# Patient Record
Sex: FEMALE | Race: WHITE | NOT HISPANIC OR LATINO | Employment: OTHER | URBAN - METROPOLITAN AREA
[De-identification: names, ages, dates, MRNs, and addresses within clinical notes are randomized per-mention and may not be internally consistent; named-entity substitution may affect disease eponyms.]

---

## 2019-02-14 ENCOUNTER — HOSPITAL ENCOUNTER (OUTPATIENT)
Dept: RADIOLOGY | Facility: HOSPITAL | Age: 39
Discharge: HOME | End: 2019-02-14
Attending: ORTHOPAEDIC SURGERY
Payer: MEDICARE

## 2019-02-14 ENCOUNTER — OFFICE VISIT (OUTPATIENT)
Dept: ORTHOPEDICS | Facility: CLINIC | Age: 39
End: 2019-02-14
Payer: MEDICARE

## 2019-02-14 VITALS
BODY MASS INDEX: 39.14 KG/M2 | SYSTOLIC BLOOD PRESSURE: 128 MMHG | HEART RATE: 77 BPM | DIASTOLIC BLOOD PRESSURE: 84 MMHG | WEIGHT: 228 LBS

## 2019-02-14 DIAGNOSIS — M79.672 LEFT FOOT PAIN: ICD-10-CM

## 2019-02-14 DIAGNOSIS — M79.672 LEFT FOOT PAIN: Primary | ICD-10-CM

## 2019-02-14 DIAGNOSIS — S92.345A NONDISPLACED FRACTURE OF FOURTH METATARSAL BONE, LEFT FOOT, INITIAL ENCOUNTER FOR CLOSED FRACTURE: ICD-10-CM

## 2019-02-14 DIAGNOSIS — E55.9 VITAMIN D DEFICIENCY: Primary | ICD-10-CM

## 2019-02-14 DIAGNOSIS — S92.335A NONDISPLACED FRACTURE OF THIRD METATARSAL BONE, LEFT FOOT, INITIAL ENCOUNTER FOR CLOSED FRACTURE: ICD-10-CM

## 2019-02-14 PROCEDURE — 28470 CLTX METATARSAL FX WO MNP EA: CPT | Mod: PBBFAC,PN | Performed by: ORTHOPAEDIC SURGERY

## 2019-02-14 PROCEDURE — 73630 X-RAY EXAM OF FOOT: CPT | Mod: TC,PO,LT

## 2019-02-14 PROCEDURE — 28470 PR CLOSED RX METATARSAL FX: ICD-10-PCS | Mod: S$PBB,LT,, | Performed by: ORTHOPAEDIC SURGERY

## 2019-02-14 PROCEDURE — 99999 PR PBB SHADOW E&M-NEW PATIENT-LVL III: CPT | Mod: PBBFAC,,, | Performed by: ORTHOPAEDIC SURGERY

## 2019-02-14 PROCEDURE — 99203 OFFICE O/P NEW LOW 30 MIN: CPT | Mod: S$PBB,57,, | Performed by: ORTHOPAEDIC SURGERY

## 2019-02-14 PROCEDURE — 73630 X-RAY EXAM OF FOOT: CPT | Mod: 26,LT,, | Performed by: RADIOLOGY

## 2019-02-14 PROCEDURE — 28470 CLTX METATARSAL FX WO MNP EA: CPT | Mod: S$PBB,LT,, | Performed by: ORTHOPAEDIC SURGERY

## 2019-02-14 PROCEDURE — 99999 PR PBB SHADOW E&M-NEW PATIENT-LVL III: ICD-10-PCS | Mod: PBBFAC,,, | Performed by: ORTHOPAEDIC SURGERY

## 2019-02-14 PROCEDURE — 73630 XR FOOT COMPLETE 3 VIEW LEFT: ICD-10-PCS | Mod: 26,LT,, | Performed by: RADIOLOGY

## 2019-02-14 PROCEDURE — 99203 OFFICE O/P NEW LOW 30 MIN: CPT | Mod: PBBFAC,25,PN | Performed by: ORTHOPAEDIC SURGERY

## 2019-02-14 PROCEDURE — 99203 PR OFFICE/OUTPT VISIT, NEW, LEVL III, 30-44 MIN: ICD-10-PCS | Mod: S$PBB,57,, | Performed by: ORTHOPAEDIC SURGERY

## 2019-02-18 RX ORDER — ERGOCALCIFEROL 1.25 MG/1
50000 CAPSULE ORAL
Qty: 4 CAPSULE | Refills: 0 | Status: SHIPPED | OUTPATIENT
Start: 2019-02-18 | End: 2019-09-27 | Stop reason: ALTCHOICE

## 2019-02-19 PROBLEM — S92.335A NONDISPLACED FRACTURE OF THIRD METATARSAL BONE, LEFT FOOT, INITIAL ENCOUNTER FOR CLOSED FRACTURE: Status: ACTIVE | Noted: 2019-02-19

## 2019-02-19 PROBLEM — S92.345A NONDISPLACED FRACTURE OF FOURTH METATARSAL BONE, LEFT FOOT, INITIAL ENCOUNTER FOR CLOSED FRACTURE: Status: ACTIVE | Noted: 2019-02-19

## 2019-02-19 NOTE — PROGRESS NOTES
"HPI: Sergio Clifford is a  38 y.o. female who complains of left foot pain. She had lisfranc fusion with me due to trauma about 4 years ago in this foot. She says she tripped and twisted her foot last night. She rates her pain as 5/10 today.     PAST MEDICAL/SURGICAL/FAMILY/SOCIAL/ HISTORY: REVIEWED    ALLERGIES/MEDICATIONS: REVIEWED       Review of Systems:     Constitution: Negative.   HEENT: Negative.   Eyes: Negative.   Cardiovascular: Negative.   Respiratory: Negative.   Endocrine: Negative.   Hematologic/Lymphatic: Negative.   Skin: Negative.   Musculoskeletal: Positive for left foot pain   Gastrointestinal: Negative.   Genitourinary: Negative.   Neurological: Negative.   Psychiatric/Behavioral: Negative.   Allergic/Immunologic: Negative.       PHYSICAL EXAM:  Vitals:    02/14/19 1115   BP: 128/84   Pulse: 77     Ht Readings from Last 1 Encounters:   11/06/14 5' 4" (1.626 m)     Wt Readings from Last 1 Encounters:   02/14/19 103.4 kg (228 lb)       GENERAL: Well developed, well nourished, no acute distress.  SKIN: Skin is intact. No atrophy, abrasions or lesions are noted.   Neurological: Normal mental status. Appropriate and conversant. Alert and oriented x 3.  GAIT: In wheelchair    Left  lower extremity:  2+ dorsalis pedis pulse.  Capillary refill < 3 seconds.  Normal range of motion tibiotalar and subtalar joints. Normal alignment of the forefoot and the hindfoot.  5/5 strength EHL, FHL, tibialis anterior, gastrocsoleus, tibialis posterior and peroneals. Sensation to light touch intact sural, saphenous, superficial peroneal and deep peroneal nerves. Mild swelling and ecchymosis of the forefoot, no deformity. No lymphadenopathy, no masses or tumors palpated.      XRAYS:   3 views of left foot obtained and reviewed today reveal minimally displaced 3rd and 4th metatarsal neck fractures with mild shortening.       ASSESSMENT:        Encounter Diagnoses   Name Primary?    Nondisplaced fracture of fourth " metatarsal bone, left foot, initial encounter for closed fracture     Nondisplaced fracture of third metatarsal bone, left foot, initial encounter for closed fracture           PLAN:  I spent 15 minutes in consulation with the patient today. More than half the time was spent counseling the patient on her condition. Non-operative treatment. Weight bearing as tolerated in jacque shoe. F/u 3 weeks with xray of the left foot.   I also ordered a vitamin D level and will supplement if needed.

## 2019-03-04 DIAGNOSIS — S92.335A NONDISPLACED FRACTURE OF THIRD METATARSAL BONE, LEFT FOOT, INITIAL ENCOUNTER FOR CLOSED FRACTURE: ICD-10-CM

## 2019-03-04 DIAGNOSIS — S92.345A NONDISPLACED FRACTURE OF FOURTH METATARSAL BONE, LEFT FOOT, INITIAL ENCOUNTER FOR CLOSED FRACTURE: Primary | ICD-10-CM

## 2019-03-07 ENCOUNTER — HOSPITAL ENCOUNTER (OUTPATIENT)
Dept: RADIOLOGY | Facility: HOSPITAL | Age: 39
Discharge: HOME | End: 2019-03-07
Attending: ORTHOPAEDIC SURGERY
Payer: MEDICARE

## 2019-03-07 ENCOUNTER — OFFICE VISIT (OUTPATIENT)
Dept: ORTHOPEDICS | Facility: CLINIC | Age: 39
End: 2019-03-07
Payer: MEDICARE

## 2019-03-07 VITALS
SYSTOLIC BLOOD PRESSURE: 124 MMHG | HEART RATE: 90 BPM | DIASTOLIC BLOOD PRESSURE: 85 MMHG | WEIGHT: 227.94 LBS | BODY MASS INDEX: 38.91 KG/M2 | HEIGHT: 64 IN

## 2019-03-07 DIAGNOSIS — S92.335A NONDISPLACED FRACTURE OF THIRD METATARSAL BONE, LEFT FOOT, INITIAL ENCOUNTER FOR CLOSED FRACTURE: ICD-10-CM

## 2019-03-07 DIAGNOSIS — S92.345A NONDISPLACED FRACTURE OF FOURTH METATARSAL BONE, LEFT FOOT, INITIAL ENCOUNTER FOR CLOSED FRACTURE: Primary | ICD-10-CM

## 2019-03-07 DIAGNOSIS — S92.345A NONDISPLACED FRACTURE OF FOURTH METATARSAL BONE, LEFT FOOT, INITIAL ENCOUNTER FOR CLOSED FRACTURE: ICD-10-CM

## 2019-03-07 PROCEDURE — 73630 X-RAY EXAM OF FOOT: CPT | Mod: 26,LT,, | Performed by: RADIOLOGY

## 2019-03-07 PROCEDURE — 99999 PR PBB SHADOW E&M-EST. PATIENT-LVL III: ICD-10-PCS | Mod: PBBFAC,,, | Performed by: ORTHOPAEDIC SURGERY

## 2019-03-07 PROCEDURE — 73630 X-RAY EXAM OF FOOT: CPT | Mod: TC,PO,LT

## 2019-03-07 PROCEDURE — 99024 POSTOP FOLLOW-UP VISIT: CPT | Mod: POP,,, | Performed by: ORTHOPAEDIC SURGERY

## 2019-03-07 PROCEDURE — 99999 PR PBB SHADOW E&M-EST. PATIENT-LVL III: CPT | Mod: PBBFAC,,, | Performed by: ORTHOPAEDIC SURGERY

## 2019-03-07 PROCEDURE — 73630 XR FOOT COMPLETE 3 VIEW LEFT: ICD-10-PCS | Mod: 26,LT,, | Performed by: RADIOLOGY

## 2019-03-07 PROCEDURE — 99024 PR POST-OP FOLLOW-UP VISIT: ICD-10-PCS | Mod: POP,,, | Performed by: ORTHOPAEDIC SURGERY

## 2019-03-07 PROCEDURE — 99213 OFFICE O/P EST LOW 20 MIN: CPT | Mod: PBBFAC,25,PN | Performed by: ORTHOPAEDIC SURGERY

## 2019-03-11 NOTE — PROGRESS NOTES
Subjective:      Patient ID: Sergio Clifford is a 38 y.o. female.    Chief Complaint: Injury of the Left Foot    Doing well today. She rates her pain as 4/10 today. DOI: 19.   Social History     Occupational History    Not on file   Tobacco Use    Smoking status: Former Smoker     Packs/day: 0.25     Years: 4.00     Pack years: 1.00     Types: Cigarettes     Last attempt to quit: 2012     Years since quittin.8    Smokeless tobacco: Never Used   Substance and Sexual Activity    Alcohol use: Yes     Comment: socal    Drug use: No    Sexual activity: Not on file            Objective:    Ortho Exam   Neurovascularly intact, mild swelling,  tenderness to palpation at the fracture sites.      XRAYS: 3 views of left foot obtained and reviewed today reveal interval progression of healing of 4th and 5th metatarsal neck frxs.       Assessment:       1. Nondisplaced fracture of fourth metatarsal bone, left foot, initial encounter for closed fracture    2. Nondisplaced fracture of third metatarsal bone, left foot, initial encounter for closed fracture          Plan:       Weight bearing as tolerated in jacque shoe. F/u 3 weeks with xray of the left foot.

## 2019-03-27 DIAGNOSIS — S92.345A NONDISPLACED FRACTURE OF FOURTH METATARSAL BONE, LEFT FOOT, INITIAL ENCOUNTER FOR CLOSED FRACTURE: Primary | ICD-10-CM

## 2019-03-27 DIAGNOSIS — S92.335A NONDISPLACED FRACTURE OF THIRD METATARSAL BONE, LEFT FOOT, INITIAL ENCOUNTER FOR CLOSED FRACTURE: ICD-10-CM

## 2019-03-28 ENCOUNTER — HOSPITAL ENCOUNTER (OUTPATIENT)
Dept: RADIOLOGY | Facility: HOSPITAL | Age: 39
Discharge: HOME | End: 2019-03-28
Attending: ORTHOPAEDIC SURGERY
Payer: MEDICARE

## 2019-03-28 ENCOUNTER — OFFICE VISIT (OUTPATIENT)
Dept: ORTHOPEDICS | Facility: CLINIC | Age: 39
End: 2019-03-28
Payer: MEDICARE

## 2019-03-28 VITALS
SYSTOLIC BLOOD PRESSURE: 127 MMHG | BODY MASS INDEX: 38.76 KG/M2 | HEIGHT: 64 IN | HEART RATE: 81 BPM | WEIGHT: 227 LBS | DIASTOLIC BLOOD PRESSURE: 89 MMHG

## 2019-03-28 DIAGNOSIS — S92.335A NONDISPLACED FRACTURE OF THIRD METATARSAL BONE, LEFT FOOT, INITIAL ENCOUNTER FOR CLOSED FRACTURE: ICD-10-CM

## 2019-03-28 DIAGNOSIS — S92.345A NONDISPLACED FRACTURE OF FOURTH METATARSAL BONE, LEFT FOOT, INITIAL ENCOUNTER FOR CLOSED FRACTURE: Primary | ICD-10-CM

## 2019-03-28 DIAGNOSIS — S92.345A NONDISPLACED FRACTURE OF FOURTH METATARSAL BONE, LEFT FOOT, INITIAL ENCOUNTER FOR CLOSED FRACTURE: ICD-10-CM

## 2019-03-28 PROCEDURE — 99213 OFFICE O/P EST LOW 20 MIN: CPT | Mod: PBBFAC,25,PN | Performed by: ORTHOPAEDIC SURGERY

## 2019-03-28 PROCEDURE — 73630 XR FOOT COMPLETE 3 VIEW LEFT: ICD-10-PCS | Mod: 26,LT,, | Performed by: RADIOLOGY

## 2019-03-28 PROCEDURE — 99999 PR PBB SHADOW E&M-EST. PATIENT-LVL III: CPT | Mod: PBBFAC,,, | Performed by: ORTHOPAEDIC SURGERY

## 2019-03-28 PROCEDURE — 73630 X-RAY EXAM OF FOOT: CPT | Mod: 26,LT,, | Performed by: RADIOLOGY

## 2019-03-28 PROCEDURE — 73630 X-RAY EXAM OF FOOT: CPT | Mod: TC,PO,LT

## 2019-03-28 PROCEDURE — 99024 PR POST-OP FOLLOW-UP VISIT: ICD-10-PCS | Mod: S$PBB,,, | Performed by: ORTHOPAEDIC SURGERY

## 2019-03-28 PROCEDURE — 99024 POSTOP FOLLOW-UP VISIT: CPT | Mod: S$PBB,,, | Performed by: ORTHOPAEDIC SURGERY

## 2019-03-28 PROCEDURE — 99999 PR PBB SHADOW E&M-EST. PATIENT-LVL III: ICD-10-PCS | Mod: PBBFAC,,, | Performed by: ORTHOPAEDIC SURGERY

## 2019-03-28 NOTE — PROGRESS NOTES
Subjective:      Patient ID: Sergio Clifford is a 38 y.o. female.    Chief Complaint: Injury of the Left Foot    Doing well today. She rates her pain as 0/10 today. DOI: 19.   Social History     Occupational History    Not on file   Tobacco Use    Smoking status: Former Smoker     Packs/day: 0.25     Years: 4.00     Pack years: 1.00     Types: Cigarettes     Last attempt to quit: 2012     Years since quittin.8    Smokeless tobacco: Never Used   Substance and Sexual Activity    Alcohol use: Yes     Comment: socal    Drug use: No    Sexual activity: Not on file            Objective:    Ortho Exam   Neurovascularly intact, mild swelling, minimal tenderness to palpation at the fracture sites.      XRAYS: 3 views of left foot obtained and reviewed today reveal interval progression of healing of 4th and 5th metatarsal neck frxs.       Assessment:       1. Nondisplaced fracture of fourth metatarsal bone, left foot, initial encounter for closed fracture    2. Nondisplaced fracture of third metatarsal bone, left foot, initial encounter for closed fracture          Plan:       Weight bearing as tolerated in regular shoe. Gradual return to exercise. F/u prn.

## 2019-09-20 ENCOUNTER — OFFICE VISIT (OUTPATIENT)
Dept: HEMATOLOGY/ONCOLOGY | Facility: CLINIC | Age: 39
End: 2019-09-20
Payer: MEDICARE

## 2019-09-20 VITALS
HEIGHT: 63 IN | TEMPERATURE: 99 F | DIASTOLIC BLOOD PRESSURE: 89 MMHG | RESPIRATION RATE: 19 BRPM | SYSTOLIC BLOOD PRESSURE: 125 MMHG | BODY MASS INDEX: 44.83 KG/M2 | HEART RATE: 87 BPM | WEIGHT: 253 LBS

## 2019-09-20 DIAGNOSIS — C50.512 MALIGNANT NEOPLASM OF LOWER-OUTER QUADRANT OF LEFT BREAST OF FEMALE, ESTROGEN RECEPTOR NEGATIVE: Primary | ICD-10-CM

## 2019-09-20 DIAGNOSIS — Z86.79 HISTORY OF MITRAL VALVE PROLAPSE: ICD-10-CM

## 2019-09-20 DIAGNOSIS — Z17.1 MALIGNANT NEOPLASM OF LOWER-OUTER QUADRANT OF LEFT BREAST OF FEMALE, ESTROGEN RECEPTOR NEGATIVE: Primary | ICD-10-CM

## 2019-09-20 PROCEDURE — 99204 PR OFFICE/OUTPT VISIT, NEW, LEVL IV, 45-59 MIN: ICD-10-PCS | Mod: S$GLB,,, | Performed by: INTERNAL MEDICINE

## 2019-09-20 PROCEDURE — 99204 OFFICE O/P NEW MOD 45 MIN: CPT | Mod: S$GLB,,, | Performed by: INTERNAL MEDICINE

## 2019-09-21 NOTE — PROGRESS NOTES
[facility] History & Physical    Subjective:      Patient ID:   Sergio Clifford  38 y.o. female  1980  ELROY Roche MD Vesanth Bethala, MD Cynthia Jean Pierre, MD      Chief Complaint:   L breast cancer.    HPI:  38 y.o. female palpated a L breast lump at 4 o'clock.  The mass is painful.  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  Mamm 2.8 cm mass.    Hx GERD, anxiety, MVP.     L breast lump bx at [facility], benign.   craniotomy for AVM removal,  shunt placed.  W.  Last MRI, ,  She is claustrophobic.   L foot fx and repair.    M0  Menses onset 12, 1st child at 13.    Light smoker, none since .  ETOH socially.  Allergy PCN, rash.  Codeine, itch. Demeral, hallucinations.  Job in Sales.    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  Brother lymphangioma or mesenteric cyst.  Sister multiple sclerosis.  Sister HTN, obese, LBP.    ROS:   GEN: normal without any fever, night sweats or weight loss  HEENT: See HPI  CV: normal with no CP, SOB, PND, GROVER or orthopnea  PULM: normal with no SOB, cough, hemoptysis, sputum or pleuritic pain  GI: normal with no abdominal pain, nausea, vomiting, constipation, diarrhea, melanotic stools, BRBPR, or hematemesis  : normal with no hematuria, dysuria  BREAST: See HPI  SKIN: normal with no rash, erythema, bruising, or swelling     Past Medical History:   Diagnosis Date    AVM (arteriovenous malformation)     CVA (cerebral infarction)     Edema     Fractures     MVP (mitral valve prolapse)     Reflux     Wears glasses      Past Surgical History:   Procedure Laterality Date    BRAIN AVM REPAIR      BREAST BIOPSY  left        CRANIOTOMY  2006    CVA tumor removal  2006    FOOT FRACTURE SURGERY      FUSION, FOOT Left 10/17/2012    Performed by Adryan Colorado MD at [facility] OR    VENTRICULOPERITONEAL SHUNT      MAGNETIC - NO MRI       Review of patient's allergies indicates:  "  Allergen Reactions    Codeine Itching    Demerol [meperidine] Other (See Comments)     halluciantions    Pcn [penicillins] Hives           Current Outpatient Medications:     betaxolol (KERLONE) 10 mg Tab, Take 5 mg by mouth once daily., Disp: , Rfl:     bumetanide (BUMEX) 2 MG tablet, Take 2 mg by mouth once daily. , Disp: , Rfl:     ENDOCET  mg per tablet, , Disp: , Rfl:     ergocalciferol (ERGOCALCIFEROL) 50,000 unit Cap, Take 1 capsule (50,000 Units total) by mouth every 7 days., Disp: 4 capsule, Rfl: 0    esomeprazole (NEXIUM) 20 MG capsule, Take 40 mg by mouth before breakfast. , Disp: , Rfl:     hydrochlorothiazide (HYDRODIURIL) 12.5 MG Tab, Take 12.5 mg by mouth once daily., Disp: , Rfl:     hydrocodone-acetaminophen (VICODIN) 5-500 mg per tablet, , Disp: , Rfl:     medroxyPROGESTERone (DEPO-PROVERA) 150 mg/mL injection, Inject 150 mg into the muscle every 3 (three) months. , Disp: , Rfl:     NASONEX 50 mcg/actuation nasal spray, , Disp: , Rfl:     sertraline (ZOLOFT) 25 MG tablet, Take 25 mg by mouth once daily. , Disp: , Rfl:     SIMVASTATIN ORAL, Take by mouth., Disp: , Rfl:     VOLTAREN 1 % Gel, continuous prn. , Disp: , Rfl:           Objective:   Vitals:  Blood pressure 125/89, pulse 87, temperature 99.1 °F (37.3 °C), temperature source Oral, resp. rate 19, height 5' 3" (1.6 m), weight 114.8 kg (253 lb).    Physical Examination:   GEN: no apparent distress, comfortable  HEAD: atraumatic and normocephalic  EYES: no pallor, no icterus  ENT:  no pharyngeal erythema, external ears WNL; no nasal discharge  NECK: no masses, thyroid normal, trachea midline, no LAD/LN's, supple  CV: RRR with no murmur; normal pulse; normal S1 and S2; no pedal edema  CHEST: Normal respiratory effort; CTAB; normal breath sounds; no wheeze or crackles  ABDOM: nontender and nondistended; soft;  no rebound/guarding, L/S NP  MUSC/Skeletal: ROM normal; no crepitus; joints normal; no deformities   EXTREM: no " clubbing, cyanosis, inflammation or swelling  SKIN: no rashes, lesions, ulcers, petechiae   : no cvat  NEURO: grossly intact; motor/sensory WNL;  no tremors,  Balance is off, ambulates with walker since her craniotomy.  PSYCH: normal mood, affect and behavior  LYMPH: normal cervical, supraclavicular, axillary and groin LN's  L Breast has hard nodule at 4 o'clock L breast, tender.  R Breast no palpable mass      Labs:   Lab Results   Component Value Date    WBC 8.4 10/16/2012    HGB 14.5 10/16/2012    HCT 42.3 10/16/2012    MCV 93.5 10/16/2012     (H) 10/16/2012        Assessment:   (1) 38 y.o. female with diagnosis of L lower outer Breast cancer, 2.8 cm, grade 3, triple negative.  Stage 2 dx clinically by primary size.    (2)Discussed the rationale of neoadjuvant chemotherapy x's 4-6 cycles, before surgery to decrease mass size, try to achieve CRISTIAN with chemo, before surgery.    She is leaning to bilateral mastectomy, and reconstruction, she does not want Rad Rx. And does not lean towards conserving the L breast.  She wants breast reduction.    Port placement Dr. Elkins.  2 D Echo for ejection fraction.    Options AC, TAC, CTX-Taxotere.    Check BRCA 1 & 2.    RTC 9/24/19.  Start week of 9/30/19, after port is placed.    Pt navigator, chemo school, co pay assistance stalin.          1. Malignant neoplasm of lower-outer quadrant of left breast of female, estrogen receptor negative    2. History of mitral valve prolapse               Malignant neoplasm of lower-outer quadrant of left breast of female, estrogen receptor negative  -     Ambulatory referral to General Surgery    History of mitral valve prolapse  -     Echo Color Flow Doppler? Yes; Bubble Contrast? No; Future; Expected date: 09/23/2019      Follow up in about 4 days (around 9/24/2019) for follow up on cancer status.    I have explained and the patient understands all of  the current recommendation(s). I have answered all of their questions to the best  of my ability and to their complete satisfaction.

## 2019-09-23 ENCOUNTER — CLINICAL SUPPORT (OUTPATIENT)
Dept: CARDIOLOGY | Facility: HOSPITAL | Age: 39
End: 2019-09-23
Attending: INTERNAL MEDICINE
Payer: MEDICARE

## 2019-09-23 VITALS — BODY MASS INDEX: 44.83 KG/M2 | WEIGHT: 253 LBS | HEIGHT: 63 IN

## 2019-09-23 LAB
AORTIC ROOT ANNULUS: 2.45 CM
AORTIC VALVE CUSP SEPERATION: 1.46 CM
AV INDEX (PROSTH): 0.97
AV MEAN GRADIENT: 3 MMHG
AV PEAK GRADIENT: 5 MMHG
AV VALVE AREA: 3.08 CM2
AV VELOCITY RATIO: 95.19
BSA FOR ECHO PROCEDURE: 2.26 M2
CV ECHO LV RWT: 0.59 CM
DOP CALC AO PEAK VEL: 1.13 M/S
DOP CALC AO VTI: 22.42 CM
DOP CALC LVOT AREA: 3.2 CM2
DOP CALC LVOT DIAMETER: 2.01 CM
DOP CALC LVOT PEAK VEL: 107.56 M/S
DOP CALC LVOT STROKE VOLUME: 69.01 CM3
DOP CALCLVOT PEAK VEL VTI: 21.76 CM
E WAVE DECELERATION TIME: 249.38 MSEC
E/A RATIO: 1.44
E/E' RATIO: 8.91 M/S
ECHO LV POSTERIOR WALL: 0.9 CM (ref 0.6–1.1)
FRACTIONAL SHORTENING: 30 % (ref 28–44)
INTERVENTRICULAR SEPTUM: 0.92 CM (ref 0.6–1.1)
LEFT ATRIUM SIZE: 3.73 CM
LEFT INTERNAL DIMENSION IN SYSTOLE: 2.12 CM (ref 2.1–4)
LEFT VENTRICLE MASS INDEX: 34 G/M2
LEFT VENTRICULAR INTERNAL DIMENSION IN DIASTOLE: 3.05 CM (ref 3.5–6)
LEFT VENTRICULAR MASS: 73.09 G
LV LATERAL E/E' RATIO: 7.54 M/S
LV SEPTAL E/E' RATIO: 10.89 M/S
MV PEAK A VEL: 0.68 M/S
MV PEAK E VEL: 0.98 M/S
PISA TR MAX VEL: 1.89 M/S
PV PEAK VELOCITY: 101.04 CM/S
RA PRESSURE: 3 MMHG
TDI LATERAL: 0.13 M/S
TDI SEPTAL: 0.09 M/S
TDI: 0.11 M/S
TR MAX PG: 14 MMHG
TV REST PULMONARY ARTERY PRESSURE: 17 MMHG

## 2019-09-23 PROCEDURE — 93306 TTE W/DOPPLER COMPLETE: CPT

## 2019-09-24 ENCOUNTER — OFFICE VISIT (OUTPATIENT)
Dept: HEMATOLOGY/ONCOLOGY | Facility: CLINIC | Age: 39
End: 2019-09-24
Payer: MEDICARE

## 2019-09-24 VITALS
RESPIRATION RATE: 18 BRPM | TEMPERATURE: 99 F | SYSTOLIC BLOOD PRESSURE: 115 MMHG | WEIGHT: 253.69 LBS | DIASTOLIC BLOOD PRESSURE: 74 MMHG | HEART RATE: 75 BPM | BODY MASS INDEX: 44.95 KG/M2

## 2019-09-24 DIAGNOSIS — Z86.79 HISTORY OF MITRAL VALVE PROLAPSE: ICD-10-CM

## 2019-09-24 DIAGNOSIS — D70.1 CHEMOTHERAPY INDUCED NEUTROPENIA: ICD-10-CM

## 2019-09-24 DIAGNOSIS — S92.345A NONDISPLACED FRACTURE OF FOURTH METATARSAL BONE, LEFT FOOT, INITIAL ENCOUNTER FOR CLOSED FRACTURE: ICD-10-CM

## 2019-09-24 DIAGNOSIS — C50.512 MALIGNANT NEOPLASM OF LOWER-OUTER QUADRANT OF LEFT BREAST OF FEMALE, ESTROGEN RECEPTOR NEGATIVE: Primary | ICD-10-CM

## 2019-09-24 DIAGNOSIS — S92.335A NONDISPLACED FRACTURE OF THIRD METATARSAL BONE, LEFT FOOT, INITIAL ENCOUNTER FOR CLOSED FRACTURE: ICD-10-CM

## 2019-09-24 DIAGNOSIS — Z17.1 MALIGNANT NEOPLASM OF LOWER-OUTER QUADRANT OF LEFT BREAST OF FEMALE, ESTROGEN RECEPTOR NEGATIVE: Primary | ICD-10-CM

## 2019-09-24 DIAGNOSIS — T45.1X5A CHEMOTHERAPY INDUCED NEUTROPENIA: ICD-10-CM

## 2019-09-24 PROCEDURE — 99214 OFFICE O/P EST MOD 30 MIN: CPT | Mod: S$GLB,,, | Performed by: INTERNAL MEDICINE

## 2019-09-24 PROCEDURE — 99214 PR OFFICE/OUTPT VISIT, EST, LEVL IV, 30-39 MIN: ICD-10-PCS | Mod: S$GLB,,, | Performed by: INTERNAL MEDICINE

## 2019-09-24 RX ORDER — LEVOCETIRIZINE DIHYDROCHLORIDE 5 MG/1
5 TABLET, FILM COATED ORAL DAILY
COMMUNITY
Start: 2019-07-31 | End: 2021-11-02

## 2019-09-24 RX ORDER — SERTRALINE HYDROCHLORIDE 50 MG/1
50 TABLET, FILM COATED ORAL EVERY MORNING
Refills: 3 | COMMUNITY
Start: 2019-07-19 | End: 2021-03-17

## 2019-09-24 RX ORDER — NITROFURANTOIN 25; 75 MG/1; MG/1
CAPSULE ORAL
COMMUNITY
Start: 2019-09-23 | End: 2019-12-12 | Stop reason: ALTCHOICE

## 2019-09-27 ENCOUNTER — HOSPITAL ENCOUNTER (OUTPATIENT)
Dept: PREADMISSION TESTING | Facility: HOSPITAL | Age: 39
Discharge: HOME | End: 2019-09-27
Attending: INTERNAL MEDICINE
Payer: MEDICARE

## 2019-09-27 VITALS
TEMPERATURE: 98 F | WEIGHT: 255.75 LBS | DIASTOLIC BLOOD PRESSURE: 80 MMHG | SYSTOLIC BLOOD PRESSURE: 130 MMHG | RESPIRATION RATE: 18 BRPM | BODY MASS INDEX: 43.66 KG/M2 | OXYGEN SATURATION: 100 % | HEART RATE: 72 BPM | HEIGHT: 64 IN

## 2019-09-27 DIAGNOSIS — Z01.818 PRE-OP TESTING: Primary | ICD-10-CM

## 2019-09-27 DIAGNOSIS — Z41.9 SURGERY, ELECTIVE: ICD-10-CM

## 2019-09-27 PROCEDURE — 93005 ELECTROCARDIOGRAM TRACING: CPT

## 2019-09-27 RX ORDER — LEVOFLOXACIN 5 MG/ML
500 INJECTION, SOLUTION INTRAVENOUS ONCE
Status: CANCELLED | OUTPATIENT
Start: 2019-09-30

## 2019-09-27 NOTE — DISCHARGE INSTRUCTIONS
To confirm, Your doctor has instructed you that surgery is scheduled for: Monday Sept 30    Pre-Op will call today between 4:00 and 6:00 PM with the final arrival time.      Please report to [facility] on [address] the morning of surgery.     Do not eat or drink anything after midnight the night before your surgery - THIS INCLUDES  WATER, GUM, MINTS AND CANDY.  YOU MAY BRUSH YOUR TEETH BUT DO NOT SWALLOW     TAKE ONLY THESE MEDICATIONS WITH A SMALL SIP OF WATER THE MORNING OF YOUR PROCEDURE: Betaxolol   Nexium       PLEASE NOTE:  The surgery schedule has many variables which may affect the time of your surgery case.  Family members should be available if your surgery time changes.  Plan to be here the day of your procedure between 4-6 hours.      DO NOT TAKE THESE MEDICATIONS 5-7 DAYS PRIOR to your procedure or per your surgeon's request: ASPIRIN, ALEVE, ADVIL, IBUPROFEN,  LEANDRA SELTZER, BC , FISH OIL , VITAMIN E, HERBALS  (May take Tylenol)      ONLY if you are prescribed any types of blood thinners such as:  Aspirin, Coumadin, Plavix, Pradaxa, Xarelto, Aggrenox, Effient, Eliquis, Savasya, Brilinta, or any other, ask your surgeon whether you should stop taking them and how long before surgery you should stop.  You may also need to verify with the prescribing physician if it is ok to stop your medication.                                                        IMPORTANT INSTRUCTIONS      · Do not smoke, vape or drink alcoholic beverages 24 hours prior to your procedure.  · Shower the night before AND the morning of your procedure with a Chlorhexidine wash such as Hibiclens or Dial antibacterial soap from the neck down.   ·  Do not get it on your face or in your eyes.  You may use your own shampoo and face wash. This helps your skin to be as bacteria free as possible.   ·  DO NOT remove hair from the surgery site.  Do not shave the incision site unless you are given specific instructions to do so.    · Sleep  in a bed with clean sheets.  Do not sleep with a pet in the bed.   · If you wear contact lenses, dentures, hearing aids or glasses, bring a container to put them in during surgery and give to a family member for safe keeping.    · Please leave all jewelry, piercing's and valuables at home.   · ONLY if a type and screen test is needed before surgery, please return:  · If your doctor has scheduled you for an overnight stay, bring a small overnight bag with any personal items you need.    · Make arrangements in advance for transportation home by a responsible adult.      · You must make arrangements for transportation, TAXI'S, UBER'S OR LYFTS ARE NOT ALLOWED.        If you have any questions about these instructions, call Pre-Op Admit  Nursing at [phone] or the Pre-Op Day Surgery Unit at [phone].

## 2019-09-29 DIAGNOSIS — D70.1 CHEMOTHERAPY INDUCED NEUTROPENIA: Primary | ICD-10-CM

## 2019-09-29 DIAGNOSIS — T45.1X5A CHEMOTHERAPY INDUCED NEUTROPENIA: Primary | ICD-10-CM

## 2019-09-29 DIAGNOSIS — C50.812 MALIGNANT NEOPLASM OF OVERLAPPING SITES OF LEFT BREAST IN FEMALE, ESTROGEN RECEPTOR NEGATIVE: ICD-10-CM

## 2019-09-29 DIAGNOSIS — Z17.1 MALIGNANT NEOPLASM OF OVERLAPPING SITES OF LEFT BREAST IN FEMALE, ESTROGEN RECEPTOR NEGATIVE: ICD-10-CM

## 2019-09-29 PROBLEM — C50.819 MALIGNANT NEOPLASM OF OVERLAPPING SITES OF BREAST IN FEMALE, ESTROGEN RECEPTOR NEGATIVE: Status: ACTIVE | Noted: 2019-09-29

## 2019-09-30 ENCOUNTER — HOSPITAL ENCOUNTER (OUTPATIENT)
Dept: RADIOLOGY | Facility: HOSPITAL | Age: 39
Discharge: HOME | End: 2019-09-30
Attending: SURGERY
Payer: MEDICARE

## 2019-09-30 ENCOUNTER — HOSPITAL ENCOUNTER (OUTPATIENT)
Facility: HOSPITAL | Age: 39
Discharge: HOME | End: 2019-09-30
Attending: SURGERY | Admitting: SURGERY
Payer: MEDICARE

## 2019-09-30 ENCOUNTER — ANESTHESIA EVENT (OUTPATIENT)
Dept: SURGERY | Facility: HOSPITAL | Age: 39
End: 2019-09-30
Payer: MEDICARE

## 2019-09-30 ENCOUNTER — ANESTHESIA (OUTPATIENT)
Dept: SURGERY | Facility: HOSPITAL | Age: 39
End: 2019-09-30
Payer: MEDICARE

## 2019-09-30 ENCOUNTER — TELEPHONE (OUTPATIENT)
Dept: HEMATOLOGY/ONCOLOGY | Facility: CLINIC | Age: 39
End: 2019-09-30

## 2019-09-30 VITALS
HEART RATE: 61 BPM | OXYGEN SATURATION: 95 % | TEMPERATURE: 98 F | RESPIRATION RATE: 15 BRPM | SYSTOLIC BLOOD PRESSURE: 130 MMHG | DIASTOLIC BLOOD PRESSURE: 80 MMHG

## 2019-09-30 DIAGNOSIS — Z17.1 MALIGNANT NEOPLASM OF OVERLAPPING SITES OF LEFT BREAST IN FEMALE, ESTROGEN RECEPTOR NEGATIVE: Primary | ICD-10-CM

## 2019-09-30 DIAGNOSIS — C50.812 MALIGNANT NEOPLASM OF OVERLAPPING SITES OF LEFT BREAST IN FEMALE, ESTROGEN RECEPTOR NEGATIVE: Primary | ICD-10-CM

## 2019-09-30 DIAGNOSIS — Z41.9 SURGERY, ELECTIVE: ICD-10-CM

## 2019-09-30 DIAGNOSIS — Z45.2 ENCOUNTER FOR INSERTION OF VENOUS ACCESS PORT: ICD-10-CM

## 2019-09-30 PROBLEM — T45.1X5A CHEMOTHERAPY INDUCED NEUTROPENIA: Status: ACTIVE | Noted: 2019-09-30

## 2019-09-30 PROBLEM — D70.1 CHEMOTHERAPY INDUCED NEUTROPENIA: Status: ACTIVE | Noted: 2019-09-30

## 2019-09-30 PROCEDURE — 71000015 HC POSTOP RECOV 1ST HR: Performed by: SURGERY

## 2019-09-30 PROCEDURE — C1788 PORT, INDWELLING, IMP: HCPCS | Performed by: SURGERY

## 2019-09-30 PROCEDURE — 25000003 PHARM REV CODE 250: Performed by: ANESTHESIOLOGY

## 2019-09-30 PROCEDURE — 36000704 HC OR TIME LEV I 1ST 15 MIN: Performed by: SURGERY

## 2019-09-30 PROCEDURE — 25000003 PHARM REV CODE 250: Performed by: NURSE ANESTHETIST, CERTIFIED REGISTERED

## 2019-09-30 PROCEDURE — 63600175 PHARM REV CODE 636 W HCPCS: Performed by: NURSE ANESTHETIST, CERTIFIED REGISTERED

## 2019-09-30 PROCEDURE — 37000009 HC ANESTHESIA EA ADD 15 MINS: Performed by: SURGERY

## 2019-09-30 PROCEDURE — 37000008 HC ANESTHESIA 1ST 15 MINUTES: Performed by: SURGERY

## 2019-09-30 PROCEDURE — 25000003 PHARM REV CODE 250: Performed by: SURGERY

## 2019-09-30 PROCEDURE — 27201107 HC STYLET, STANDARD: Performed by: ANESTHESIOLOGY

## 2019-09-30 PROCEDURE — 63600175 PHARM REV CODE 636 W HCPCS: Performed by: SURGERY

## 2019-09-30 PROCEDURE — 27000673 HC TUBING BLOOD Y: Performed by: ANESTHESIOLOGY

## 2019-09-30 PROCEDURE — 63600175 PHARM REV CODE 636 W HCPCS: Performed by: ANESTHESIOLOGY

## 2019-09-30 PROCEDURE — 71000033 HC RECOVERY, INTIAL HOUR: Performed by: SURGERY

## 2019-09-30 PROCEDURE — 27201423 OPTIME MED/SURG SUP & DEVICES STERILE SUPPLY: Performed by: SURGERY

## 2019-09-30 PROCEDURE — 36000705 HC OR TIME LEV I EA ADD 15 MIN: Performed by: SURGERY

## 2019-09-30 PROCEDURE — 77001 FLUOROGUIDE FOR VEIN DEVICE: CPT | Mod: TC

## 2019-09-30 DEVICE — PORT POWER MRI 8FR 1808000: Type: IMPLANTABLE DEVICE | Site: CHEST | Status: FUNCTIONAL

## 2019-09-30 RX ORDER — ROCURONIUM BROMIDE 10 MG/ML
INJECTION, SOLUTION INTRAVENOUS
Status: DISCONTINUED | OUTPATIENT
Start: 2019-09-30 | End: 2019-09-30

## 2019-09-30 RX ORDER — DEXAMETHASONE SODIUM PHOSPHATE 4 MG/ML
INJECTION, SOLUTION INTRA-ARTICULAR; INTRALESIONAL; INTRAMUSCULAR; INTRAVENOUS; SOFT TISSUE
Status: DISCONTINUED | OUTPATIENT
Start: 2019-09-30 | End: 2019-09-30

## 2019-09-30 RX ORDER — NEOSTIGMINE METHYLSULFATE 1 MG/ML
INJECTION, SOLUTION INTRAVENOUS
Status: DISCONTINUED | OUTPATIENT
Start: 2019-09-30 | End: 2019-09-30

## 2019-09-30 RX ORDER — HYDROMORPHONE HYDROCHLORIDE 1 MG/ML
0.2 INJECTION, SOLUTION INTRAMUSCULAR; INTRAVENOUS; SUBCUTANEOUS
Status: DISCONTINUED | OUTPATIENT
Start: 2019-09-30 | End: 2019-09-30 | Stop reason: HOSPADM

## 2019-09-30 RX ORDER — SODIUM CHLORIDE 0.9 % (FLUSH) 0.9 %
10 SYRINGE (ML) INJECTION
Status: CANCELLED | OUTPATIENT
Start: 2019-10-07

## 2019-09-30 RX ORDER — ONDANSETRON 2 MG/ML
4 INJECTION INTRAMUSCULAR; INTRAVENOUS EVERY 12 HOURS PRN
Status: DISCONTINUED | OUTPATIENT
Start: 2019-09-30 | End: 2019-09-30 | Stop reason: HOSPADM

## 2019-09-30 RX ORDER — SODIUM CHLORIDE, SODIUM LACTATE, POTASSIUM CHLORIDE, CALCIUM CHLORIDE 600; 310; 30; 20 MG/100ML; MG/100ML; MG/100ML; MG/100ML
INJECTION, SOLUTION INTRAVENOUS CONTINUOUS PRN
Status: DISCONTINUED | OUTPATIENT
Start: 2019-09-30 | End: 2019-09-30

## 2019-09-30 RX ORDER — HEPARIN SODIUM 1000 [USP'U]/ML
INJECTION, SOLUTION INTRAVENOUS; SUBCUTANEOUS
Status: DISCONTINUED | OUTPATIENT
Start: 2019-09-30 | End: 2019-09-30 | Stop reason: HOSPADM

## 2019-09-30 RX ORDER — ACETAMINOPHEN 325 MG/1
650 TABLET ORAL ONCE
Status: CANCELLED
Start: 2019-10-07

## 2019-09-30 RX ORDER — HYDROCODONE BITARTRATE AND ACETAMINOPHEN 5; 325 MG/1; MG/1
1 TABLET ORAL 3 TIMES DAILY PRN
Qty: 15 TABLET | Refills: 0
Start: 2019-09-30 | End: 2019-12-12 | Stop reason: ALTCHOICE

## 2019-09-30 RX ORDER — GLYCOPYRROLATE 0.2 MG/ML
INJECTION INTRAMUSCULAR; INTRAVENOUS
Status: DISCONTINUED | OUTPATIENT
Start: 2019-09-30 | End: 2019-09-30

## 2019-09-30 RX ORDER — LIDOCAINE HYDROCHLORIDE 20 MG/ML
INJECTION, SOLUTION EPIDURAL; INFILTRATION; INTRACAUDAL; PERINEURAL
Status: DISCONTINUED | OUTPATIENT
Start: 2019-09-30 | End: 2019-09-30

## 2019-09-30 RX ORDER — DOXORUBICIN HYDROCHLORIDE 2 MG/ML
50 INJECTION, SOLUTION INTRAVENOUS
Status: CANCELLED | OUTPATIENT
Start: 2019-10-07

## 2019-09-30 RX ORDER — DIPHENHYDRAMINE HYDROCHLORIDE 50 MG/ML
12.5 INJECTION INTRAMUSCULAR; INTRAVENOUS
Status: DISCONTINUED | OUTPATIENT
Start: 2019-09-30 | End: 2019-09-30 | Stop reason: HOSPADM

## 2019-09-30 RX ORDER — ONDANSETRON 2 MG/ML
4 INJECTION INTRAMUSCULAR; INTRAVENOUS DAILY PRN
Status: DISCONTINUED | OUTPATIENT
Start: 2019-09-30 | End: 2019-09-30 | Stop reason: HOSPADM

## 2019-09-30 RX ORDER — SODIUM CHLORIDE 0.9 % (FLUSH) 0.9 %
10 SYRINGE (ML) INJECTION
Status: DISCONTINUED | OUTPATIENT
Start: 2019-09-30 | End: 2019-09-30 | Stop reason: HOSPADM

## 2019-09-30 RX ORDER — PROPOFOL 10 MG/ML
VIAL (ML) INTRAVENOUS
Status: DISCONTINUED | OUTPATIENT
Start: 2019-09-30 | End: 2019-09-30

## 2019-09-30 RX ORDER — HYDROCODONE BITARTRATE AND ACETAMINOPHEN 5; 325 MG/1; MG/1
1 TABLET ORAL EVERY 6 HOURS PRN
Status: DISCONTINUED | OUTPATIENT
Start: 2019-09-30 | End: 2019-09-30 | Stop reason: HOSPADM

## 2019-09-30 RX ORDER — FAMOTIDINE 10 MG/ML
20 INJECTION INTRAVENOUS
Status: CANCELLED
Start: 2019-10-07

## 2019-09-30 RX ORDER — SUCCINYLCHOLINE CHLORIDE 20 MG/ML
INJECTION INTRAMUSCULAR; INTRAVENOUS
Status: DISCONTINUED | OUTPATIENT
Start: 2019-09-30 | End: 2019-09-30

## 2019-09-30 RX ORDER — LEVOFLOXACIN 5 MG/ML
500 INJECTION, SOLUTION INTRAVENOUS ONCE
Status: DISCONTINUED | OUTPATIENT
Start: 2019-09-30 | End: 2019-09-30 | Stop reason: HOSPADM

## 2019-09-30 RX ORDER — BUPIVACAINE HYDROCHLORIDE AND EPINEPHRINE 5; 5 MG/ML; UG/ML
INJECTION, SOLUTION EPIDURAL; INTRACAUDAL; PERINEURAL
Status: DISCONTINUED | OUTPATIENT
Start: 2019-09-30 | End: 2019-09-30 | Stop reason: HOSPADM

## 2019-09-30 RX ORDER — MIDAZOLAM HYDROCHLORIDE 1 MG/ML
INJECTION INTRAMUSCULAR; INTRAVENOUS
Status: DISCONTINUED | OUTPATIENT
Start: 2019-09-30 | End: 2019-09-30

## 2019-09-30 RX ORDER — HEPARIN 100 UNIT/ML
500 SYRINGE INTRAVENOUS
Status: CANCELLED | OUTPATIENT
Start: 2019-10-07

## 2019-09-30 RX ORDER — ACETAMINOPHEN 325 MG/1
650 TABLET ORAL EVERY 4 HOURS PRN
Status: DISCONTINUED | OUTPATIENT
Start: 2019-09-30 | End: 2019-09-30 | Stop reason: HOSPADM

## 2019-09-30 RX ORDER — OXYCODONE HYDROCHLORIDE 5 MG/1
5 TABLET ORAL
Status: DISCONTINUED | OUTPATIENT
Start: 2019-09-30 | End: 2019-09-30 | Stop reason: HOSPADM

## 2019-09-30 RX ADMIN — ONDANSETRON 4 MG: 2 INJECTION INTRAMUSCULAR; INTRAVENOUS at 09:09

## 2019-09-30 RX ADMIN — OXYCODONE HYDROCHLORIDE 5 MG: 5 TABLET ORAL at 10:09

## 2019-09-30 RX ADMIN — ROCURONIUM BROMIDE 5 MG: 10 INJECTION, SOLUTION INTRAVENOUS at 09:09

## 2019-09-30 RX ADMIN — DEXAMETHASONE SODIUM PHOSPHATE 4 MG: 4 INJECTION, SOLUTION INTRAMUSCULAR; INTRAVENOUS at 09:09

## 2019-09-30 RX ADMIN — GLYCOPYRROLATE 0.5 MG: 0.2 INJECTION INTRAMUSCULAR; INTRAVENOUS at 10:09

## 2019-09-30 RX ADMIN — MIDAZOLAM 2 MG: 1 INJECTION INTRAMUSCULAR; INTRAVENOUS at 09:09

## 2019-09-30 RX ADMIN — PROPOFOL 150 MG: 10 INJECTION, EMULSION INTRAVENOUS at 09:09

## 2019-09-30 RX ADMIN — LIDOCAINE HYDROCHLORIDE 40 MG: 20 INJECTION, SOLUTION EPIDURAL; INFILTRATION; INTRACAUDAL; PERINEURAL at 09:09

## 2019-09-30 RX ADMIN — SODIUM CHLORIDE, SODIUM LACTATE, POTASSIUM CHLORIDE, AND CALCIUM CHLORIDE: .6; .31; .03; .02 INJECTION, SOLUTION INTRAVENOUS at 09:09

## 2019-09-30 RX ADMIN — NEOSTIGMINE METHYLSULFATE 4 MG: 1 INJECTION INTRAVENOUS at 10:09

## 2019-09-30 RX ADMIN — ROCURONIUM BROMIDE 25 MG: 10 INJECTION, SOLUTION INTRAVENOUS at 09:09

## 2019-09-30 RX ADMIN — SUCCINYLCHOLINE CHLORIDE 140 MG: 20 INJECTION, SOLUTION INTRAMUSCULAR; INTRAVENOUS at 09:09

## 2019-09-30 NOTE — OP NOTE
Preop diagnosis:  Left breast cancer    Postop diagnosis:  Same  Procedure:  Port placement  Patient is placed supine on the operating table under satisfactory general anesthesia.  Both arms were tucked.  Transverse shoulder roll was placed.  The neck and upper chest were prepped and draped Betadine and draped in sterile fashion.  I made a transverse right parasternal incision developed a pocket for the port on top the muscle.  The port was sewn in with interrupted permanent sutures.  Stab incision was made below the lateral 3rd of the right clavicle.  The catheter was tunneled to this incision.  Needle was inserted into the subclavian vein and guidewire was threaded into the superior vena cava.  This was confirmed with the C-arm.  Catheter was cut to the appropriate length.  Dilator and a sheath placed over the guidewire.  Catheter was the dilator and guidewire removed the catheter was fed down through the sheath leaving the tip of the catheter in the proximal superior vena cava.  Port was aspirated and functioning well and flushed heparinized saline.  Wounds were closed in layers with absorbable suture. Patient tolerated procedure well.

## 2019-09-30 NOTE — PROGRESS NOTES
[facility] History & Physical    Subjective:      Patient ID:   Sergio Clifford  38 y.o. female  1980  Carmelita Guevara,MD Jenny Carter MD Cynthia Jean Pierre, MD      Chief Complaint:   L breast cancer.    HPI:  38 y.o. female palpated a L breast lump at 4 o'clock.  The mass is painful.  Bx [id number]  [facility]  Invasive ductal carcinoma, grade 3,  ERP-, PRP-, H2N-.  Mamm 2.8 cm mass.    Hx GERD, anxiety, MVP.     L breast lump bx at [facility], benign.   craniotomy for AVM removal,  shunt placed.  W.  Last MRI, ,  She is claustrophobic.   L foot fx and repair.    M0  Menses onset 12, 1st child at 13.    Light smoker, none since .  ETOH socially.  Allergy PCN, rash.  Codeine, itch. Demeral, hallucinations.  Job in Sales.    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  Brother lymphangioma or mesenteric cyst.  Sister multiple sclerosis.  Sister HTN, obese, LBP.    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    Breast cancer 2019    CVA (cerebral infarction)     Edema     Fractures     left foot    MVP (mitral valve prolapse)     Reflux     Wears glasses      Past Surgical History:   Procedure Laterality Date    BRAIN AVM REPAIR      BREAST BIOPSY  left    2002    CRANIOTOMY  2006    CVA tumor removal  2006    FOOT FRACTURE SURGERY      VENTRICULOPERITONEAL SHUNT  2006    MAGNETIC - NO MRI       Review of patient's  allergies indicates:   Allergen Reactions    Codeine Itching    Demerol [meperidine] Other (See Comments)     hallucinations    Pcn [penicillins] Hives           Current Outpatient Medications:     betaxolol (KERLONE) 10 mg Tab, Take 5 mg by mouth once daily., Disp: , Rfl:     esomeprazole (NEXIUM) 20 MG capsule, Take 40 mg by mouth before breakfast. , Disp: , Rfl:     hydrocodone-acetaminophen (VICODIN) 5-500 mg per tablet, , Disp: , Rfl:     levocetirizine (XYZAL) 5 MG tablet, Take 5 mg by mouth., Disp: , Rfl:     nitrofurantoin, macrocrystal-monohydrate, (MACROBID) 100 MG capsule, , Disp: , Rfl:     sertraline (ZOLOFT) 50 MG tablet, Take 50 mg by mouth every morning., Disp: , Rfl: 3    HYDROcodone-acetaminophen (NORCO) 5-325 mg per tablet, Take 1 tablet by mouth 3 (three) times daily as needed for Pain., Disp: 15 tablet, Rfl: 0  No current facility-administered medications for this visit.     Facility-Administered Medications Ordered in Other Visits:     acetaminophen tablet 650 mg, 650 mg, Oral, Q4H PRN, Caitlyn Elkins MD    diphenhydrAMINE injection 12.5 mg, 12.5 mg, Intravenous, Q15 Min PRN, Toby Heath MD    HYDROcodone-acetaminophen 5-325 mg per tablet 1 tablet, 1 tablet, Oral, Q6H PRN, Caitlyn Elkins MD    HYDROmorphone injection 0.2 mg, 0.2 mg, Intravenous, Q3 Min PRN, Toby Heath MD    levoFLOXacin 500 mg/100 mL IVPB 500 mg, 500 mg, Intravenous, Once, Caitlyn Elkins MD    ondansetron injection 4 mg, 4 mg, Intravenous, Daily PRN, Toby Heath MD, 4 mg at 09/30/19 0919    ondansetron injection 4 mg, 4 mg, Intravenous, Q12H PRN, Caitlyn Elkins MD    oxyCODONE immediate release tablet 5 mg, 5 mg, Oral, Q3H PRN, Toby Heath MD, 5 mg at 09/30/19 1036    promethazine (PHENERGAN) 6.25 mg in dextrose 5 % 50 mL IVPB, 6.25 mg, Intravenous, Q10 Min PRN, Toby Heath MD    promethazine (PHENERGAN) 6.25 mg in dextrose 5 % 50 mL IVPB, 6.25 mg, Intravenous, Q6H PRN, Caitlyn  CASEY Elkins MD    sodium chloride 0.9% flush 10 mL, 10 mL, Intravenous, PRN, Toby Heath MD          Objective:   Vitals:  Blood pressure 115/74, pulse 75, temperature 98.6 °F (37 °C), resp. rate 18, weight 115.1 kg (253 lb 11.2 oz), last menstrual period 09/17/2019.    Physical Examination:   GEN: no apparent distress, comfortable  HEAD: atraumatic and normocephalic  EYES: no pallor, no icterus  ENT:  no pharyngeal erythema, external ears WNL; no nasal discharge  NECK: no masses, thyroid normal, trachea midline, no LAD/LN's, supple  CV: RRR with no murmur; normal pulse; normal S1 and S2; no pedal edema  CHEST: Normal respiratory effort; CTAB; normal breath sounds; no wheeze or crackles  ABDOM: nontender and nondistended; soft;  no rebound/guarding, L/S NP  MUSC/Skeletal: ROM normal; no crepitus; joints normal; no deformities   EXTREM: no clubbing, cyanosis, inflammation or swelling  SKIN: no rashes, lesions, ulcers, petechiae   : no cvat  NEURO: grossly intact; motor/sensory WNL;  no tremors,  Balance is off, ambulates with walker since her craniotomy.  PSYCH: normal mood, affect and behavior  LYMPH: normal cervical, supraclavicular, axillary and groin LN's  L Breast has hard nodule at 4 o'clock L breast, tender.  R Breast no palpable mass      Labs:   Lab Results   Component Value Date    WBC 7.35 09/27/2019    HGB 13.0 09/27/2019    HCT 40.8 09/27/2019    MCV 92 09/27/2019     09/27/2019        Assessment:   (1) 38 y.o. female with diagnosis of L lower outer Breast cancer, 2.8 cm, grade 3, triple negative.  Stage 2 dx clinically by primary size.    (2)Discussed the rationale of neoadjuvant chemotherapy x's 4-6 cycles, before surgery to decrease mass size, try to achieve CRISTIAN with chemo, before surgery.    She is leaning to bilateral mastectomy, and reconstruction, she does not want Rad Rx. And does not lean towards conserving the L breast.  She wants breast reduction.    Port placement Dr. Elkins.  2 D  Echo for ejection fraction.    Options AC, TAC, CTX-Taxotere.    Check BRCA 1 & 2.    RTC 19.  Start week of 19, after port is placed.    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          No diagnosis found.           There are no diagnoses linked to this encounter.  No follow-ups on file.    I have explained and the patient understands all of  the current recommendation(s). I have answered all of their questions to the best of my ability and to their complete satisfaction.       [facility] History & Physical    Subjective:      Patient ID:   Sergio Clifford  38 y.o. female  1980  Carmelita Guevara,MD Jenny Carter MD Cynthia Jean Pierre, MD      Chief Complaint:   L breast cancer.    HPI:  38 y.o. female palpated a L breast lump at 4 o'clock.  The mass is painful.  [id number]  Invasive ductal carcinoma, grade 3,  ERP-, PRP-, H2N-.  Mamm 2.8 cm mass.    Hx GERD, anxiety, MVP.     L breast lump bx at [facility], benign.   craniotomy for AVM removal,  shunt placed.  W.  Last MRI, ,  She is claustrophobic.   L foot fx and repair.    M0  Menses onset 12, 1st child at 13.    Light smoker, none since .  ETOH socially.  Allergy PCN, rash.  Codeine, itch. Demeral, hallucinations.  Job in Sales.    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  Brother lymphangioma or mesenteric cyst.  Sister multiple sclerosis.  Sister HTN, obese, LBP.    ROS:   GEN: normal without any fever, night sweats or weight loss  HEENT: See HPI  CV: normal with no CP, SOB, PND, GROVER or orthopnea  PULM: normal with no SOB, cough, hemoptysis, sputum or pleuritic pain  GI: normal with no abdominal pain, nausea, vomiting, constipation, diarrhea, melanotic stools, BRBPR, or hematemesis  : normal with no hematuria, dysuria  BREAST: See HPI  SKIN: normal with no rash, erythema, bruising, or swelling     Past Medical History:   Diagnosis Date    Anxiety 2006    AVM  (arteriovenous malformation)     Balance disorder 2006    from AVM  uses walker    Breast cancer 08/2019    CVA (cerebral infarction)     Edema     Fractures     left foot    MVP (mitral valve prolapse)     Reflux     Wears glasses      Past Surgical History:   Procedure Laterality Date    BRAIN AVM REPAIR      BREAST BIOPSY  left    2002    CRANIOTOMY  2006    CVA tumor removal  2006    FOOT FRACTURE SURGERY      VENTRICULOPERITONEAL SHUNT  2006    MAGNETIC - NO MRI       Review of patient's allergies indicates:   Allergen Reactions    Codeine Itching    Demerol [meperidine] Other (See Comments)     hallucinations    Pcn [penicillins] Hives           Current Outpatient Medications:     betaxolol (KERLONE) 10 mg Tab, Take 5 mg by mouth once daily., Disp: , Rfl:     esomeprazole (NEXIUM) 20 MG capsule, Take 40 mg by mouth before breakfast. , Disp: , Rfl:     hydrocodone-acetaminophen (VICODIN) 5-500 mg per tablet, , Disp: , Rfl:     levocetirizine (XYZAL) 5 MG tablet, Take 5 mg by mouth., Disp: , Rfl:     nitrofurantoin, macrocrystal-monohydrate, (MACROBID) 100 MG capsule, , Disp: , Rfl:     sertraline (ZOLOFT) 50 MG tablet, Take 50 mg by mouth every morning., Disp: , Rfl: 3    HYDROcodone-acetaminophen (NORCO) 5-325 mg per tablet, Take 1 tablet by mouth 3 (three) times daily as needed for Pain., Disp: 15 tablet, Rfl: 0  No current facility-administered medications for this visit.     Facility-Administered Medications Ordered in Other Visits:     acetaminophen tablet 650 mg, 650 mg, Oral, Q4H PRN, Caitlyn Elkins MD    diphenhydrAMINE injection 12.5 mg, 12.5 mg, Intravenous, Q15 Min PRN, Toby Heath MD    HYDROcodone-acetaminophen 5-325 mg per tablet 1 tablet, 1 tablet, Oral, Q6H PRN, Caitlyn Elkins MD    HYDROmorphone injection 0.2 mg, 0.2 mg, Intravenous, Q3 Min PRN, Toby Heath MD    levoFLOXacin 500 mg/100 mL IVPB 500 mg, 500 mg, Intravenous, Once, Caitlyn Elkins MD     ondansetron injection 4 mg, 4 mg, Intravenous, Daily PRN, Toby Heath MD, 4 mg at 09/30/19 0919    ondansetron injection 4 mg, 4 mg, Intravenous, Q12H PRN, Caitlyn Elkins MD    oxyCODONE immediate release tablet 5 mg, 5 mg, Oral, Q3H PRN, Toby Heath MD, 5 mg at 09/30/19 1036    promethazine (PHENERGAN) 6.25 mg in dextrose 5 % 50 mL IVPB, 6.25 mg, Intravenous, Q10 Min PRN, Toby Heath MD    promethazine (PHENERGAN) 6.25 mg in dextrose 5 % 50 mL IVPB, 6.25 mg, Intravenous, Q6H PRN, Caitlyn Elkins MD    sodium chloride 0.9% flush 10 mL, 10 mL, Intravenous, PRN, Toby Heath MD          Objective:   Vitals:  Blood pressure 115/74, pulse 75, temperature 98.6 °F (37 °C), resp. rate 18, weight 115.1 kg (253 lb 11.2 oz), last menstrual period 09/17/2019.    Physical Examination:   GEN: no apparent distress, comfortable  HEAD: atraumatic and normocephalic  EYES: no pallor, no icterus  ENT:  no pharyngeal erythema, external ears WNL; no nasal discharge  NECK: no masses, thyroid normal, trachea midline, no LAD/LN's, supple  CV: RRR with no murmur; normal pulse; normal S1 and S2; no pedal edema  CHEST: Normal respiratory effort; CTAB; normal breath sounds; no wheeze or crackles  ABDOM: nontender and nondistended; soft;  no rebound/guarding, L/S NP  MUSC/Skeletal: ROM normal; no crepitus; joints normal; no deformities   EXTREM: no clubbing, cyanosis, inflammation or swelling  SKIN: no rashes, lesions, ulcers, petechiae   : no cvat  NEURO: grossly intact; motor/sensory WNL;  no tremors,  Balance is off, ambulates with walker since her craniotomy.  PSYCH: normal mood, affect and behavior  LYMPH: normal cervical, supraclavicular, axillary and groin LN's  L Breast has hard nodule at 4 o'clock L breast, tender.  R Breast no palpable mass      Labs:   Lab Results   Component Value Date    WBC 7.35 09/27/2019    HGB 13.0 09/27/2019    HCT 40.8 09/27/2019    MCV 92 09/27/2019     09/27/2019       ECHO Ej Fx 60%.    Assessment:   (1) 38 y.o. female with diagnosis of L lower outer Breast cancer, 2.8 cm, grade 3, triple negative.  Stage 2 dx clinically by primary size.    (2)Discussed the rationale of neoadjuvant chemotherapy x's 4-6 cycles, before surgery to decrease mass size, try to achieve CRISTIAN with chemo, before surgery.    She is leaning to bilateral mastectomy, and reconstruction, she does not want Rad Rx. And does not lean towards conserving the L breast.  She wants breast reduction.    Port placement Dr. Elkins.  2 D Echo for ejection fraction is 60%.    Discussed TAC q 3 weeks x's 6 cycles.  This is one of the third generation aggressive regimens, offers us a good chance of CR.    Significant pancytopenia, myelosuppression expected,  Follow CBC weekly, neulasta support.    N/V control with Zofran, Phenergan, Decadron.    Temporary hair loss expected with chemo, sometimes permanent hair loss with Taxotere.    Premeds discussed including Decadron, benadryl, pepcid, Zofran.    Watch for peripheral neuropathy 2nd Taxotere.    Base line Ej Fx 60%.  Potential for cardicac toxicity with adriamycin usage <15% expected.  Follow with intermittent ECHO during Rx.    All of above discussed with her.    RTC 10/7/19.    Check BRCA 1 & 2.    RTC 9/24/19.  Start week of 9/30/19, after port is placed.    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          No diagnosis found.           There are no diagnoses linked to this encounter.  No follow-ups on file.    I have explained and the patient understands all of  the current recommendation(s). I have answered all of their questions to the best of my ability and to their complete satisfaction.

## 2019-09-30 NOTE — ANESTHESIA POSTPROCEDURE EVALUATION
Anesthesia Post Evaluation    Patient: Sergio Clifford    Procedure(s) Performed: Procedure(s) (LRB):  INSERTION, CATHETER (Right)    Final Anesthesia Type: general  Patient location during evaluation: PACU  Patient participation: Yes- Able to Participate  Level of consciousness: awake and alert and oriented  Post-procedure vital signs: reviewed and stable  Pain management: adequate  Airway patency: patent  PONV status at discharge: No PONV  Anesthetic complications: no      Cardiovascular status: blood pressure returned to baseline and hemodynamically stable  Respiratory status: unassisted, spontaneous ventilation and room air  Hydration status: euvolemic  Follow-up not needed.      CXR OK per radiologist    Vitals Value Taken Time   /56 9/30/2019 10:48 AM   Temp 36.4 °C (97.5 °F) 9/30/2019 10:45 AM   Pulse 61 9/30/2019 10:59 AM   Resp 16 9/30/2019 10:59 AM   SpO2 93 % 9/30/2019 10:59 AM   Vitals shown include unvalidated device data.      No case tracking events are documented in the log.      Pain/Corazon Score: Pain Rating Prior to Med Admin: 3 (9/30/2019 10:36 AM)  Corazon Score: 10 (9/30/2019 10:45 AM)

## 2019-09-30 NOTE — ANESTHESIA PREPROCEDURE EVALUATION
09/30/2019  Sergio Clifford is a 38 y.o., female.    Patient Active Problem List   Diagnosis    Nondisplaced fracture of fourth metatarsal bone, left foot, initial encounter for closed fracture    Nondisplaced fracture of third metatarsal bone, left foot, initial encounter for closed fracture    Malignant neoplasm of overlapping sites of breast in female, estrogen receptor negative       Past Surgical History:   Procedure Laterality Date    BRAIN AVM REPAIR      BREAST BIOPSY  left    2002    CRANIOTOMY  2006    CVA tumor removal  2006    FOOT FRACTURE SURGERY      VENTRICULOPERITONEAL SHUNT  2006    MAGNETIC - NO MRI          Results for orders placed or performed during the hospital encounter of 09/27/19   EKG 12-lead    Collection Time: 09/27/19  9:20 AM    Narrative    Test Reason : Z01.818,    Vent. Rate : 073 BPM     Atrial Rate : 073 BPM     P-R Int : 150 ms          QRS Dur : 074 ms      QT Int : 386 ms       P-R-T Axes : 032 044 036 degrees     QTc Int : 425 ms    Normal sinus rhythm  Normal ECG  When compared with ECG of 16-OCT-2012 13:48,  No significant change was found  Confirmed by Annmarie Willis MD ([id number]) on 9/27/2019 7:55:21 PM    Referred By: СВЕТЛАНА ELDER           Confirmed By:Annmarie Willis MD             Lab Results   Component Value Date    WBC 7.35 09/27/2019    HGB 13.0 09/27/2019    HCT 40.8 09/27/2019    MCV 92 09/27/2019     09/27/2019     BMP  Lab Results   Component Value Date     09/27/2019    K 3.8 09/27/2019     09/27/2019    CO2 31 (H) 09/27/2019    BUN 13 09/27/2019    CREATININE 0.8 09/27/2019    CALCIUM 8.8 09/27/2019    ANIONGAP 6 (L) 09/27/2019    ESTGFRAFRICA >60.0 09/27/2019    EGFRNONAA >60.0 09/27/2019             Pre-op Assessment    I have reviewed the Patient Summary Reports.    I have reviewed the Nursing Notes.   I have  reviewed the Medications.     Review of Systems  Anesthesia Hx:  History of prior surgery of interest to airway management or planning: Previous anesthesia: General Denies Family Hx of Anesthesia complications.   Denies Personal Hx of Anesthesia complications.   Social:  Former Smoker    Hematology/Oncology:        Current/Recent Cancer. Breast   Cardiovascular:  Cardiovascular Normal     Pulmonary:  Pulmonary Normal    Renal/:  Renal/ Normal     Hepatic/GI:   GERD, well controlled    Musculoskeletal:  Musculoskeletal Normal    Neurological:   CVA (h/o AVM, CVA 2006, s/p  shunt, uses walker for balance, good muscle strength), residual symptoms    Endocrine:  Endocrine Normal    Psych:   Claustrophobia         Physical Exam  General:  Well nourished, Obesity    Airway/Jaw/Neck:  Airway Findings: Mouth Opening: Normal Tongue: Normal  General Airway Assessment: Adult  Mallampati: II  Improves to III, II with phonation.  TM Distance: Normal, at least 6 cm  Jaw/Neck Findings:  Neck ROM: Normal ROM      Dental:  Dental Findings: (perm. lower bridge)   Chest/Lungs:  Chest/Lungs Findings: Clear to auscultation, Normal Respiratory Rate     Heart/Vascular:  Heart Findings: Rate: Normal  Rhythm: Regular Rhythm  Sounds: Normal     Abdomen:  Abdomen Findings: Normal    Musculoskeletal:  Musculoskeletal Findings: Normal   Skin:  Skin Findings: Normal    Mental Status:  Mental Status Findings:  Cooperative, Alert and Oriented         Anesthesia Plan  Type of Anesthesia, risks & benefits discussed:  Anesthesia Type:  general  Patient's Preference:   Intra-op Monitoring Plan: standard ASA monitors  Intra-op Monitoring Plan Comments:   Post Op Pain Control Plan: per primary service following discharge from PACU  Post Op Pain Control Plan Comments:   Induction:   IV  Beta Blocker:  Patient is on a Beta-Blocker and has received one dose within the past 24 hours (No further documentation required).       Informed Consent: Patient  understands risks and agrees with Anesthesia plan.  Questions answered. Anesthesia consent signed with patient.  ASA Score: 3     Day of Surgery Review of History & Physical: I have interviewed and examined the patient. I have reviewed the patient's H&P dated:  There are no significant changes.      Anesthesia Plan Notes: GETA  Zofran/Decadron

## 2019-09-30 NOTE — TELEPHONE ENCOUNTER
She has breast cancer.  I am treating her with Taxotere, Adriamycin, Cytoxan, TAC q 3 weeks x's 6 cycles.    Jaci, please call in Decadron 4 mg #12, Sig 2 po BID, with food x's 6 doses. Start on Jorge 10/6.  5 refills.    Jaci please get her scheduled for 10/6/19.  Get her date and time.    PT Navigator    Chemo school    Co pay assistance    Get auth for chemo and neulasta

## 2019-09-30 NOTE — TRANSFER OF CARE
Anesthesia Transfer of Care Note    Patient: Sergio Clifford    Procedure(s) Performed: Procedure(s) (LRB):  INSERTION, CATHETER (Right)    Patient location: PACU    Anesthesia Type: general    Transport from OR: Transported from OR on room air with adequate spontaneous ventilation    Post pain: adequate analgesia    Post assessment: no apparent anesthetic complications    Post vital signs: stable    Level of consciousness: awake, alert and oriented    Nausea/Vomiting: no nausea/vomiting    Complications: none    Transfer of care protocol was followed      Last vitals:   Visit Vitals  /82 (BP Location: Right arm, Patient Position: Lying)   Pulse 82   Temp 36.1 °C (97 °F) (Skin)   Resp 16   LMP 09/17/2019   SpO2 99%   Breastfeeding? No

## 2019-10-02 ENCOUNTER — CLINICAL SUPPORT (OUTPATIENT)
Dept: HEMATOLOGY/ONCOLOGY | Facility: CLINIC | Age: 39
End: 2019-10-02
Payer: MEDICARE

## 2019-10-02 ENCOUNTER — TELEPHONE (OUTPATIENT)
Dept: HEMATOLOGY/ONCOLOGY | Facility: CLINIC | Age: 39
End: 2019-10-02

## 2019-10-02 DIAGNOSIS — C50.812 MALIGNANT NEOPLASM OF OVERLAPPING SITES OF LEFT BREAST IN FEMALE, ESTROGEN RECEPTOR NEGATIVE: Primary | ICD-10-CM

## 2019-10-02 DIAGNOSIS — Z17.1 MALIGNANT NEOPLASM OF OVERLAPPING SITES OF LEFT BREAST IN FEMALE, ESTROGEN RECEPTOR NEGATIVE: Primary | ICD-10-CM

## 2019-10-02 NOTE — PROGRESS NOTES
Sergio Clifford  [id number]    [facility]   Cancer Center    TITLE: PLAN OF CARE FOR THE CHEMOTHERAPY PATIENT / TEACHING PROTOCOL    PURPOSE: To involve the patient / significant other in the plan of care and to provide teaching to the significant other & patient receiving chemotherapy.    LEVEL: Independent.    CONTENT: The Plan of Care for the chemotherapy patient is individualized and appropriate to the patients needs, strengths, limitations, & goals.  Education includes information regarding chemotherapy side effects, the treatment itself, and self-care  Activities.    GOAL / OUTCOME STANDARDS    PHYSIOLOGIC: The client will remain free or experience minimal side effects or toxicities throughout the chemotherapy treatment period.     PSYCHOLOGIC: The client/significant others will demonstrate positive coping mechanisms in relation to chemotherapy and its side effects.      COGINITIVE: The client/significant others will verbalize understanding of self-care measure to avoid/minimize side effects of the chemotherapy regime.    EVALUATION / COMMENT KEY:    V = Audiovisual/Video  S = Successfully meets outcome  N = Needs further instruction  NA = Not applicable to the patient  P = Previous knowledge  U = Unable to comprehend  * = See progress notes          PLAN OF CARE  INFORMATION TO BE DELIVERED / NURSING INTERVENTIONS DATE EVALUATION   1. Assessment of client/caregiver,         knowledge of cancer diagnosis,         and chemotherapy as a treatment. 1a. Evaluate patient/caregiver learning ability    b. Plan teaching sessions with patient/caregiver according to needs and present anxiety level/ability to learn.    c. Provide Chemotherapy Education Packet,        Mouth Care Protocol,         Specific Patient Education Sheets. 10/02/2019 S   2. Individual chemotherapy treatment         plan. 2a. Review of Chemotherapy Education handout from [employer]            10/02/2019   S   3. Knowledge  Deficit & Self-Management of general side effects common to all chemotherapy:  a. Nausea/Vomiting  b.   Diarrhea  c. Mouth Care  d. Dental care  e. Constipation  f. Hair Loss  g. Potential for infection  h. Fatigue   3a. Reinforce that the majority of side effects from chemotherapy are reversible and are  controlled both in the hospital and at home        (blood counts recover, hair grows back).   b.  Refer to the following for reinforcement of         information post-treatment:  1. Mouth Care Protocol.  2. Bowel Protocol for constipation or diarrhea.  3.  Drug Specific Chemotherapy Information Sheets for each medication patient receiving.    10/02/2019     S     PLAN OF CARE  INFORMATION TO BE DELIVERED / NURSING INTERVENTIONS DATE EVALUATION   h. Potential for bleeding         i. Potential anemia/fatigue         j. Potential sunburn         k. Birth control measures  l. Safety measures post treatment 4.  Chemotherapy Home Care Instruction  and Safety Information Sheet.  A. patient/caregivers to thoroughly cook shellfish (shrimp, crab, etc) to decrease the chance of infection.    B.  Use sunscreen and protective clothing while in the sun.   10/02/2019      4. Knowledge deficit & Self Management of Drug Specific  Side Effects.    a. BLADDER EFFECTS        (Hemorrhagic Cystitis)                  Preventable with adequate hydration; occurs 2-3 days or more post treatment.   1.  Instruct patient to:  a.   Void at least every 2 hours; increase intake.  b.   DO NOT hold urine; go when urge is felt.  c.    Empty bladder at bedtime and on         awakening.  d.   Observe for color changes (red to tea           colored), amount and frequency changes.  e.   Notify oncologist of any abnormalities           in urine or voiding or if you cannot               drink adequate fluids.   10/02/2019   S   b.   CHANGES IN URINE        COLOR:      1.   Instruct patient:  a.   Most evident in first 2-3 voidings after            administration.  b. Lasts less than 24 hours.  c. If urine is discolored 2 or more days post- treatment, notify oncologist.      10/02/2019 S   c.    KIDNEY EFFECTS           (Nephrotoxicity)   1.  Instruct patient to:  a.   Drink 8-16 glasses of fluid/day the day   pre-treatment and 3-4 days post-treatment to maintain hydration; the best way to minimize kidney problems.  b.   Notify oncologist immediately if unable to drink fluids or if changes are noted in urinary elimination.     10/02/2019   S   a. PULMONARY TOXICITY    1. Instruct patient to report symptoms such as shortness of breath, chest pain, shallow breathing, or chest wall discomfort to physician.  2. Reinforce preventative measures used by the health care team.  a. Baseline and periodic PFT and chest x-ray.   10/02/2019   S     PLAN OF CARE INFORMATION TO BE DELIVERED / NURSING INTERVENTIONS DATE EVALUATION   b. NERVE & MUSCLE EFFECTS (neurotoxocity; neuropathy, possible visual/hearing changes)        3. Instruct patient to:    a. Report numbness or tingling of the hands/feet, loss of fine motor movement (buttoning shirt, tying shoelaces), or gait changes to your oncologist.  b. If numbness/tingling are present:  1. protect feet with shoes at all times.  2. Use gloves for washing dishes/gardening & potholders in kitchen.       10/02/2019   S   c. CARDIOTOXICITY  Decreased effectiveness of             cardiac function. Effective are                  cumulative and irreversible.                                    CARDIAC ARRYTHMIAS              4   Instruct:  a. Heart function may be tested before treatment and perdiocally during treatment.  b. Notify oncologist of irregular pulse, palpitations, shortness of breath, or swelling in lower extremities/feet.          Taxol and Taxotere can cause arrhythmias on infusion that resolve once infusion discontinued. Instruct nurse if any irregularity felt.    10/02/2019   S   d. EXTRAVASTION  Occurs when vesicants  leak outside of vein and cause damage to the skin and underlying tissues.   1. Reinforce preventive measures used to avoid complications.  a. Fresh IV site or central line monitored continuously with vesicant IVP.  b. Continuous infusion via central line site and blood return monitored periodically around the clock.  2. Instruct to:  a. Notify nurse of any discomfort, burning, stinging, etc. at IV site during chemotherapy administration.  b. Notify oncologist of any redness, pain, or swelling at IV site after discharge from hospital.   10/02/2019   S   e. HYPERSENSITIVITY can happen with any medication.   1. Instruct patient:  a. Nurse is with them during the initial part of treatment and will be close by to monitor.  b. Pre-medication ordered by the oncologist must be taken on time. If doses are missed, treatment will need to be re-scheduled.  c. Skin redness, itching, or hives appearing after discharge should be reported to oncologist. 10/02/2019   S       PLAN OF CARE INFORMATION TO BE DELIVERED / NURSING INTERVENTIONS DATE EVALUATION   f. FLU-LIKE SYNDROME      1. Instruct patient symptoms are hard to prevent and may include fever, shaking chills, muscle and body aches.  a. Taking prescribed medications from physician if needed.  b. Adequate fluids are important.    2. Reinforce the need to call if temperature is         elevated to 100.4 or more  10/02/2019   S   g. HAND-FOOT SYNDROME  causes painful, symmetric swelling and redness of palms and soles                  5. Instruct patient to report any numbness or tingling in the hands or feet.  6. Explain prevention techniques, such as     a. Use heavy moisturizers to lessen skin dryness and itching, but to avoid if skin is cracked or broken  b. Bathe in tepid water, use non-perfumed soap, and wash gently. Baths with oatmeal or diluted baking soda may be soothing.  c. Avoid tight fitting shoes and repetitive actions, such as rubbing hands or applying pressure to  hands/feet.  7. Review measures to take should syndrome occur:  a. Cold compresses and elevation for          edema  b. Pain medications and other measures as ordered by oncologist.   4.   Syndrome resolves few weeks after therapy. 10/02/2019   S   5. DISCHARGE PLANNING /        EDUCATION 1.    Explain importance of compliance with follow- up  tests (CBC, CMP).  2.    Verify patient/caregiver know:  a.    Oncologists office phone number.  b.    Dates of follow-up appointments.  c.    Prescriptions given for nausea  3.   Review side effects to monitor and notify          oncologist about.  4.   Reinforce the need for patient and caregivers to:  a.    Review information given.  b.    Call oncologists office with questions          or symptoms  5.   Provide [facility] [place] [facility]chure make referrals if needed for financial or .   10/02/2019   S     PROGRESS NOTES:     I met with the patient and family today for chemotherapy education. she will be starting treatment with Taxotere, Adriamycin, Cytoxan . We discussed the mechanism of action, potential side effects of this treatment as well as ways she can manage them at home. Some of these side effects include but or not limited to fever, nausea, vomiting, decreased appetite, fatigue, weakness, cytopenias, myalgia/arthralgia, constipation, diarrhea, bleeding, headache, shortness of breath, nail changes, taste change, hair thinning/loss, mood disturbances, or edema. We also discussed dietary modifications she should make although this will be discussed in more detail with the dietician. she was provided with a copy of all of the information we discussed today as well as our contact information. she will be provided a schedule on his first day of treatment. We will obtain labs on a weekly basis and the patient will follow-up with the physician for toxicity monitoring throughout treatment. All questions were answered and an informed consent was obtained.  she was reminded to certainly contact us sooner if needed.  Attached to the patients folder and discussed with the patient the 24 hour/ 7 days a week after hours telephone number for the physician.  Patient notified to call anytime 24/7 because their is a physician on call for any problems that may arise.  Patient also notified to report to [facility] if they can not get in touch with a physician after hours.  Discussed the five wishes booklet with the patient and their family.

## 2019-10-02 NOTE — PROGRESS NOTES
Met with pt & family to discuss the financial obligations of Medicare only for chemotherapy tx.  She will owe 20%.  I talked about consideration of a Medicare Advantage plan (Open enrollment now) since she does not have a 2nd insurance and if she does not qualify for medicaid; then there would be an OOP that she does not have now.  She gave me her financial information and permission  to sign her up for a fer through [facility].

## 2019-10-03 NOTE — PROGRESS NOTES
CHEMO SCHOOL- NUTRITION    Sergio is a 38 yr old female with triple negative breast cancer. She will be receiving taxotere/ cytoxan/ adriamycin. I spoke with pt & family members for chemo school. Pt did not report any nutrition-related issues at this time. CW: 255#.     Plan:  1. Reviewed chemo school packet & provided copy to pt.   2. Discussed importance of maintaining wt & staying hydrated.   3. Reviewed food safety guidelines recommended during treatment.   4. Provided RD contact info & encouraged pt to call with any questions/concerns.   5. Will follow-up prn.

## 2019-10-08 ENCOUNTER — TELEPHONE (OUTPATIENT)
Dept: HEMATOLOGY/ONCOLOGY | Facility: CLINIC | Age: 39
End: 2019-10-08

## 2019-10-08 ENCOUNTER — INFUSION (OUTPATIENT)
Dept: INFUSION THERAPY | Facility: HOSPITAL | Age: 39
End: 2019-10-08
Attending: INTERNAL MEDICINE
Payer: MEDICARE

## 2019-10-08 ENCOUNTER — HOSPITAL ENCOUNTER (OUTPATIENT)
Dept: RADIOLOGY | Facility: HOSPITAL | Age: 39
Discharge: HOME | End: 2019-10-08
Attending: INTERNAL MEDICINE
Payer: MEDICARE

## 2019-10-08 ENCOUNTER — OFFICE VISIT (OUTPATIENT)
Dept: HEMATOLOGY/ONCOLOGY | Facility: CLINIC | Age: 39
End: 2019-10-08
Payer: MEDICARE

## 2019-10-08 VITALS
HEIGHT: 64 IN | HEART RATE: 91 BPM | DIASTOLIC BLOOD PRESSURE: 81 MMHG | TEMPERATURE: 98 F | SYSTOLIC BLOOD PRESSURE: 119 MMHG | BODY MASS INDEX: 42.73 KG/M2 | WEIGHT: 250.31 LBS | RESPIRATION RATE: 18 BRPM

## 2019-10-08 VITALS
RESPIRATION RATE: 18 BRPM | DIASTOLIC BLOOD PRESSURE: 79 MMHG | BODY MASS INDEX: 43 KG/M2 | WEIGHT: 250.5 LBS | TEMPERATURE: 98 F | SYSTOLIC BLOOD PRESSURE: 117 MMHG | HEART RATE: 78 BPM

## 2019-10-08 DIAGNOSIS — Z17.1 MALIGNANT NEOPLASM OF OVERLAPPING SITES OF LEFT BREAST IN FEMALE, ESTROGEN RECEPTOR NEGATIVE: ICD-10-CM

## 2019-10-08 DIAGNOSIS — T45.1X5A CHEMOTHERAPY INDUCED NEUTROPENIA: Primary | ICD-10-CM

## 2019-10-08 DIAGNOSIS — C50.812 MALIGNANT NEOPLASM OF OVERLAPPING SITES OF LEFT BREAST IN FEMALE, ESTROGEN RECEPTOR NEGATIVE: Primary | ICD-10-CM

## 2019-10-08 DIAGNOSIS — C50.812 MALIGNANT NEOPLASM OF OVERLAPPING SITES OF LEFT BREAST IN FEMALE, ESTROGEN RECEPTOR NEGATIVE: ICD-10-CM

## 2019-10-08 DIAGNOSIS — Z17.1 MALIGNANT NEOPLASM OF OVERLAPPING SITES OF LEFT BREAST IN FEMALE, ESTROGEN RECEPTOR NEGATIVE: Primary | ICD-10-CM

## 2019-10-08 DIAGNOSIS — D70.1 CHEMOTHERAPY INDUCED NEUTROPENIA: Primary | ICD-10-CM

## 2019-10-08 DIAGNOSIS — S92.345A NONDISPLACED FRACTURE OF FOURTH METATARSAL BONE, LEFT FOOT, INITIAL ENCOUNTER FOR CLOSED FRACTURE: ICD-10-CM

## 2019-10-08 DIAGNOSIS — Z86.79 HISTORY OF MITRAL VALVE PROLAPSE: ICD-10-CM

## 2019-10-08 DIAGNOSIS — D70.1 CHEMOTHERAPY INDUCED NEUTROPENIA: ICD-10-CM

## 2019-10-08 DIAGNOSIS — T45.1X5A CHEMOTHERAPY INDUCED NEUTROPENIA: ICD-10-CM

## 2019-10-08 PROCEDURE — 77001 FLUOROGUIDE FOR VEIN DEVICE: CPT | Mod: TC

## 2019-10-08 PROCEDURE — 96411 CHEMO IV PUSH ADDL DRUG: CPT

## 2019-10-08 PROCEDURE — 25000003 PHARM REV CODE 250: Performed by: INTERNAL MEDICINE

## 2019-10-08 PROCEDURE — 96375 TX/PRO/DX INJ NEW DRUG ADDON: CPT

## 2019-10-08 PROCEDURE — 99213 OFFICE O/P EST LOW 20 MIN: CPT | Mod: S$GLB,,, | Performed by: INTERNAL MEDICINE

## 2019-10-08 PROCEDURE — 99213 PR OFFICE/OUTPT VISIT, EST, LEVL III, 20-29 MIN: ICD-10-PCS | Mod: S$GLB,,, | Performed by: INTERNAL MEDICINE

## 2019-10-08 PROCEDURE — 25500020 PHARM REV CODE 255: Performed by: INTERNAL MEDICINE

## 2019-10-08 PROCEDURE — 96367 TX/PROPH/DG ADDL SEQ IV INF: CPT

## 2019-10-08 PROCEDURE — 96417 CHEMO IV INFUS EACH ADDL SEQ: CPT

## 2019-10-08 PROCEDURE — 63600175 PHARM REV CODE 636 W HCPCS: Performed by: INTERNAL MEDICINE

## 2019-10-08 PROCEDURE — 96413 CHEMO IV INFUSION 1 HR: CPT

## 2019-10-08 PROCEDURE — A4216 STERILE WATER/SALINE, 10 ML: HCPCS | Performed by: INTERNAL MEDICINE

## 2019-10-08 PROCEDURE — S0028 INJECTION, FAMOTIDINE, 20 MG: HCPCS | Performed by: INTERNAL MEDICINE

## 2019-10-08 RX ORDER — FAMOTIDINE 10 MG/ML
20 INJECTION INTRAVENOUS
Status: COMPLETED | OUTPATIENT
Start: 2019-10-08 | End: 2019-10-08

## 2019-10-08 RX ORDER — ACETAMINOPHEN 325 MG/1
650 TABLET ORAL ONCE
Status: COMPLETED | OUTPATIENT
Start: 2019-10-08 | End: 2019-10-08

## 2019-10-08 RX ORDER — DOXORUBICIN HYDROCHLORIDE 2 MG/ML
50 INJECTION, SOLUTION INTRAVENOUS
Status: COMPLETED | OUTPATIENT
Start: 2019-10-08 | End: 2019-10-08

## 2019-10-08 RX ORDER — HEPARIN 100 UNIT/ML
500 SYRINGE INTRAVENOUS
Status: DISCONTINUED | OUTPATIENT
Start: 2019-10-08 | End: 2019-10-08 | Stop reason: HOSPADM

## 2019-10-08 RX ORDER — SODIUM CHLORIDE 0.9 % (FLUSH) 0.9 %
10 SYRINGE (ML) INJECTION
Status: DISCONTINUED | OUTPATIENT
Start: 2019-10-08 | End: 2019-10-08 | Stop reason: HOSPADM

## 2019-10-08 RX ADMIN — FAMOTIDINE 20 MG: 10 INJECTION, SOLUTION INTRAVENOUS at 10:10

## 2019-10-08 RX ADMIN — HEPARIN 500 UNITS: 100 SYRINGE at 03:10

## 2019-10-08 RX ADMIN — SODIUM CHLORIDE: 0.9 INJECTION, SOLUTION INTRAVENOUS at 10:10

## 2019-10-08 RX ADMIN — ACETAMINOPHEN 650 MG: 325 TABLET ORAL at 10:10

## 2019-10-08 RX ADMIN — DEXAMETHASONE SODIUM PHOSPHATE: 4 INJECTION, SOLUTION INTRA-ARTICULAR; INTRALESIONAL; INTRAMUSCULAR; INTRAVENOUS; SOFT TISSUE at 10:10

## 2019-10-08 RX ADMIN — ONDANSETRON 16 MG: 2 INJECTION INTRAMUSCULAR; INTRAVENOUS at 11:10

## 2019-10-08 RX ADMIN — CYCLOPHOSPHAMIDE 1145 MG: 1 INJECTION, POWDER, FOR SOLUTION INTRAVENOUS; ORAL at 01:10

## 2019-10-08 RX ADMIN — DOCETAXEL ANHYDROUS 170 MG: 10 INJECTION, SOLUTION INTRAVENOUS at 02:10

## 2019-10-08 RX ADMIN — SODIUM CHLORIDE, PRESERVATIVE FREE 10 ML: 5 INJECTION INTRAVENOUS at 03:10

## 2019-10-08 RX ADMIN — DIPHENHYDRAMINE HYDROCHLORIDE 25 MG: 50 INJECTION INTRAMUSCULAR; INTRAVENOUS at 11:10

## 2019-10-08 RX ADMIN — DOXORUBICIN HYDROCHLORIDE 114 MG: 2 INJECTION, SOLUTION INTRAVENOUS at 12:10

## 2019-10-08 RX ADMIN — APREPITANT 130 MG: 130 INJECTION, EMULSION INTRAVENOUS at 12:10

## 2019-10-08 RX ADMIN — IOHEXOL 5 ML: 300 INJECTION, SOLUTION INTRAVENOUS at 10:10

## 2019-10-09 ENCOUNTER — INFUSION (OUTPATIENT)
Dept: INFUSION THERAPY | Facility: HOSPITAL | Age: 39
End: 2019-10-09
Attending: INTERNAL MEDICINE
Payer: MEDICARE

## 2019-10-09 VITALS
TEMPERATURE: 98 F | HEIGHT: 64 IN | WEIGHT: 254.88 LBS | RESPIRATION RATE: 18 BRPM | BODY MASS INDEX: 43.51 KG/M2 | HEART RATE: 76 BPM | DIASTOLIC BLOOD PRESSURE: 75 MMHG | SYSTOLIC BLOOD PRESSURE: 113 MMHG

## 2019-10-09 DIAGNOSIS — T45.1X5A CHEMOTHERAPY INDUCED NEUTROPENIA: Primary | ICD-10-CM

## 2019-10-09 DIAGNOSIS — D70.1 CHEMOTHERAPY INDUCED NEUTROPENIA: Primary | ICD-10-CM

## 2019-10-09 DIAGNOSIS — Z17.1 MALIGNANT NEOPLASM OF OVERLAPPING SITES OF LEFT BREAST IN FEMALE, ESTROGEN RECEPTOR NEGATIVE: ICD-10-CM

## 2019-10-09 DIAGNOSIS — C50.812 MALIGNANT NEOPLASM OF OVERLAPPING SITES OF LEFT BREAST IN FEMALE, ESTROGEN RECEPTOR NEGATIVE: ICD-10-CM

## 2019-10-09 PROCEDURE — 63600175 PHARM REV CODE 636 W HCPCS: Mod: TB | Performed by: INTERNAL MEDICINE

## 2019-10-09 PROCEDURE — 96365 THER/PROPH/DIAG IV INF INIT: CPT

## 2019-10-09 PROCEDURE — A4216 STERILE WATER/SALINE, 10 ML: HCPCS | Performed by: INTERNAL MEDICINE

## 2019-10-09 PROCEDURE — 25000003 PHARM REV CODE 250: Performed by: INTERNAL MEDICINE

## 2019-10-09 PROCEDURE — 96372 THER/PROPH/DIAG INJ SC/IM: CPT | Mod: 59

## 2019-10-09 RX ORDER — HEPARIN 100 UNIT/ML
500 SYRINGE INTRAVENOUS
Status: CANCELLED | OUTPATIENT
Start: 2019-10-09

## 2019-10-09 RX ORDER — SODIUM CHLORIDE 0.9 % (FLUSH) 0.9 %
10 SYRINGE (ML) INJECTION
Status: COMPLETED | OUTPATIENT
Start: 2019-10-09 | End: 2019-10-09

## 2019-10-09 RX ORDER — HEPARIN 100 UNIT/ML
500 SYRINGE INTRAVENOUS
Status: COMPLETED | OUTPATIENT
Start: 2019-10-09 | End: 2019-10-09

## 2019-10-09 RX ORDER — SODIUM CHLORIDE 0.9 % (FLUSH) 0.9 %
10 SYRINGE (ML) INJECTION
Status: CANCELLED | OUTPATIENT
Start: 2019-10-09

## 2019-10-09 RX ADMIN — SODIUM CHLORIDE, PRESERVATIVE FREE 10 ML: 5 INJECTION INTRAVENOUS at 03:10

## 2019-10-09 RX ADMIN — PEGFILGRASTIM-CBQV 6 MG: 6 INJECTION, SOLUTION SUBCUTANEOUS at 02:10

## 2019-10-09 RX ADMIN — ONDANSETRON 16 MG: 2 INJECTION INTRAMUSCULAR; INTRAVENOUS at 02:10

## 2019-10-09 RX ADMIN — HEPARIN 500 UNITS: 100 SYRINGE at 03:10

## 2019-10-12 NOTE — PROGRESS NOTES
[facility] History & Physical    Subjective:      Patient ID:   Sergio Clifford  38 y.o. female  1980  ELROY Roche MD Vesanth Bethala, MD Cynthia Jean Pierre, MD      Chief Complaint:   L breast cancer.    HPI:  38 y.o. female palpated a L breast lump at 4 o'clock.  The mass is painful.  Bx [id number]  [facility]  Invasive ductal carcinoma, grade 3,  ERP-, PRP-, H2N-.  Mamm 2.8 cm mass.    Hx GERD, anxiety, MVP.     L breast lump bx at [facility], benign.   craniotomy for AVM removal,  shunt placed.  W.  Last MRI, ,  She is claustrophobic.   L foot fx and repair.    M0  Menses onset 12, 1st child at 13.    Light smoker, none since .  ETOH socially.  Allergy PCN, rash.  Codeine, itch. Demeral, hallucinations.  Job in Sales.    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  Brother lymphangioma or mesenteric cyst.  Sister multiple sclerosis.  Sister HTN, obese, LBP.    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    Breast cancer 2019    CVA (cerebral infarction)     Edema     Fractures     left foot    MVP (mitral valve prolapse)     Reflux     Wears glasses      Past Surgical History:   Procedure Laterality Date    BRAIN AVM REPAIR      BREAST BIOPSY  left        CRANIOTOMY  2006    CVA tumor removal  2006    FOOT FRACTURE SURGERY      INSERTION OF CATHETER Right 2019    Procedure: INSERTION, CATHETER;  Surgeon:  Caitlyn Elkins MD;  Location: [facility];  Service: General;  Laterality: Right;    VENTRICULOPERITONEAL SHUNT  2006    MAGNETIC - NO MRI       Review of patient's allergies indicates:   Allergen Reactions    Codeine Itching    Demerol [meperidine] Other (See Comments)     hallucinations    Pcn [penicillins] Hives           Current Outpatient Medications:     betaxolol (KERLONE) 10 mg Tab, Take 5 mg by mouth once daily., Disp: , Rfl:     esomeprazole (NEXIUM) 20 MG capsule, Take 40 mg by mouth before breakfast. , Disp: , Rfl:     HYDROcodone-acetaminophen (NORCO) 5-325 mg per tablet, Take 1 tablet by mouth 3 (three) times daily as needed for Pain., Disp: 15 tablet, Rfl: 0    hydrocodone-acetaminophen (VICODIN) 5-500 mg per tablet, , Disp: , Rfl:     levocetirizine (XYZAL) 5 MG tablet, Take 5 mg by mouth., Disp: , Rfl:     nitrofurantoin, macrocrystal-monohydrate, (MACROBID) 100 MG capsule, , Disp: , Rfl:     sertraline (ZOLOFT) 50 MG tablet, Take 50 mg by mouth every morning., Disp: , Rfl: 3          Objective:   Vitals:  Blood pressure 117/79, pulse 78, temperature 98.2 °F (36.8 °C), resp. rate 18, weight 113.6 kg (250 lb 8 oz), last menstrual period 09/17/2019.    Physical Examination:   GEN: no apparent distress, comfortable  HEAD: atraumatic and normocephalic  EYES: no pallor, no icterus  ENT:  no pharyngeal erythema, external ears WNL; no nasal discharge  NECK: no masses, thyroid normal, trachea midline, no LAD/LN's, supple  CV: RRR with no murmur; normal pulse; normal S1 and S2; no pedal edema  CHEST: Normal respiratory effort; CTAB; normal breath sounds; no wheeze or crackles  ABDOM: nontender and nondistended; soft;  no rebound/guarding, L/S NP  MUSC/Skeletal: ROM normal; no crepitus; joints normal; no deformities   EXTREM: no clubbing, cyanosis, inflammation or swelling  SKIN: no rashes, lesions, ulcers, petechiae   : no cvat  NEURO: grossly intact; motor/sensory WNL;  no tremors,  Balance is off,  ambulates with walker since her craniotomy.  PSYCH: normal mood, affect and behavior  LYMPH: normal cervical, supraclavicular, axillary and groin LN's  L Breast has hard nodule at 4 o'clock L breast, tender.  R Breast no palpable mass      Labs:   Lab Results   Component Value Date    WBC 7.35 09/27/2019    HGB 13.0 09/27/2019    HCT 40.8 09/27/2019    MCV 92 09/27/2019     09/27/2019        Assessment:   (1) 38 y.o. female with diagnosis of L lower outer Breast cancer, 2.8 cm, grade 3, triple negative.  Stage 2 dx clinically by primary size.    (2)Discussed the rationale of neoadjuvant chemotherapy x's 4-6 cycles, before surgery to decrease mass size, try to achieve CRISTIAN with chemo, before surgery.    She is leaning to bilateral mastectomy, and reconstruction, she does not want Rad Rx. And does not lean towards conserving the L breast.  She wants breast reduction.    Port placement Dr. Elkins.  2 D Echo for ejection fraction.    Options AC, TAC, CTX-Taxotere.    Check BRCA 1 & 2.    RTC 9/24/19.  Start week of 9/30/19, after port is placed.    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          No diagnosis found.           There are no diagnoses linked to this encounter.  No follow-ups on file.    I have explained and the patient understands all of  the current recommendation(s). I have answered all of their questions to the best of my ability and to their complete satisfaction.       [facility] History & Physical    Subjective:      Patient ID:   Sergio Clifford  38 y.o. female  1980  ELROY Roche MD Vesanth Bethala, MD Cynthia Jean Pierre, MD      Chief Complaint:   L breast cancer.    HPI:  38 y.o. female palpated a L breast lump at 4 o'clock.  The mass was painful.  Bx [id number]  [facility]  Invasive ductal carcinoma, grade 3,  ERP-, PRP-, H2N-.  Mamm 2.8 cm mass.    Hx GERD, anxiety, MVP.    Port was placed, site tender, swollen but without redness and is  healing.    No blood return per the port.  Referred to Radiology to check placement under fluoroscopy.  Good placement and functional, but tip up against the wall of the vein.     L breast lump bx at [facility], benign.   craniotomy for AVM removal,  shunt placed.  W.  Last MRI, ,  She is claustrophobic.  2012 L foot fx and repair.    M0  Menses onset 12, 1st child at 13.    Light smoker, none since .  ETOH socially.  Allergy PCN, rash.  Codeine, itch. Demeral, hallucinations.  Job in Sales.    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  Brother lymphangioma or mesenteric cyst.  Sister multiple sclerosis.  Sister HTN, obese, LBP.    ROS:   GEN: normal without any fever, night sweats or weight loss  HEENT: See HPI  CV: normal with no CP, SOB, PND, GROVER or orthopnea  PULM: normal with no SOB, cough, hemoptysis, sputum or pleuritic pain  GI: normal with no abdominal pain, nausea, vomiting, constipation, diarrhea, melanotic stools, BRBPR, or hematemesis  : normal with no hematuria, dysuria  BREAST: See HPI  SKIN: normal with no rash, erythema, bruising, or swelling     Past Medical History:   Diagnosis Date    Anxiety 2006    AVM (arteriovenous malformation)     Balance disorder 2006    from AVM  uses walker    Breast cancer 2019    CVA (cerebral infarction)     Edema     Fractures     left foot    MVP (mitral valve prolapse)     Reflux     Wears glasses      Past Surgical History:   Procedure Laterality Date    BRAIN AVM REPAIR      BREAST BIOPSY  left        CRANIOTOMY  2006    CVA tumor removal  2006    FOOT FRACTURE SURGERY      INSERTION OF CATHETER Right 2019    Procedure: INSERTION, CATHETER;  Surgeon: Caitlyn Elkins MD;  Location: [facility];  Service: General;  Laterality: Right;    VENTRICULOPERITONEAL SHUNT  2006    MAGNETIC - NO MRI       Review of patient's allergies indicates:   Allergen Reactions    Codeine Itching    Demerol [meperidine] Other (See Comments)      hallucinations    Pcn [penicillins] Hives           Current Outpatient Medications:     betaxolol (KERLONE) 10 mg Tab, Take 5 mg by mouth once daily., Disp: , Rfl:     esomeprazole (NEXIUM) 20 MG capsule, Take 40 mg by mouth before breakfast. , Disp: , Rfl:     HYDROcodone-acetaminophen (NORCO) 5-325 mg per tablet, Take 1 tablet by mouth 3 (three) times daily as needed for Pain., Disp: 15 tablet, Rfl: 0    hydrocodone-acetaminophen (VICODIN) 5-500 mg per tablet, , Disp: , Rfl:     levocetirizine (XYZAL) 5 MG tablet, Take 5 mg by mouth., Disp: , Rfl:     nitrofurantoin, macrocrystal-monohydrate, (MACROBID) 100 MG capsule, , Disp: , Rfl:     sertraline (ZOLOFT) 50 MG tablet, Take 50 mg by mouth every morning., Disp: , Rfl: 3          Objective:   Vitals:  Blood pressure 117/79, pulse 78, temperature 98.2 °F (36.8 °C), resp. rate 18, weight 113.6 kg (250 lb 8 oz), last menstrual period 09/17/2019.    Physical Examination:   GEN: no apparent distress, comfortable  HEAD: atraumatic and normocephalic  EYES: no pallor, no icterus  ENT:  no pharyngeal erythema, external ears WNL; no nasal discharge  NECK: no masses, thyroid normal, trachea midline, no LAD/LN's, supple  CV: RRR with no murmur; normal pulse; normal S1 and S2; no pedal edema  CHEST: Normal respiratory effort; CTAB; normal breath sounds; no wheeze or crackles  ABDOM: nontender and nondistended; soft;  no rebound/guarding, L/S NP  MUSC/Skeletal: ROM normal; no crepitus; joints normal; no deformities   EXTREM: no clubbing, cyanosis, inflammation or swelling  SKIN: no rashes, lesions, ulcers, petechiae   : no cvat  NEURO: grossly intact; motor/sensory WNL;  no tremors,  Balance is off, ambulates with walker since her craniotomy.  PSYCH: normal mood, affect and behavior  LYMPH: normal cervical, supraclavicular, axillary and groin LN's  L Breast has hard nodule at 4 o'clock L breast, tender.  R Breast no palpable mass      Labs:   Lab Results    Component Value Date    WBC 7.35 09/27/2019    HGB 13.0 09/27/2019    HCT 40.8 09/27/2019    MCV 92 09/27/2019     09/27/2019      ECHO Ej Fx 60%.    Assessment:   (1) 38 y.o. female with diagnosis of L lower outer Breast cancer, 2.8 cm, grade 3, triple negative.  Stage 2 dx clinically by primary size.    (2)Discussed the rationale of neoadjuvant chemotherapy x's 4-6 cycles, before surgery to decrease mass size, try to achieve CRISTIAN with chemo, before surgery.    She is leaning to bilateral mastectomy, and reconstruction, she does not want Rad Rx. And does not lean towards conserving the L breast.  She wants breast reduction.    Port placement Dr. Elkins, done.  2 D Echo for ejection fraction is 60%.    TAC q 3 weeks x's 6 cycles.  This is one of the third generation aggressive regimens, offers us a good chance of CR.    Significant pancytopenia, myelosuppression expected,  Follow CBC weekly, neulasta support.    N/V control with Zofran, Phenergan, Decadron.    Temporary hair loss expected with chemo, sometimes permanent hair loss with Taxotere.    Premeds discussed including Decadron, benadryl, pepcid, Zofran.    Watch for peripheral neuropathy 2nd Taxotere.    Base line Ej Fx 60%.  Potential for cardicac toxicity with adriamycin usage <15% expected.  Follow with intermittent ECHO during Rx.    She attended chemo school.  Today D1C1 TAC, neulasta support.    Check BRCA 1 & 2.    See me 2-3 weeks.

## 2019-10-17 ENCOUNTER — OFFICE VISIT (OUTPATIENT)
Dept: HEMATOLOGY/ONCOLOGY | Facility: CLINIC | Age: 39
End: 2019-10-17
Payer: MEDICARE

## 2019-10-17 VITALS
DIASTOLIC BLOOD PRESSURE: 89 MMHG | SYSTOLIC BLOOD PRESSURE: 127 MMHG | OXYGEN SATURATION: 99 % | BODY MASS INDEX: 43.26 KG/M2 | TEMPERATURE: 98 F | HEART RATE: 86 BPM | WEIGHT: 252 LBS

## 2019-10-17 DIAGNOSIS — T45.1X5A CHEMOTHERAPY INDUCED NEUTROPENIA: ICD-10-CM

## 2019-10-17 DIAGNOSIS — Z86.79 HISTORY OF MITRAL VALVE PROLAPSE: ICD-10-CM

## 2019-10-17 DIAGNOSIS — Z17.1 MALIGNANT NEOPLASM OF OVERLAPPING SITES OF LEFT BREAST IN FEMALE, ESTROGEN RECEPTOR NEGATIVE: Primary | ICD-10-CM

## 2019-10-17 DIAGNOSIS — C50.812 MALIGNANT NEOPLASM OF OVERLAPPING SITES OF LEFT BREAST IN FEMALE, ESTROGEN RECEPTOR NEGATIVE: Primary | ICD-10-CM

## 2019-10-17 DIAGNOSIS — D70.1 CHEMOTHERAPY INDUCED NEUTROPENIA: ICD-10-CM

## 2019-10-17 PROCEDURE — 99213 OFFICE O/P EST LOW 20 MIN: CPT | Mod: S$GLB,,, | Performed by: INTERNAL MEDICINE

## 2019-10-17 PROCEDURE — 99213 PR OFFICE/OUTPT VISIT, EST, LEVL III, 20-29 MIN: ICD-10-PCS | Mod: S$GLB,,, | Performed by: INTERNAL MEDICINE

## 2019-10-21 NOTE — PROGRESS NOTES
[facility] History & Physical    Subjective:      Patient ID:   Sergio Clifford  38 y.o. female  1980  ELROY Roche MD Vesanth Bethala, MD Cynthia Jean Pierre, MD      Chief Complaint:   L breast cancer.    HPI:  38 y.o. female palpated a L breast lump at 4 o'clock.  The mass is painful.  Bx [id number]  [facility]  Invasive ductal carcinoma, grade 3,  ERP-, PRP-, H2N-.  Mamm 2.8 cm mass.    Hx GERD, anxiety, MVP.     L breast lump bx at [facility], benign.   craniotomy for AVM removal,  shunt placed.  W.  Last MRI, ,  She is claustrophobic.   L foot fx and repair.    M0  Menses onset 12, 1st child at 13.    Light smoker, none since .  ETOH socially.  Allergy PCN, rash.  Codeine, itch. Demeral, hallucinations.  Job in Sales.    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  Brother lymphangioma or mesenteric cyst.  Sister multiple sclerosis.  Sister HTN, obese, LBP.    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    Breast cancer 2019    CVA (cerebral infarction)     Edema     Fractures     left foot    MVP (mitral valve prolapse)     Reflux     Wears glasses      Past Surgical History:   Procedure Laterality Date    BRAIN AVM REPAIR      BREAST BIOPSY  left        CRANIOTOMY  2006    CVA tumor removal  2006    FOOT FRACTURE SURGERY      INSERTION OF CATHETER Right 2019    Procedure: INSERTION, CATHETER;  Surgeon:  Caitlyn Elkins MD;  Location: [facility];  Service: General;  Laterality: Right;    VENTRICULOPERITONEAL SHUNT  2006    MAGNETIC - NO MRI       Review of patient's allergies indicates:   Allergen Reactions    Codeine Itching    Demerol [meperidine] Other (See Comments)     hallucinations    Pcn [penicillins] Hives           Current Outpatient Medications:     betaxolol (KERLONE) 10 mg Tab, Take 5 mg by mouth once daily., Disp: , Rfl:     esomeprazole (NEXIUM) 20 MG capsule, Take 40 mg by mouth before breakfast. , Disp: , Rfl:     HYDROcodone-acetaminophen (NORCO) 5-325 mg per tablet, Take 1 tablet by mouth 3 (three) times daily as needed for Pain., Disp: 15 tablet, Rfl: 0    hydrocodone-acetaminophen (VICODIN) 5-500 mg per tablet, , Disp: , Rfl:     levocetirizine (XYZAL) 5 MG tablet, Take 5 mg by mouth., Disp: , Rfl:     nitrofurantoin, macrocrystal-monohydrate, (MACROBID) 100 MG capsule, , Disp: , Rfl:     sertraline (ZOLOFT) 50 MG tablet, Take 50 mg by mouth every morning., Disp: , Rfl: 3          Objective:   Vitals:  Blood pressure 127/89, pulse 86, temperature 98.1 °F (36.7 °C), weight 114.3 kg (252 lb), last menstrual period 09/17/2019, SpO2 99 %.    Physical Examination:   GEN: no apparent distress, comfortable  HEAD: atraumatic and normocephalic  EYES: no pallor, no icterus  ENT:  no pharyngeal erythema, external ears WNL; no nasal discharge  NECK: no masses, thyroid normal, trachea midline, no LAD/LN's, supple  CV: RRR with no murmur; normal pulse; normal S1 and S2; no pedal edema  CHEST: Normal respiratory effort; CTAB; normal breath sounds; no wheeze or crackles  ABDOM: nontender and nondistended; soft;  no rebound/guarding, L/S NP  MUSC/Skeletal: ROM normal; no crepitus; joints normal; no deformities   EXTREM: no clubbing, cyanosis, inflammation or swelling  SKIN: no rashes, lesions, ulcers, petechiae   : no cvat  NEURO: grossly intact; motor/sensory WNL;  no tremors,  Balance is off, ambulates  with walker since her craniotomy.  PSYCH: normal mood, affect and behavior  LYMPH: normal cervical, supraclavicular, axillary and groin LN's  L Breast has hard nodule at 4 o'clock L breast, tender.  R Breast no palpable mass      Labs:   Lab Results   Component Value Date    WBC 7.35 09/27/2019    HGB 13.0 09/27/2019    HCT 40.8 09/27/2019    MCV 92 09/27/2019     09/27/2019        Assessment:   (1) 38 y.o. female with diagnosis of L lower outer Breast cancer, 2.8 cm, grade 3, triple negative.  Stage 2 dx clinically by primary size.    (2)Discussed the rationale of neoadjuvant chemotherapy x's 4-6 cycles, before surgery to decrease mass size, try to achieve CRISTIAN with chemo, before surgery.    She is leaning to bilateral mastectomy, and reconstruction, she does not want Rad Rx. And does not lean towards conserving the L breast.  She wants breast reduction.    Port placement Dr. Elkins.  2 D Echo for ejection fraction.    Options AC, TAC, CTX-Taxotere.    Check BRCA 1 & 2.    RTC 9/24/19.  Start week of 9/30/19, after port is placed.    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          No diagnosis found.           There are no diagnoses linked to this encounter.  No follow-ups on file.    I have explained and the patient understands all of  the current recommendation(s). I have answered all of their questions to the best of my ability and to their complete satisfaction.       [facility] History & Physical    Subjective:      Patient ID:   Sergio Clifford  38 y.o. female  1980  ELROY Roche MD Vesanth Bethala, MD Cynthia Jean Pierre, MD      Chief Complaint:   L breast cancer.    HPI:  38 y.o. female palpated a L breast lump at 4 o'clock.  The mass was painful.  Bx [id number]  [facility]  Invasive ductal carcinoma, grade 3,  ERP-, PRP-, H2N-.  Mamm 2.8 cm mass.    On TAC x's 1 cycle.    Dr. Elkins saw her for suture infection, on cipro, bactrim.    Hx GERD, anxiety,  MVP.    Port was placed, site tender, swollen but without redness and is healing.  Open area on chest wall, trying to heal.    No blood return per the port.  Referred to Radiology to check placement under fluoroscopy.  Good placement and functional, but tip up against the wall of the vein.     L breast lump bx at [facility], benign.   craniotomy for AVM removal,  shunt placed.  W.  Last MRI, ,  She is claustrophobic.  2012 L foot fx and repair.    M0  Menses onset 12, 1st child at 13.    Light smoker, none since .  ETOH socially.  Allergy PCN, rash.  Codeine, itch. Demeral, hallucinations.  Job in Sales.    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  Brother lymphangioma or mesenteric cyst.  Sister multiple sclerosis.  Sister HTN, obese, LBP.    ROS:   GEN: normal without any fever, night sweats or weight loss  HEENT: See HPI  CV: normal with no CP, SOB, PND, GROVER or orthopnea  PULM: normal with no SOB, cough, hemoptysis, sputum or pleuritic pain  GI: normal with no abdominal pain, nausea, vomiting, constipation, diarrhea, melanotic stools, BRBPR, or hematemesis  : normal with no hematuria, dysuria  BREAST: See HPI  SKIN: normal with no rash, erythema, bruising, or swelling     Past Medical History:   Diagnosis Date    Anxiety 2006    AVM (arteriovenous malformation)     Balance disorder 2006    from AVM  uses walker    Breast cancer 2019    CVA (cerebral infarction)     Edema     Fractures     left foot    MVP (mitral valve prolapse)     Reflux     Wears glasses      Past Surgical History:   Procedure Laterality Date    BRAIN AVM REPAIR      BREAST BIOPSY  left    2002    CRANIOTOMY  2006    CVA tumor removal  2006    FOOT FRACTURE SURGERY      INSERTION OF CATHETER Right 2019    Procedure: INSERTION, CATHETER;  Surgeon: Caitlyn Elkins MD;  Location: [facility] OR;  Service: General;  Laterality: Right;    VENTRICULOPERITONEAL SHUNT  2006    MAGNETIC - NO MRI       Review of  patient's allergies indicates:   Allergen Reactions    Codeine Itching    Demerol [meperidine] Other (See Comments)     hallucinations    Pcn [penicillins] Hives           Current Outpatient Medications:     betaxolol (KERLONE) 10 mg Tab, Take 5 mg by mouth once daily., Disp: , Rfl:     esomeprazole (NEXIUM) 20 MG capsule, Take 40 mg by mouth before breakfast. , Disp: , Rfl:     HYDROcodone-acetaminophen (NORCO) 5-325 mg per tablet, Take 1 tablet by mouth 3 (three) times daily as needed for Pain., Disp: 15 tablet, Rfl: 0    hydrocodone-acetaminophen (VICODIN) 5-500 mg per tablet, , Disp: , Rfl:     levocetirizine (XYZAL) 5 MG tablet, Take 5 mg by mouth., Disp: , Rfl:     nitrofurantoin, macrocrystal-monohydrate, (MACROBID) 100 MG capsule, , Disp: , Rfl:     sertraline (ZOLOFT) 50 MG tablet, Take 50 mg by mouth every morning., Disp: , Rfl: 3          Objective:   Vitals:  Blood pressure 127/89, pulse 86, temperature 98.1 °F (36.7 °C), weight 114.3 kg (252 lb), last menstrual period 09/17/2019, SpO2 99 %.    Physical Examination:   GEN: no apparent distress, comfortable  HEAD: atraumatic and normocephalic  EYES: no pallor, no icterus  ENT:  no pharyngeal erythema, external ears WNL; no nasal discharge  NECK: no masses, thyroid normal, trachea midline, no LAD/LN's, supple  CV: RRR with no murmur; normal pulse; normal S1 and S2; no pedal edema  CHEST: Normal respiratory effort; CTAB; normal breath sounds; no wheeze or crackles  ABDOM: nontender and nondistended; soft;  no rebound/guarding, L/S NP  MUSC/Skeletal: ROM normal; no crepitus; joints normal; no deformities   EXTREM: no clubbing, cyanosis, inflammation or swelling  SKIN: chest area abraded, trying to heal.  : no cvat  NEURO: grossly intact; motor/sensory WNL;  no tremors,  Balance is off, ambulates with walker since her craniotomy.  PSYCH: normal mood, affect and behavior  LYMPH: normal cervical, supraclavicular, axillary and groin LN's  L Breast  has hard nodule at 4 o'clock L breast, tender.  Some what increased in size, since last exam.  R Breast no palpable mass.      Labs:   Lab Results   Component Value Date    WBC 7.35 09/27/2019    HGB 13.0 09/27/2019    HCT 40.8 09/27/2019    MCV 92 09/27/2019     09/27/2019      ECHO Ej Fx 60%.    Assessment:   (1) 38 y.o. female with diagnosis of L lower outer Breast cancer, 2.8 cm, grade 3, triple negative.  Stage 2 dx clinically by primary size.    (2)Discussed the rationale of neoadjuvant chemotherapy x's 4-6 cycles, before surgery to decrease mass size, try to achieve CRISTIAN with chemo, before surgery.    She is leaning to bilateral mastectomy, and reconstruction, she does not want Rad Rx. And does not lean towards conserving the L breast.  She wants breast reduction.    Port placement Dr. Elkins, done.  2 D Echo for ejection fraction is 60%.    TAC q 3 weeks x's 6 cycles.  This is one of the third generation aggressive regimens, offers us a good chance of CR.    Significant pancytopenia, myelosuppression expected,  Follow CBC weekly, neulasta support.    N/V control with Zofran, Phenergan, Decadron.    Temporary hair loss expected with chemo, sometimes permanent hair loss with Taxotere.    Premeds discussed including Decadron, benadryl, pepcid, Zofran.    Watch for peripheral neuropathy 2nd Taxotere.    Base line Ej Fx 60%.  Potential for cardicac toxicity with adriamycin usage <15% expected.  Follow with intermittent ECHO during Rx.    She attended chemo school.  S/P D1C1 TAC, neulasta support.    Check BRCA 1 & 2.    RTC 1 week, for exam.

## 2019-10-23 ENCOUNTER — TELEPHONE (OUTPATIENT)
Dept: HEMATOLOGY/ONCOLOGY | Facility: CLINIC | Age: 39
End: 2019-10-23

## 2019-10-23 NOTE — TELEPHONE ENCOUNTER
----- Message from Juli Franklin sent at 10/21/2019  4:08 PM CDT -----  The patient wants to know if she can get her nails done. She said they dip them in powder to make them hard and file them. Please call her back at [phone].

## 2019-10-24 ENCOUNTER — OFFICE VISIT (OUTPATIENT)
Dept: HEMATOLOGY/ONCOLOGY | Facility: CLINIC | Age: 39
End: 2019-10-24
Payer: MEDICARE

## 2019-10-24 VITALS
TEMPERATURE: 97 F | SYSTOLIC BLOOD PRESSURE: 125 MMHG | BODY MASS INDEX: 43.6 KG/M2 | DIASTOLIC BLOOD PRESSURE: 79 MMHG | OXYGEN SATURATION: 98 % | WEIGHT: 254 LBS | HEART RATE: 77 BPM

## 2019-10-24 DIAGNOSIS — Z17.1 MALIGNANT NEOPLASM OF OVERLAPPING SITES OF LEFT BREAST IN FEMALE, ESTROGEN RECEPTOR NEGATIVE: Primary | ICD-10-CM

## 2019-10-24 DIAGNOSIS — T45.1X5A CHEMOTHERAPY INDUCED NEUTROPENIA: ICD-10-CM

## 2019-10-24 DIAGNOSIS — D70.1 CHEMOTHERAPY INDUCED NEUTROPENIA: ICD-10-CM

## 2019-10-24 DIAGNOSIS — C50.812 MALIGNANT NEOPLASM OF OVERLAPPING SITES OF LEFT BREAST IN FEMALE, ESTROGEN RECEPTOR NEGATIVE: Primary | ICD-10-CM

## 2019-10-24 PROCEDURE — 99213 PR OFFICE/OUTPT VISIT, EST, LEVL III, 20-29 MIN: ICD-10-PCS | Mod: S$GLB,,, | Performed by: INTERNAL MEDICINE

## 2019-10-24 PROCEDURE — 99213 OFFICE O/P EST LOW 20 MIN: CPT | Mod: S$GLB,,, | Performed by: INTERNAL MEDICINE

## 2019-10-24 NOTE — PROGRESS NOTES
[facility] History & Physical    Subjective:      Patient ID:   Sergio Clifford  38 y.o. female  1980  ELROY Roche MD Vesanth Bethala, MD Cynthia Jean Pierre, MD      Chief Complaint:   L breast cancer.    HPI:  38 y.o. female palpated a L breast lump at 4 o'clock.  The mass is now smaller and less painful, alopecia beginning.  [id number]  Invasive ductal carcinoma, grade 3,  ERP-, PRP-, H2N-.  Mamm 2.8 cm mass.    Hx GERD, anxiety, MVP.     L breast lump bx at [facility], benign.   craniotomy for AVM removal,  shunt placed.  W.  Last MRI, ,  She is claustrophobic.   L foot fx and repair.    M0  Menses onset 12, 1st child at 13.    Light smoker, none since .  ETOH socially.  Allergy PCN, rash.  Codeine, itch. Demeral, hallucinations.  Job in Sales.    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  Brother lymphangioma or mesenteric cyst.  Sister multiple sclerosis.  Sister HTN, obese, LBP.    ROS:   GEN: normal without any fever, night sweats or weight loss  HEENT: See HPI  CV: normal with no CP, SOB, PND, GROVER or orthopnea  PULM: normal with no SOB, cough, hemoptysis, sputum or pleuritic pain  GI: normal with no abdominal pain, nausea, vomiting, constipation, diarrhea, melanotic stools, BRBPR, or hematemesis  : normal with no hematuria, dysuria  BREAST: See HPI  SKIN: normal with no rash, erythema, bruising, or swelling     Past Medical History:   Diagnosis Date    Anxiety 2006    AVM (arteriovenous malformation)     Balance disorder 2006    from AVM  uses walker    Breast cancer 2019    CVA (cerebral infarction)     Edema     Fractures     left foot    MVP (mitral valve prolapse)     Reflux     Wears glasses      Past Surgical History:   Procedure Laterality Date    BRAIN AVM REPAIR      BREAST BIOPSY  left        CRANIOTOMY  2006    CVA tumor removal  2006    FOOT FRACTURE SURGERY      INSERTION OF CATHETER Right 2019     Procedure: INSERTION, CATHETER;  Surgeon: Caitlyn Elkins MD;  Location[facility] OR;  Service: General;  Laterality: Right;    VENTRICULOPERITONEAL SHUNT  2006    MAGNETIC - NO MRI       Review of patient's allergies indicates:   Allergen Reactions    Codeine Itching    Demerol [meperidine] Other (See Comments)     hallucinations    Pcn [penicillins] Hives           Current Outpatient Medications:     betaxolol (KERLONE) 10 mg Tab, Take 5 mg by mouth once daily., Disp: , Rfl:     esomeprazole (NEXIUM) 20 MG capsule, Take 40 mg by mouth before breakfast. , Disp: , Rfl:     HYDROcodone-acetaminophen (NORCO) 5-325 mg per tablet, Take 1 tablet by mouth 3 (three) times daily as needed for Pain., Disp: 15 tablet, Rfl: 0    hydrocodone-acetaminophen (VICODIN) 5-500 mg per tablet, , Disp: , Rfl:     levocetirizine (XYZAL) 5 MG tablet, Take 5 mg by mouth., Disp: , Rfl:     nitrofurantoin, macrocrystal-monohydrate, (MACROBID) 100 MG capsule, , Disp: , Rfl:     sertraline (ZOLOFT) 50 MG tablet, Take 50 mg by mouth every morning., Disp: , Rfl: 3          Objective:   Vitals:  There were no vitals taken for this visit.    Physical Examination:   GEN: no apparent distress, comfortable  HEAD: atraumatic and normocephalic.  Alopecia beginning.  EYES: no pallor, no icterus  ENT:  no pharyngeal erythema, external ears WNL; no nasal discharge  NECK: no masses, thyroid normal, trachea midline, no LAD/LN's, supple  CV: RRR with no murmur; normal pulse; normal S1 and S2; no pedal edema  CHEST: Normal respiratory effort; CTAB; normal breath sounds; no wheeze or crackles  ABDOM: nontender and nondistended; soft;  no rebound/guarding, L/S NP  MUSC/Skeletal: ROM normal; no crepitus; joints normal; no deformities   EXTREM: no clubbing, cyanosis, inflammation or swelling  SKIN: no rashes, lesions, ulcers, petechiae   : no cvat  NEURO: grossly intact; motor/sensory WNL;  no tremors,  Balance is off, ambulates with walker since her  craniotomy.  PSYCH: normal mood, affect and behavior  LYMPH: normal cervical, supraclavicular, axillary and groin LN's  L Breast has hard nodule at 4 o'clock L breast, tender.  But decreasing size and tenderness since last weeks exam.  R Breast no palpable mass      Labs:   Lab Results   Component Value Date    WBC 7.35 09/27/2019    HGB 13.0 09/27/2019    HCT 40.8 09/27/2019    MCV 92 09/27/2019     09/27/2019        Assessment:   (1) 38 y.o. female with diagnosis of L lower outer Breast cancer, 2.8 cm, grade 3, triple negative.  Stage 2 dx clinically by primary size.    (2)Discussed the rationale of neoadjuvant chemotherapy x's 4-6 cycles, before surgery to decrease mass size, try to achieve CRISTIAN with chemo, before surgery.    She is leaning to bilateral mastectomy, and reconstruction, she does not want Rad Rx. And does not lean towards conserving the L breast.  She wants breast reduction.    Port placement Dr. Elkins.  2 D Echo for ejection fraction was NL at 60%.    Options AC, TAC, CTX-Taxotere.    Check BRCA 1 & 2.    S/P 1st cycle TAC.  Clinically better.  Neutropenia 2nd chemo.  Call me if temp > 100.4 or chills,  May indicate infection.    D1C2 on 10/29/19 if counts are up enough.  RTC 3 weeks [place] office.

## 2019-10-29 ENCOUNTER — HOSPITAL ENCOUNTER (OUTPATIENT)
Dept: RADIOLOGY | Facility: HOSPITAL | Age: 39
Discharge: HOME | End: 2019-10-29
Attending: INTERNAL MEDICINE
Payer: MEDICARE

## 2019-10-29 ENCOUNTER — INFUSION (OUTPATIENT)
Dept: INFUSION THERAPY | Facility: HOSPITAL | Age: 39
End: 2019-10-29
Attending: INTERNAL MEDICINE
Payer: MEDICARE

## 2019-10-29 ENCOUNTER — TELEPHONE (OUTPATIENT)
Dept: HEMATOLOGY/ONCOLOGY | Facility: CLINIC | Age: 39
End: 2019-10-29

## 2019-10-29 VITALS
TEMPERATURE: 99 F | SYSTOLIC BLOOD PRESSURE: 122 MMHG | BODY MASS INDEX: 43.19 KG/M2 | HEIGHT: 64 IN | DIASTOLIC BLOOD PRESSURE: 77 MMHG | WEIGHT: 253 LBS | HEART RATE: 73 BPM | RESPIRATION RATE: 18 BRPM

## 2019-10-29 DIAGNOSIS — R07.89 CHEST WALL PAIN: ICD-10-CM

## 2019-10-29 DIAGNOSIS — C50.812 MALIGNANT NEOPLASM OF OVERLAPPING SITES OF LEFT BREAST IN FEMALE, ESTROGEN RECEPTOR NEGATIVE: ICD-10-CM

## 2019-10-29 DIAGNOSIS — Z17.1 MALIGNANT NEOPLASM OF OVERLAPPING SITES OF LEFT BREAST IN FEMALE, ESTROGEN RECEPTOR NEGATIVE: ICD-10-CM

## 2019-10-29 DIAGNOSIS — D70.1 CHEMOTHERAPY INDUCED NEUTROPENIA: Primary | ICD-10-CM

## 2019-10-29 DIAGNOSIS — T45.1X5A CHEMOTHERAPY INDUCED NEUTROPENIA: Primary | ICD-10-CM

## 2019-10-29 DIAGNOSIS — R07.89 CHEST WALL PAIN: Primary | ICD-10-CM

## 2019-10-29 PROCEDURE — S0028 INJECTION, FAMOTIDINE, 20 MG: HCPCS | Performed by: INTERNAL MEDICINE

## 2019-10-29 PROCEDURE — 77001 FLUOROGUIDE FOR VEIN DEVICE: CPT | Mod: TC

## 2019-10-29 PROCEDURE — 77001 FLUOROGUIDE FOR VEIN DEVICE: CPT

## 2019-10-29 PROCEDURE — 36598 INJ W/FLUOR EVAL CV DEVICE: CPT

## 2019-10-29 PROCEDURE — A4216 STERILE WATER/SALINE, 10 ML: HCPCS | Performed by: INTERNAL MEDICINE

## 2019-10-29 PROCEDURE — 25500020 PHARM REV CODE 255: Performed by: INTERNAL MEDICINE

## 2019-10-29 PROCEDURE — 96367 TX/PROPH/DG ADDL SEQ IV INF: CPT

## 2019-10-29 PROCEDURE — 96417 CHEMO IV INFUS EACH ADDL SEQ: CPT

## 2019-10-29 PROCEDURE — 63600175 PHARM REV CODE 636 W HCPCS: Performed by: INTERNAL MEDICINE

## 2019-10-29 PROCEDURE — 96413 CHEMO IV INFUSION 1 HR: CPT

## 2019-10-29 PROCEDURE — 25000003 PHARM REV CODE 250: Performed by: INTERNAL MEDICINE

## 2019-10-29 PROCEDURE — 96411 CHEMO IV PUSH ADDL DRUG: CPT

## 2019-10-29 PROCEDURE — 96375 TX/PRO/DX INJ NEW DRUG ADDON: CPT

## 2019-10-29 RX ORDER — SODIUM CHLORIDE 0.9 % (FLUSH) 0.9 %
10 SYRINGE (ML) INJECTION
Status: CANCELLED | OUTPATIENT
Start: 2019-10-29

## 2019-10-29 RX ORDER — FAMOTIDINE 10 MG/ML
20 INJECTION INTRAVENOUS
Status: COMPLETED | OUTPATIENT
Start: 2019-10-29 | End: 2019-10-29

## 2019-10-29 RX ORDER — DOXORUBICIN HYDROCHLORIDE 2 MG/ML
50 INJECTION, SOLUTION INTRAVENOUS
Status: CANCELLED | OUTPATIENT
Start: 2019-10-29

## 2019-10-29 RX ORDER — HEPARIN 100 UNIT/ML
500 SYRINGE INTRAVENOUS
Status: DISCONTINUED | OUTPATIENT
Start: 2019-10-29 | End: 2019-10-29 | Stop reason: HOSPADM

## 2019-10-29 RX ORDER — SODIUM CHLORIDE 0.9 % (FLUSH) 0.9 %
10 SYRINGE (ML) INJECTION
Status: DISCONTINUED | OUTPATIENT
Start: 2019-10-29 | End: 2019-10-29 | Stop reason: HOSPADM

## 2019-10-29 RX ORDER — HEPARIN 100 UNIT/ML
500 SYRINGE INTRAVENOUS
Status: CANCELLED | OUTPATIENT
Start: 2019-10-29

## 2019-10-29 RX ORDER — DOXORUBICIN HYDROCHLORIDE 2 MG/ML
50 INJECTION, SOLUTION INTRAVENOUS
Status: COMPLETED | OUTPATIENT
Start: 2019-10-29 | End: 2019-10-29

## 2019-10-29 RX ORDER — ACETAMINOPHEN 325 MG/1
650 TABLET ORAL ONCE
Status: CANCELLED
Start: 2019-10-29

## 2019-10-29 RX ORDER — FAMOTIDINE 10 MG/ML
20 INJECTION INTRAVENOUS
Status: CANCELLED
Start: 2019-10-29

## 2019-10-29 RX ORDER — ACETAMINOPHEN 325 MG/1
650 TABLET ORAL ONCE
Status: COMPLETED | OUTPATIENT
Start: 2019-10-29 | End: 2019-10-29

## 2019-10-29 RX ADMIN — CYCLOPHOSPHAMIDE 1145 MG: 1 INJECTION, POWDER, FOR SOLUTION INTRAVENOUS; ORAL at 11:10

## 2019-10-29 RX ADMIN — FAMOTIDINE 20 MG: 10 INJECTION, SOLUTION INTRAVENOUS at 09:10

## 2019-10-29 RX ADMIN — IOHEXOL 16 ML: 300 INJECTION, SOLUTION INTRAVENOUS at 04:10

## 2019-10-29 RX ADMIN — ACETAMINOPHEN 650 MG: 325 TABLET ORAL at 09:10

## 2019-10-29 RX ADMIN — SODIUM CHLORIDE, PRESERVATIVE FREE 10 ML: 5 INJECTION INTRAVENOUS at 09:10

## 2019-10-29 RX ADMIN — DOCETAXEL ANHYDROUS 170 MG: 10 INJECTION, SOLUTION INTRAVENOUS at 12:10

## 2019-10-29 RX ADMIN — Medication 500 UNITS: at 02:10

## 2019-10-29 RX ADMIN — APREPITANT 130 MG: 130 INJECTION, EMULSION INTRAVENOUS at 10:10

## 2019-10-29 RX ADMIN — SODIUM CHLORIDE: 0.9 INJECTION, SOLUTION INTRAVENOUS at 09:10

## 2019-10-29 RX ADMIN — DEXAMETHASONE SODIUM PHOSPHATE: 4 INJECTION, SOLUTION INTRA-ARTICULAR; INTRALESIONAL; INTRAMUSCULAR; INTRAVENOUS; SOFT TISSUE at 10:10

## 2019-10-29 RX ADMIN — DIPHENHYDRAMINE HYDROCHLORIDE 25 MG: 50 INJECTION INTRAMUSCULAR; INTRAVENOUS at 09:10

## 2019-10-29 RX ADMIN — DOXORUBICIN HYDROCHLORIDE 114 MG: 2 INJECTION, SOLUTION INTRAVENOUS at 11:10

## 2019-10-29 RX ADMIN — ONDANSETRON 16 MG: 2 INJECTION INTRAMUSCULAR; INTRAVENOUS at 09:10

## 2019-10-29 NOTE — PLAN OF CARE
Problem: Nausea and Vomiting  Goal: Fluid and Electrolyte Balance  Outcome: Ongoing, Not Progressing  Intervention: Prevent and Manage Nausea and Vomiting  Flowsheets (Taken 10/29/2019 0938)  Nausea/Vomiting Interventions: slow deep breathing encouraged; stimuli minimized  Environmental Support: environmental consistency promoted; calm environment promoted     Problem: Infection  Goal: Infection Symptom Resolution  Outcome: Ongoing, Not Progressing  Intervention: Prevent or Manage Infection  Flowsheets (Taken 10/29/2019 0938)  Infection Management: aseptic technique maintained

## 2019-10-30 ENCOUNTER — INFUSION (OUTPATIENT)
Dept: INFUSION THERAPY | Facility: HOSPITAL | Age: 39
End: 2019-10-30
Attending: INTERNAL MEDICINE
Payer: MEDICARE

## 2019-10-30 VITALS
HEART RATE: 93 BPM | SYSTOLIC BLOOD PRESSURE: 128 MMHG | TEMPERATURE: 98 F | RESPIRATION RATE: 18 BRPM | DIASTOLIC BLOOD PRESSURE: 81 MMHG

## 2019-10-30 DIAGNOSIS — Z17.1 MALIGNANT NEOPLASM OF OVERLAPPING SITES OF LEFT BREAST IN FEMALE, ESTROGEN RECEPTOR NEGATIVE: ICD-10-CM

## 2019-10-30 DIAGNOSIS — D70.1 CHEMOTHERAPY INDUCED NEUTROPENIA: Primary | ICD-10-CM

## 2019-10-30 DIAGNOSIS — C50.812 MALIGNANT NEOPLASM OF OVERLAPPING SITES OF LEFT BREAST IN FEMALE, ESTROGEN RECEPTOR NEGATIVE: ICD-10-CM

## 2019-10-30 DIAGNOSIS — T45.1X5A CHEMOTHERAPY INDUCED NEUTROPENIA: Primary | ICD-10-CM

## 2019-10-30 PROCEDURE — 63600175 PHARM REV CODE 636 W HCPCS: Performed by: INTERNAL MEDICINE

## 2019-10-30 PROCEDURE — 96372 THER/PROPH/DIAG INJ SC/IM: CPT | Mod: 59

## 2019-10-30 PROCEDURE — 96365 THER/PROPH/DIAG IV INF INIT: CPT

## 2019-10-30 PROCEDURE — 96367 TX/PROPH/DG ADDL SEQ IV INF: CPT

## 2019-10-30 RX ADMIN — ONDANSETRON 16 MG: 2 INJECTION INTRAMUSCULAR; INTRAVENOUS at 01:10

## 2019-10-30 RX ADMIN — DEXAMETHASONE SODIUM PHOSPHATE: 4 INJECTION, SOLUTION INTRA-ARTICULAR; INTRALESIONAL; INTRAMUSCULAR; INTRAVENOUS; SOFT TISSUE at 01:10

## 2019-10-30 RX ADMIN — PEGFILGRASTIM-CBQV 6 MG: 6 INJECTION, SOLUTION SUBCUTANEOUS at 01:10

## 2019-10-30 NOTE — PLAN OF CARE
Problem: Nausea and Vomiting  Goal: Fluid and Electrolyte Balance  Outcome: Ongoing, Not Progressing  Intervention: Prevent and Manage Nausea and Vomiting  Flowsheets (Taken 10/30/2019 1408)  Nausea/Vomiting Interventions: slow deep breathing encouraged  Environmental Support: calm environment promoted; other (see comments); environmental consistency promoted (medicated zofran today)

## 2019-11-08 ENCOUNTER — TELEPHONE (OUTPATIENT)
Dept: HEMATOLOGY/ONCOLOGY | Facility: CLINIC | Age: 39
End: 2019-11-08

## 2019-11-08 NOTE — TELEPHONE ENCOUNTER
Patient called stating she had a fever of 100.9F last night and she took Tylenol and Motrin she has not had any more fever today and she is currently on Cefuroxime 500 mg PO BID for a sinus infection. She has some sores in her nose which I told be she can use Bacitracin/neosporin on a qtip in the nose. Notified her if she develops any more fever over the weekend to call the on-call # or go to the ER.

## 2019-11-13 ENCOUNTER — OFFICE VISIT (OUTPATIENT)
Dept: HEMATOLOGY/ONCOLOGY | Facility: CLINIC | Age: 39
End: 2019-11-13
Payer: MEDICARE

## 2019-11-13 VITALS
OXYGEN SATURATION: 96 % | BODY MASS INDEX: 43.77 KG/M2 | WEIGHT: 255 LBS | SYSTOLIC BLOOD PRESSURE: 126 MMHG | DIASTOLIC BLOOD PRESSURE: 87 MMHG | TEMPERATURE: 98 F | HEART RATE: 91 BPM

## 2019-11-13 DIAGNOSIS — C50.812 MALIGNANT NEOPLASM OF OVERLAPPING SITES OF LEFT BREAST IN FEMALE, ESTROGEN RECEPTOR NEGATIVE: Primary | ICD-10-CM

## 2019-11-13 DIAGNOSIS — Z17.1 MALIGNANT NEOPLASM OF OVERLAPPING SITES OF LEFT BREAST IN FEMALE, ESTROGEN RECEPTOR NEGATIVE: Primary | ICD-10-CM

## 2019-11-13 PROCEDURE — 99213 PR OFFICE/OUTPT VISIT, EST, LEVL III, 20-29 MIN: ICD-10-PCS | Mod: S$GLB,,, | Performed by: INTERNAL MEDICINE

## 2019-11-13 PROCEDURE — 99213 OFFICE O/P EST LOW 20 MIN: CPT | Mod: S$GLB,,, | Performed by: INTERNAL MEDICINE

## 2019-11-13 RX ORDER — CEFUROXIME AXETIL 500 MG/1
500 TABLET ORAL 2 TIMES DAILY
Refills: 0 | COMMUNITY
Start: 2019-11-07 | End: 2019-12-12 | Stop reason: ALTCHOICE

## 2019-11-13 RX ORDER — DOXORUBICIN HYDROCHLORIDE 2 MG/ML
50 INJECTION, SOLUTION INTRAVENOUS
Status: CANCELLED | OUTPATIENT
Start: 2019-11-19

## 2019-11-13 RX ORDER — SODIUM CHLORIDE 0.9 % (FLUSH) 0.9 %
10 SYRINGE (ML) INJECTION
Status: CANCELLED | OUTPATIENT
Start: 2019-11-19

## 2019-11-13 RX ORDER — ACETAMINOPHEN 325 MG/1
650 TABLET ORAL ONCE
Status: CANCELLED
Start: 2019-11-19

## 2019-11-13 RX ORDER — HEPARIN 100 UNIT/ML
500 SYRINGE INTRAVENOUS
Status: CANCELLED | OUTPATIENT
Start: 2019-11-19

## 2019-11-13 RX ORDER — ALBUTEROL SULFATE 90 UG/1
2 AEROSOL, METERED RESPIRATORY (INHALATION) EVERY 6 HOURS PRN
COMMUNITY
Start: 2019-11-07 | End: 2021-03-17

## 2019-11-13 RX ORDER — FAMOTIDINE 10 MG/ML
20 INJECTION INTRAVENOUS
Status: CANCELLED
Start: 2019-11-19

## 2019-11-13 NOTE — PROGRESS NOTES
[facility] History & Physical    Subjective:      Patient ID:   Sergio Clifford  39 y.o. female  1980  ELROY Roche MD Vesanth Bethala, MD Cynthia Jean Pierre, MD      Chief Complaint:   L breast cancer.    HPI:  39 y.o. female palpated a L breast lump at 4 o'clock.  The mass is now smaller and less painful, alopecia beginning.  Bx [id number]  [facility]  Invasive ductal carcinoma, grade 3,  ERP-, PRP-, H2N-.  Mamm 2.8 cm mass.    Developing alopecia.  S/P #2 TAC.    C/O URI, bronchitis, yellow green phlegm.  On Cefuroxime 500 mg BID.    After #2;  GERD, diarrhea, mucositis x's 1-2 days, resolved.    She went to Dr. Gordo Guerrero's office, going through menopause after starting chemo.    Hx GERD, anxiety, MVP.     L breast lump bx at [facility], benign.   craniotomy for AVM removal,  shunt placed.  W.  Last MRI, ,  She is claustrophobic.  2012 L foot fx and repair.    M0  Menses onset 12, 1st child at 13.    Light smoker, none since .  ETOH socially.  Allergy PCN, rash.  Codeine, itch. Demeral, hallucinations.  Job in Sales.    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  Brother lymphangioma or mesenteric cyst.  Sister multiple sclerosis.  Sister HTN, obese, LBP.    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 walker    Breast cancer 2019    CVA (cerebral infarction)     Edema     Fractures     left foot    MVP (mitral valve prolapse)      Reflux     Wears glasses      Past Surgical History:   Procedure Laterality Date    BRAIN AVM REPAIR      BREAST BIOPSY  left    2002    CRANIOTOMY  2006    CVA tumor removal  2006    FOOT FRACTURE SURGERY      INSERTION OF CATHETER Right 9/30/2019    Procedure: INSERTION, CATHETER;  Surgeon: Caitlyn Elkins MD;  [facility];  Service: General;  Laterality: Right;    VENTRICULOPERITONEAL SHUNT  2006    MAGNETIC - NO MRI       Review of patient's allergies indicates:   Allergen Reactions    Codeine Itching    Demerol [meperidine] Other (See Comments)     hallucinations    Pcn [penicillins] Hives           Current Outpatient Medications:     betaxolol (KERLONE) 10 mg Tab, Take 5 mg by mouth once daily., Disp: , Rfl:     cefUROXime (CEFTIN) 500 MG tablet, Take 500 mg by mouth 2 (two) times daily., Disp: , Rfl: 0    esomeprazole (NEXIUM) 20 MG capsule, Take 40 mg by mouth before breakfast. , Disp: , Rfl:     HYDROcodone-acetaminophen (NORCO) 5-325 mg per tablet, Take 1 tablet by mouth 3 (three) times daily as needed for Pain., Disp: 15 tablet, Rfl: 0    hydrocodone-acetaminophen (VICODIN) 5-500 mg per tablet, , Disp: , Rfl:     levocetirizine (XYZAL) 5 MG tablet, Take 5 mg by mouth., Disp: , Rfl:     nitrofurantoin, macrocrystal-monohydrate, (MACROBID) 100 MG capsule, , Disp: , Rfl:     sertraline (ZOLOFT) 50 MG tablet, Take 50 mg by mouth every morning., Disp: , Rfl: 3    VENTOLIN HFA 90 mcg/actuation inhaler, , Disp: , Rfl:           Objective:   Vitals:  Blood pressure 126/87, pulse 91, temperature 97.9 °F (36.6 °C), weight 115.7 kg (255 lb), SpO2 96 %.    Physical Examination:   GEN: no apparent distress, comfortable  HEAD: atraumatic and normocephalic.  Alopecia beginning.  EYES: no pallor, no icterus  ENT:  no pharyngeal erythema, external ears WNL; no nasal discharge  NECK: no masses, thyroid normal, trachea midline, no LAD/LN's, supple  CV: RRR with no murmur; normal pulse; normal S1 and  S2; no pedal edema  CHEST: Normal respiratory effort; CTAB; normal breath sounds; no wheeze or crackles  ABDOM: nontender and nondistended; soft;  no rebound/guarding, L/S NP  MUSC/Skeletal: ROM normal; no crepitus; joints normal; no deformities   EXTREM: no clubbing, cyanosis, inflammation or swelling  SKIN: no rashes, lesions, ulcers, petechiae   : no cvat  NEURO: grossly intact; motor/sensory WNL;  no tremors,  Balance is off, ambulates with walker since her craniotomy.  PSYCH: normal mood, affect and behavior  LYMPH: normal cervical, supraclavicular, axillary and groin LN's  L Breast has hard nodule at 4 o'clock L breast, tender.  But decreasing size and tenderness since last weeks exam.  R Breast no palpable mass      Labs:   Lab Results   Component Value Date    WBC 7.35 09/27/2019    HGB 13.0 09/27/2019    HCT 40.8 09/27/2019    MCV 92 09/27/2019     09/27/2019        Assessment:   (1) 39 y.o. female with diagnosis of L lower outer Breast cancer, 2.8 cm, grade 3, triple negative.  Stage 2 dx clinically by primary size.    (2)Discussed the rationale of neoadjuvant chemotherapy x's 4-6 cycles, before surgery to decrease mass size, try to achieve CRISTIAN with chemo, before surgery.    She is leaning to bilateral mastectomy, and reconstruction, she does not want Rad Rx. And does not lean towards conserving the L breast.  She wants breast reduction.    Port placement Dr. Elkins.  2 D Echo for ejection fraction was NL at 60%.    Options AC, TAC, CTX-Taxotere.    Check BRCA 1 & 2.    S/P 2nd cycle TAC.  Clinically better.  Neutropenia 2nd chemo.  Call me if temp > 100.4 or chills,  May indicate infection.    D1C3 next week.  See me 3 weeks.

## 2019-11-19 ENCOUNTER — INFUSION (OUTPATIENT)
Dept: INFUSION THERAPY | Facility: HOSPITAL | Age: 39
End: 2019-11-19
Attending: INTERNAL MEDICINE
Payer: MEDICARE

## 2019-11-19 VITALS
RESPIRATION RATE: 20 BRPM | WEIGHT: 252.44 LBS | HEIGHT: 64 IN | SYSTOLIC BLOOD PRESSURE: 131 MMHG | HEART RATE: 84 BPM | TEMPERATURE: 98 F | DIASTOLIC BLOOD PRESSURE: 76 MMHG | OXYGEN SATURATION: 99 % | BODY MASS INDEX: 43.1 KG/M2

## 2019-11-19 DIAGNOSIS — T45.1X5A CHEMOTHERAPY INDUCED NEUTROPENIA: Primary | ICD-10-CM

## 2019-11-19 DIAGNOSIS — D70.1 CHEMOTHERAPY INDUCED NEUTROPENIA: Primary | ICD-10-CM

## 2019-11-19 DIAGNOSIS — C50.812 MALIGNANT NEOPLASM OF OVERLAPPING SITES OF LEFT BREAST IN FEMALE, ESTROGEN RECEPTOR NEGATIVE: ICD-10-CM

## 2019-11-19 DIAGNOSIS — Z17.1 MALIGNANT NEOPLASM OF OVERLAPPING SITES OF LEFT BREAST IN FEMALE, ESTROGEN RECEPTOR NEGATIVE: ICD-10-CM

## 2019-11-19 PROCEDURE — 96411 CHEMO IV PUSH ADDL DRUG: CPT

## 2019-11-19 PROCEDURE — 96375 TX/PRO/DX INJ NEW DRUG ADDON: CPT

## 2019-11-19 PROCEDURE — 63600175 PHARM REV CODE 636 W HCPCS: Mod: JG | Performed by: INTERNAL MEDICINE

## 2019-11-19 PROCEDURE — 96417 CHEMO IV INFUS EACH ADDL SEQ: CPT

## 2019-11-19 PROCEDURE — 96367 TX/PROPH/DG ADDL SEQ IV INF: CPT

## 2019-11-19 PROCEDURE — 96413 CHEMO IV INFUSION 1 HR: CPT

## 2019-11-19 PROCEDURE — 25000003 PHARM REV CODE 250: Performed by: INTERNAL MEDICINE

## 2019-11-19 PROCEDURE — S0028 INJECTION, FAMOTIDINE, 20 MG: HCPCS | Performed by: INTERNAL MEDICINE

## 2019-11-19 RX ORDER — ACETAMINOPHEN 325 MG/1
650 TABLET ORAL ONCE
Status: COMPLETED | OUTPATIENT
Start: 2019-11-19 | End: 2019-11-19

## 2019-11-19 RX ORDER — HEPARIN 100 UNIT/ML
500 SYRINGE INTRAVENOUS
Status: DISCONTINUED | OUTPATIENT
Start: 2019-11-19 | End: 2019-11-19 | Stop reason: HOSPADM

## 2019-11-19 RX ORDER — SODIUM CHLORIDE 0.9 % (FLUSH) 0.9 %
10 SYRINGE (ML) INJECTION
Status: DISCONTINUED | OUTPATIENT
Start: 2019-11-19 | End: 2019-11-19 | Stop reason: HOSPADM

## 2019-11-19 RX ORDER — FAMOTIDINE 10 MG/ML
20 INJECTION INTRAVENOUS
Status: COMPLETED | OUTPATIENT
Start: 2019-11-19 | End: 2019-11-19

## 2019-11-19 RX ORDER — DOXORUBICIN HYDROCHLORIDE 2 MG/ML
50 INJECTION, SOLUTION INTRAVENOUS
Status: COMPLETED | OUTPATIENT
Start: 2019-11-19 | End: 2019-11-19

## 2019-11-19 RX ADMIN — DOCETAXEL 170 MG: 10 INJECTION, SOLUTION INTRAVENOUS at 12:11

## 2019-11-19 RX ADMIN — HEPARIN 500 UNITS: 100 SYRINGE at 02:11

## 2019-11-19 RX ADMIN — DIPHENHYDRAMINE HYDROCHLORIDE 25 MG: 50 INJECTION INTRAMUSCULAR; INTRAVENOUS at 11:11

## 2019-11-19 RX ADMIN — ACETAMINOPHEN 650 MG: 325 TABLET ORAL at 10:11

## 2019-11-19 RX ADMIN — SODIUM CHLORIDE: 0.9 INJECTION, SOLUTION INTRAVENOUS at 10:11

## 2019-11-19 RX ADMIN — APREPITANT 130 MG: 130 INJECTION, EMULSION INTRAVENOUS at 11:11

## 2019-11-19 RX ADMIN — FAMOTIDINE 20 MG: 10 INJECTION, SOLUTION INTRAVENOUS at 11:11

## 2019-11-19 RX ADMIN — DEXAMETHASONE SODIUM PHOSPHATE: 4 INJECTION, SOLUTION INTRAMUSCULAR; INTRAVENOUS at 10:11

## 2019-11-19 RX ADMIN — ONDANSETRON 16 MG: 2 INJECTION INTRAMUSCULAR; INTRAVENOUS at 10:11

## 2019-11-19 RX ADMIN — DOXORUBICIN HYDROCHLORIDE 114 MG: 2 INJECTION, SOLUTION INTRAVENOUS at 01:11

## 2019-11-19 RX ADMIN — CYCLOPHOSPHAMIDE 1145 MG: 1 INJECTION, POWDER, FOR SOLUTION INTRAVENOUS; ORAL at 01:11

## 2019-11-20 ENCOUNTER — INFUSION (OUTPATIENT)
Dept: INFUSION THERAPY | Facility: HOSPITAL | Age: 39
End: 2019-11-20
Attending: INTERNAL MEDICINE
Payer: MEDICARE

## 2019-11-20 VITALS
HEIGHT: 64 IN | WEIGHT: 255.13 LBS | BODY MASS INDEX: 43.55 KG/M2 | HEART RATE: 85 BPM | TEMPERATURE: 99 F | DIASTOLIC BLOOD PRESSURE: 80 MMHG | RESPIRATION RATE: 20 BRPM | SYSTOLIC BLOOD PRESSURE: 115 MMHG

## 2019-11-20 DIAGNOSIS — Z17.1 MALIGNANT NEOPLASM OF OVERLAPPING SITES OF LEFT BREAST IN FEMALE, ESTROGEN RECEPTOR NEGATIVE: ICD-10-CM

## 2019-11-20 DIAGNOSIS — T45.1X5A CHEMOTHERAPY INDUCED NEUTROPENIA: Primary | ICD-10-CM

## 2019-11-20 DIAGNOSIS — D70.1 CHEMOTHERAPY INDUCED NEUTROPENIA: Primary | ICD-10-CM

## 2019-11-20 DIAGNOSIS — C50.812 MALIGNANT NEOPLASM OF OVERLAPPING SITES OF LEFT BREAST IN FEMALE, ESTROGEN RECEPTOR NEGATIVE: ICD-10-CM

## 2019-11-20 PROCEDURE — 96372 THER/PROPH/DIAG INJ SC/IM: CPT | Mod: 59

## 2019-11-20 PROCEDURE — 63600175 PHARM REV CODE 636 W HCPCS: Performed by: INTERNAL MEDICINE

## 2019-11-20 PROCEDURE — 96365 THER/PROPH/DIAG IV INF INIT: CPT

## 2019-11-20 PROCEDURE — 96367 TX/PROPH/DG ADDL SEQ IV INF: CPT

## 2019-11-20 RX ORDER — SODIUM CHLORIDE 0.9 % (FLUSH) 0.9 %
10 SYRINGE (ML) INJECTION
Status: DISCONTINUED | OUTPATIENT
Start: 2019-11-20 | End: 2019-11-20 | Stop reason: HOSPADM

## 2019-11-20 RX ORDER — SODIUM CHLORIDE 0.9 % (FLUSH) 0.9 %
10 SYRINGE (ML) INJECTION
Status: CANCELLED | OUTPATIENT
Start: 2019-11-20

## 2019-11-20 RX ORDER — HEPARIN 100 UNIT/ML
500 SYRINGE INTRAVENOUS
Status: COMPLETED | OUTPATIENT
Start: 2019-11-20 | End: 2019-11-20

## 2019-11-20 RX ORDER — HEPARIN 100 UNIT/ML
500 SYRINGE INTRAVENOUS
Status: CANCELLED | OUTPATIENT
Start: 2019-11-20

## 2019-11-20 RX ADMIN — PEGFILGRASTIM-CBQV 6 MG: 6 INJECTION, SOLUTION SUBCUTANEOUS at 02:11

## 2019-11-20 RX ADMIN — DEXAMETHASONE SODIUM PHOSPHATE: 4 INJECTION, SOLUTION INTRAMUSCULAR; INTRAVENOUS at 02:11

## 2019-11-20 RX ADMIN — ONDANSETRON 16 MG: 2 INJECTION INTRAMUSCULAR; INTRAVENOUS at 02:11

## 2019-11-20 RX ADMIN — HEPARIN 500 UNITS: 100 SYRINGE at 03:11

## 2019-11-20 NOTE — PLAN OF CARE
Problem: Fatigue  Goal: Improved Activity Tolerance  Outcome: Ongoing, Progressing  Intervention: Promote Energy Conservation  Flowsheets (Taken 11/20/2019 1440)  Fatigue Management: frequent rest breaks encouraged  Sleep/Rest Enhancement: regular sleep/rest pattern promoted  Activity Management: ambulated - L4; activity encouraged

## 2019-12-05 ENCOUNTER — OFFICE VISIT (OUTPATIENT)
Dept: HEMATOLOGY/ONCOLOGY | Facility: CLINIC | Age: 39
End: 2019-12-05
Payer: MEDICARE

## 2019-12-05 VITALS
TEMPERATURE: 99 F | RESPIRATION RATE: 18 BRPM | DIASTOLIC BLOOD PRESSURE: 63 MMHG | SYSTOLIC BLOOD PRESSURE: 111 MMHG | WEIGHT: 259 LBS | BODY MASS INDEX: 44.46 KG/M2 | HEART RATE: 93 BPM

## 2019-12-05 DIAGNOSIS — C50.812 MALIGNANT NEOPLASM OF OVERLAPPING SITES OF LEFT BREAST IN FEMALE, ESTROGEN RECEPTOR NEGATIVE: Primary | ICD-10-CM

## 2019-12-05 DIAGNOSIS — Z17.1 MALIGNANT NEOPLASM OF OVERLAPPING SITES OF LEFT BREAST IN FEMALE, ESTROGEN RECEPTOR NEGATIVE: Primary | ICD-10-CM

## 2019-12-05 PROCEDURE — 99213 PR OFFICE/OUTPT VISIT, EST, LEVL III, 20-29 MIN: ICD-10-PCS | Mod: S$GLB,,, | Performed by: INTERNAL MEDICINE

## 2019-12-05 PROCEDURE — 99213 OFFICE O/P EST LOW 20 MIN: CPT | Mod: S$GLB,,, | Performed by: INTERNAL MEDICINE

## 2019-12-07 DIAGNOSIS — Z17.1 MALIGNANT NEOPLASM OF OVERLAPPING SITES OF LEFT BREAST IN FEMALE, ESTROGEN RECEPTOR NEGATIVE: Primary | ICD-10-CM

## 2019-12-07 DIAGNOSIS — C50.812 MALIGNANT NEOPLASM OF OVERLAPPING SITES OF LEFT BREAST IN FEMALE, ESTROGEN RECEPTOR NEGATIVE: Primary | ICD-10-CM

## 2019-12-07 NOTE — PROGRESS NOTES
[facility] History & Physical    Subjective:      Patient ID:   Sergio Clifford  39 y.o. female  1980  ELROY Roche MD Vesanth Bethala, MD Cynthia Jean Pierre, MD      Chief Complaint:   L breast cancer.    HPI:  39 y.o. female palpated a L breast lump at 4 o'clock.  The mass is now smaller and less painful, alopecia beginning.  Bx [id number]  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  Mamm 2.8 cm mass.    Developing alopecia.  S/P #2 TAC.    C/O URI, bronchitis, yellow green phlegm.  On Cefuroxime 500 mg BID.    After #2;  GERD, diarrhea, mucositis x's 1-2 days, resolved.    She went to Dr. Gordo Guerrero's office, going through menopause after starting chemo.    Hx GERD, anxiety, MVP.     L breast lump bx at [facility], [facility], benign.   craniotomy for AVM removal,  shunt placed.  W.  Last MRI, ,  She is claustrophobic.  2012 L foot fx and repair.    M0  Menses onset 12, 1st child at 13.    Light smoker, none since .  ETOH socially.  Allergy PCN, rash.  Codeine, itch. Demeral, hallucinations.  Job in Sales.    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  Brother lymphangioma or mesenteric cyst.  Sister multiple sclerosis.  Sister HTN, obese, LBP.    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 walker    Breast cancer 2019    CVA (cerebral infarction)     Edema     Fractures     left foot    MVP (mitral valve prolapse)      Reflux     Wears glasses      Past Surgical History:   Procedure Laterality Date    BRAIN AVM REPAIR      BREAST BIOPSY  left    2002    CRANIOTOMY  2006    CVA tumor removal  2006    FOOT FRACTURE SURGERY      INSERTION OF CATHETER Right 9/30/2019    Procedure: INSERTION, CATHETER;  Surgeon: Caitlyn Elkins MD;  Location: [facility];  Service: General;  Laterality: Right;    VENTRICULOPERITONEAL SHUNT  2006    MAGNETIC - NO MRI       Review of patient's allergies indicates:   Allergen Reactions    Codeine Itching    Demerol [meperidine] Other (See Comments)     hallucinations    Pcn [penicillins] Hives           Current Outpatient Medications:     betaxolol (KERLONE) 10 mg Tab, Take 5 mg by mouth once daily., Disp: , Rfl:     cefUROXime (CEFTIN) 500 MG tablet, Take 500 mg by mouth 2 (two) times daily., Disp: , Rfl: 0    esomeprazole (NEXIUM) 20 MG capsule, Take 40 mg by mouth before breakfast. , Disp: , Rfl:     HYDROcodone-acetaminophen (NORCO) 5-325 mg per tablet, Take 1 tablet by mouth 3 (three) times daily as needed for Pain., Disp: 15 tablet, Rfl: 0    hydrocodone-acetaminophen (VICODIN) 5-500 mg per tablet, , Disp: , Rfl:     levocetirizine (XYZAL) 5 MG tablet, Take 5 mg by mouth., Disp: , Rfl:     nitrofurantoin, macrocrystal-monohydrate, (MACROBID) 100 MG capsule, , Disp: , Rfl:     sertraline (ZOLOFT) 50 MG tablet, Take 50 mg by mouth every morning., Disp: , Rfl: 3    VENTOLIN HFA 90 mcg/actuation inhaler, , Disp: , Rfl:           Objective:   Vitals:  Blood pressure 111/63, pulse 93, temperature 99 °F (37.2 °C), temperature source Oral, resp. rate 18, weight 117.5 kg (259 lb).    Physical Examination:   GEN: no apparent distress, comfortable  HEAD: atraumatic and normocephalic.  Alopecia beginning.  EYES: no pallor, no icterus  ENT:  no pharyngeal erythema, external ears WNL; no nasal discharge  NECK: no masses, thyroid normal, trachea midline, no LAD/LN's, supple  CV: RRR with no murmur;  normal pulse; normal S1 and S2; no pedal edema  CHEST: Normal respiratory effort; CTAB; normal breath sounds; no wheeze or crackles  ABDOM: nontender and nondistended; soft;  no rebound/guarding, L/S NP  MUSC/Skeletal: ROM normal; no crepitus; joints normal; no deformities   EXTREM: no clubbing, cyanosis, inflammation or swelling  SKIN: no rashes, lesions, ulcers, petechiae   : no cvat  NEURO: grossly intact; motor/sensory WNL;  no tremors,  Balance is off, ambulates with walker since her craniotomy.  PSYCH: normal mood, affect and behavior  LYMPH: normal cervical, supraclavicular, axillary and groin LN's  L Breast has nodule at 4 o'clock L breast, non tender.  But decreasing size and tenderness since last weeks exam.  R Breast no palpable mass      Labs:   Lab Results   Component Value Date    WBC 7.35 09/27/2019    HGB 13.0 09/27/2019    HCT 40.8 09/27/2019    MCV 92 09/27/2019     09/27/2019        Assessment:   (1) 39 y.o. female with diagnosis of L lower outer Breast cancer, 2.8 cm, grade 3, triple negative.  Stage 2 dx clinically by primary size.    (2)Discussed the rationale of neoadjuvant chemotherapy x's 4-6 cycles, before surgery to decrease mass size, try to achieve CRISTIAN with chemo, before surgery.    She is leaning to bilateral mastectomy, and reconstruction, she does not want Rad Rx. And does not lean towards conserving the L breast.  She wants breast reduction.    Port placement Dr. Elkins.  2 D Echo for ejection fraction was NL at 60%.    Options AC, TAC, CTX-Taxotere.    Check BRCA 1 & 2.    S/P 3rd cycle TAC.  Clinically better.  Neutropenia 2nd chemo.  Call me if temp > 100.4 or chills,  May indicate infection.    D1C4 next week.  Check Mamm, U/S 12/30.  ECHO 12/30/19.  See me 1/2/20.

## 2019-12-08 RX ORDER — SODIUM CHLORIDE 0.9 % (FLUSH) 0.9 %
10 SYRINGE (ML) INJECTION
Status: CANCELLED | OUTPATIENT
Start: 2019-12-10

## 2019-12-08 RX ORDER — FAMOTIDINE 10 MG/ML
20 INJECTION INTRAVENOUS
Status: CANCELLED
Start: 2019-12-10

## 2019-12-08 RX ORDER — DOXORUBICIN HYDROCHLORIDE 2 MG/ML
50 INJECTION, SOLUTION INTRAVENOUS
Status: CANCELLED | OUTPATIENT
Start: 2019-12-10

## 2019-12-08 RX ORDER — HEPARIN 100 UNIT/ML
500 SYRINGE INTRAVENOUS
Status: CANCELLED | OUTPATIENT
Start: 2019-12-10

## 2019-12-08 RX ORDER — ACETAMINOPHEN 325 MG/1
650 TABLET ORAL ONCE
Status: CANCELLED
Start: 2019-12-10

## 2019-12-10 ENCOUNTER — INFUSION (OUTPATIENT)
Dept: INFUSION THERAPY | Facility: HOSPITAL | Age: 39
End: 2019-12-10
Attending: INTERNAL MEDICINE
Payer: MEDICARE

## 2019-12-10 ENCOUNTER — OFFICE VISIT (OUTPATIENT)
Dept: HEMATOLOGY/ONCOLOGY | Facility: CLINIC | Age: 39
End: 2019-12-10
Payer: MEDICARE

## 2019-12-10 ENCOUNTER — TELEPHONE (OUTPATIENT)
Dept: HEMATOLOGY/ONCOLOGY | Facility: CLINIC | Age: 39
End: 2019-12-10

## 2019-12-10 VITALS
BODY MASS INDEX: 44.73 KG/M2 | DIASTOLIC BLOOD PRESSURE: 81 MMHG | SYSTOLIC BLOOD PRESSURE: 127 MMHG | HEIGHT: 64 IN | BODY MASS INDEX: 44.5 KG/M2 | WEIGHT: 260.56 LBS | TEMPERATURE: 98 F | DIASTOLIC BLOOD PRESSURE: 81 MMHG | HEART RATE: 103 BPM | HEART RATE: 103 BPM | TEMPERATURE: 98 F | RESPIRATION RATE: 18 BRPM | OXYGEN SATURATION: 99 % | RESPIRATION RATE: 18 BRPM | WEIGHT: 260.63 LBS | SYSTOLIC BLOOD PRESSURE: 127 MMHG

## 2019-12-10 DIAGNOSIS — Z17.1 MALIGNANT NEOPLASM OF OVERLAPPING SITES OF LEFT BREAST IN FEMALE, ESTROGEN RECEPTOR NEGATIVE: Primary | ICD-10-CM

## 2019-12-10 DIAGNOSIS — C50.812 MALIGNANT NEOPLASM OF OVERLAPPING SITES OF LEFT BREAST IN FEMALE, ESTROGEN RECEPTOR NEGATIVE: Primary | ICD-10-CM

## 2019-12-10 PROCEDURE — 99213 PR OFFICE/OUTPT VISIT, EST, LEVL III, 20-29 MIN: ICD-10-PCS | Mod: S$GLB,,, | Performed by: INTERNAL MEDICINE

## 2019-12-10 PROCEDURE — 99213 OFFICE O/P EST LOW 20 MIN: CPT | Mod: S$GLB,,, | Performed by: INTERNAL MEDICINE

## 2019-12-11 NOTE — PROGRESS NOTES
No c/o.  In good spirits.  Due for D1C4 of chemo today.    The port a catheter has flipped over.    I spoke with Dr. Elkins.  He will see her today.    He is to re position the port on Friday.  Will continue with D1 C4 on Monday.  12/16/19.    Re evaluate the breast cancer therafter.  See me 1/2020.

## 2019-12-12 ENCOUNTER — HOSPITAL ENCOUNTER (OUTPATIENT)
Dept: RADIOLOGY | Facility: HOSPITAL | Age: 39
Discharge: HOME | End: 2019-12-12
Attending: SURGERY
Payer: MEDICARE

## 2019-12-12 ENCOUNTER — HOSPITAL ENCOUNTER (OUTPATIENT)
Dept: PREADMISSION TESTING | Facility: HOSPITAL | Age: 39
Discharge: HOME | End: 2019-12-12
Attending: SURGERY
Payer: MEDICARE

## 2019-12-12 VITALS
DIASTOLIC BLOOD PRESSURE: 67 MMHG | SYSTOLIC BLOOD PRESSURE: 107 MMHG | WEIGHT: 256.19 LBS | RESPIRATION RATE: 18 BRPM | BODY MASS INDEX: 43.74 KG/M2 | TEMPERATURE: 98 F | OXYGEN SATURATION: 100 % | HEIGHT: 64 IN | HEART RATE: 74 BPM

## 2019-12-12 DIAGNOSIS — C50.512 MALIGNANT NEOPLASM OF LOWER-OUTER QUADRANT OF LEFT FEMALE BREAST: ICD-10-CM

## 2019-12-12 DIAGNOSIS — C50.512 MALIGNANT NEOPLASM OF LOWER-OUTER QUADRANT OF LEFT FEMALE BREAST: Primary | ICD-10-CM

## 2019-12-12 DIAGNOSIS — Z01.818 PRE-OP TESTING: Primary | ICD-10-CM

## 2019-12-12 RX ORDER — FLUTICASONE PROPIONATE 50 MCG
2 SPRAY, SUSPENSION (ML) NASAL DAILY
COMMUNITY
End: 2022-12-27

## 2019-12-12 RX ORDER — LEVOFLOXACIN 5 MG/ML
500 INJECTION, SOLUTION INTRAVENOUS ONCE
Status: CANCELLED | OUTPATIENT
Start: 2019-12-13

## 2019-12-12 RX ORDER — DEXAMETHASONE 4 MG/1
4 TABLET ORAL EVERY 12 HOURS
COMMUNITY
End: 2021-03-17

## 2019-12-12 RX ORDER — CHOLECALCIFEROL (VITAMIN D3) 25 MCG
1000 TABLET ORAL DAILY
COMMUNITY
End: 2021-03-17

## 2019-12-12 RX ORDER — PHYTONADIONE 5 MG/1
5 TABLET ORAL ONCE
COMMUNITY
End: 2021-03-17

## 2019-12-12 RX ORDER — PLANT STANOL ESTER 450 MG
8000 TABLET ORAL DAILY
COMMUNITY
End: 2021-03-17

## 2019-12-12 NOTE — DISCHARGE INSTRUCTIONS
To confirm, Your doctor has instructed you that surgery is scheduled for:   December 13, 2019 Friday    Pre-Op will call the afternoon prior to surgery between 4:00 and 6:00 PM with the final arrival time.      Please report to [facility] on [address] the morning of surgery.     Do not eat or drink anything after midnight the night before your surgery - THIS INCLUDES  WATER, GUM, MINTS AND CANDY.  YOU MAY BRUSH YOUR TEETH BUT DO NOT SWALLOW     TAKE ONLY THESE MEDICATIONS WITH A SMALL SIP OF WATER THE MORNING OF YOUR PROCEDURE:  Betaxolol         PLEASE NOTE:  The surgery schedule has many variables which may affect the time of your surgery case.  Family members should be available if your surgery time changes.  Plan to be here the day of your procedure between 4-6 hours.      DO NOT TAKE THESE MEDICATIONS 5-7 DAYS PRIOR to your procedure or per your surgeon's request: ASPIRIN, ALEVE, ADVIL, IBUPROFEN,  LEANDRA SELTZER, BC , FISH OIL , VITAMIN E, HERBALS  (May take Tylenol)                                                           IMPORTANT INSTRUCTIONS      · Do not smoke, vape or drink alcoholic beverages 24 hours prior to your procedure.  · Shower the night before AND the morning of your procedure with a Chlorhexidine wash such as Hibiclens or Dial antibacterial soap from the neck down.   ·  Do not get it on your face or in your eyes.  You may use your own shampoo and face wash. This helps your skin to be as bacteria free as possible.   ·  DO NOT remove hair from the surgery site.  Do not shave the incision site unless you are given specific instructions to do so.    · Sleep in a bed with clean sheets.  Do not sleep with a pet in the bed.   · If you wear contact lenses, dentures, hearing aids or glasses, bring a container to put them in during surgery and give to a family member for safe keeping.    · Please leave all jewelry, piercing's and valuables at home.       · Make arrangements in advance for  transportation home by a responsible adult.      · You must make arrangements for transportation, TAXI'S, UBER'S OR LYFTS ARE NOT ALLOWED.        If you have any questions about these instructions, call Pre-Op Admit  Nursing at [phone] or the Pre-Op Day Surgery Unit at [phone].

## 2019-12-13 ENCOUNTER — HOSPITAL ENCOUNTER (OUTPATIENT)
Facility: HOSPITAL | Age: 39
Discharge: HOME | End: 2019-12-13
Attending: SURGERY | Admitting: SURGERY
Payer: MEDICARE

## 2019-12-13 ENCOUNTER — HOSPITAL ENCOUNTER (OUTPATIENT)
Dept: RADIOLOGY | Facility: HOSPITAL | Age: 39
Discharge: HOME | End: 2019-12-13
Attending: SURGERY | Admitting: SURGERY
Payer: MEDICARE

## 2019-12-13 ENCOUNTER — TELEPHONE (OUTPATIENT)
Dept: HEMATOLOGY/ONCOLOGY | Facility: CLINIC | Age: 39
End: 2019-12-13

## 2019-12-13 ENCOUNTER — ANESTHESIA EVENT (OUTPATIENT)
Dept: SURGERY | Facility: HOSPITAL | Age: 39
End: 2019-12-13
Payer: MEDICARE

## 2019-12-13 ENCOUNTER — ANESTHESIA (OUTPATIENT)
Dept: SURGERY | Facility: HOSPITAL | Age: 39
End: 2019-12-13
Payer: MEDICARE

## 2019-12-13 VITALS
HEART RATE: 63 BPM | TEMPERATURE: 98 F | DIASTOLIC BLOOD PRESSURE: 82 MMHG | SYSTOLIC BLOOD PRESSURE: 136 MMHG | OXYGEN SATURATION: 99 % | RESPIRATION RATE: 16 BRPM

## 2019-12-13 DIAGNOSIS — Z17.1 MALIGNANT NEOPLASM OF OVERLAPPING SITES OF LEFT BREAST IN FEMALE, ESTROGEN RECEPTOR NEGATIVE: Primary | ICD-10-CM

## 2019-12-13 DIAGNOSIS — C50.812 MALIGNANT NEOPLASM OF OVERLAPPING SITES OF LEFT BREAST IN FEMALE, ESTROGEN RECEPTOR NEGATIVE: Primary | ICD-10-CM

## 2019-12-13 DIAGNOSIS — Z01.818 PRE-OP TESTING: ICD-10-CM

## 2019-12-13 DIAGNOSIS — Z45.2 ENCOUNTER FOR CARE RELATED TO VASCULAR ACCESS PORT: ICD-10-CM

## 2019-12-13 LAB
B-HCG UR QL: NEGATIVE
CTP QC/QA: YES

## 2019-12-13 PROCEDURE — 27201423 OPTIME MED/SURG SUP & DEVICES STERILE SUPPLY: Performed by: SURGERY

## 2019-12-13 PROCEDURE — 87206 SMEAR FLUORESCENT/ACID STAI: CPT

## 2019-12-13 PROCEDURE — 63600175 PHARM REV CODE 636 W HCPCS: Performed by: SURGERY

## 2019-12-13 PROCEDURE — 27000080 OPTIME MED/SURG SUP & DEVICES GENERAL CLASSIFICATION: Performed by: SURGERY

## 2019-12-13 PROCEDURE — 25000003 PHARM REV CODE 250: Performed by: ANESTHESIOLOGY

## 2019-12-13 PROCEDURE — 27000673 HC TUBING BLOOD Y: Performed by: ANESTHESIOLOGY

## 2019-12-13 PROCEDURE — 27000284 HC CANNULA NASAL: Performed by: ANESTHESIOLOGY

## 2019-12-13 PROCEDURE — 63600175 PHARM REV CODE 636 W HCPCS: Performed by: NURSE ANESTHETIST, CERTIFIED REGISTERED

## 2019-12-13 PROCEDURE — 37000009 HC ANESTHESIA EA ADD 15 MINS: Performed by: SURGERY

## 2019-12-13 PROCEDURE — 36000707: Performed by: SURGERY

## 2019-12-13 PROCEDURE — 87102 FUNGUS ISOLATION CULTURE: CPT

## 2019-12-13 PROCEDURE — 87118 MYCOBACTERIC IDENTIFICATION: CPT

## 2019-12-13 PROCEDURE — 27000654 HC CATH IV JELCO: Performed by: ANESTHESIOLOGY

## 2019-12-13 PROCEDURE — 37000008 HC ANESTHESIA 1ST 15 MINUTES: Performed by: SURGERY

## 2019-12-13 PROCEDURE — 87116 MYCOBACTERIA CULTURE: CPT

## 2019-12-13 PROCEDURE — 36000706: Performed by: SURGERY

## 2019-12-13 PROCEDURE — 71000015 HC POSTOP RECOV 1ST HR: Performed by: SURGERY

## 2019-12-13 PROCEDURE — S0028 INJECTION, FAMOTIDINE, 20 MG: HCPCS | Performed by: NURSE ANESTHETIST, CERTIFIED REGISTERED

## 2019-12-13 PROCEDURE — 63600175 PHARM REV CODE 636 W HCPCS: Performed by: ANESTHESIOLOGY

## 2019-12-13 PROCEDURE — 81025 URINE PREGNANCY TEST: CPT | Performed by: ANESTHESIOLOGY

## 2019-12-13 PROCEDURE — 87205 SMEAR GRAM STAIN: CPT

## 2019-12-13 PROCEDURE — 87075 CULTR BACTERIA EXCEPT BLOOD: CPT

## 2019-12-13 PROCEDURE — 25000003 PHARM REV CODE 250: Performed by: NURSE ANESTHETIST, CERTIFIED REGISTERED

## 2019-12-13 PROCEDURE — 87070 CULTURE OTHR SPECIMN AEROBIC: CPT

## 2019-12-13 PROCEDURE — 25000003 PHARM REV CODE 250: Performed by: SURGERY

## 2019-12-13 RX ORDER — HEPARIN SODIUM 1000 [USP'U]/ML
INJECTION, SOLUTION INTRAVENOUS; SUBCUTANEOUS
Status: DISCONTINUED | OUTPATIENT
Start: 2019-12-13 | End: 2019-12-13 | Stop reason: HOSPADM

## 2019-12-13 RX ORDER — OXYCODONE AND ACETAMINOPHEN 5; 325 MG/1; MG/1
1 TABLET ORAL EVERY 8 HOURS PRN
Qty: 12 TABLET | Refills: 0 | Status: SHIPPED | OUTPATIENT
Start: 2019-12-13 | End: 2021-03-17

## 2019-12-13 RX ORDER — ACETAMINOPHEN 500 MG
1000 TABLET ORAL
Status: COMPLETED | OUTPATIENT
Start: 2019-12-13 | End: 2019-12-13

## 2019-12-13 RX ORDER — OXYCODONE AND ACETAMINOPHEN 5; 325 MG/1; MG/1
1 TABLET ORAL EVERY 8 HOURS PRN
Qty: 12 TABLET | Refills: 0
Start: 2019-12-13 | End: 2021-03-17

## 2019-12-13 RX ORDER — HYDROCODONE BITARTRATE AND ACETAMINOPHEN 5; 325 MG/1; MG/1
1 TABLET ORAL EVERY 6 HOURS PRN
Status: CANCELLED | OUTPATIENT
Start: 2019-12-13

## 2019-12-13 RX ORDER — BUPIVACAINE HYDROCHLORIDE AND EPINEPHRINE 5; 5 MG/ML; UG/ML
INJECTION, SOLUTION EPIDURAL; INTRACAUDAL; PERINEURAL
Status: DISCONTINUED | OUTPATIENT
Start: 2019-12-13 | End: 2019-12-13 | Stop reason: HOSPADM

## 2019-12-13 RX ORDER — DEXAMETHASONE SODIUM PHOSPHATE 4 MG/ML
INJECTION, SOLUTION INTRA-ARTICULAR; INTRALESIONAL; INTRAMUSCULAR; INTRAVENOUS; SOFT TISSUE
Status: DISCONTINUED | OUTPATIENT
Start: 2019-12-13 | End: 2019-12-13

## 2019-12-13 RX ORDER — DIPHENHYDRAMINE HYDROCHLORIDE 50 MG/ML
12.5 INJECTION INTRAMUSCULAR; INTRAVENOUS
Status: DISCONTINUED | OUTPATIENT
Start: 2019-12-13 | End: 2019-12-13 | Stop reason: HOSPADM

## 2019-12-13 RX ORDER — FAMOTIDINE 10 MG/ML
INJECTION INTRAVENOUS
Status: DISCONTINUED | OUTPATIENT
Start: 2019-12-13 | End: 2019-12-13

## 2019-12-13 RX ORDER — ONDANSETRON 2 MG/ML
4 INJECTION INTRAMUSCULAR; INTRAVENOUS DAILY PRN
Status: DISCONTINUED | OUTPATIENT
Start: 2019-12-13 | End: 2019-12-13 | Stop reason: HOSPADM

## 2019-12-13 RX ORDER — SODIUM CHLORIDE 0.9 G/100ML
IRRIGANT IRRIGATION
Status: DISCONTINUED | OUTPATIENT
Start: 2019-12-13 | End: 2019-12-13 | Stop reason: HOSPADM

## 2019-12-13 RX ORDER — ACETAMINOPHEN 325 MG/1
650 TABLET ORAL EVERY 4 HOURS PRN
Status: CANCELLED | OUTPATIENT
Start: 2019-12-13

## 2019-12-13 RX ORDER — SODIUM CHLORIDE, SODIUM LACTATE, POTASSIUM CHLORIDE, CALCIUM CHLORIDE 600; 310; 30; 20 MG/100ML; MG/100ML; MG/100ML; MG/100ML
INJECTION, SOLUTION INTRAVENOUS CONTINUOUS PRN
Status: DISCONTINUED | OUTPATIENT
Start: 2019-12-13 | End: 2019-12-13

## 2019-12-13 RX ORDER — TRAMADOL HYDROCHLORIDE 50 MG/1
50 TABLET ORAL ONCE AS NEEDED
Status: DISCONTINUED | OUTPATIENT
Start: 2019-12-13 | End: 2019-12-13 | Stop reason: HOSPADM

## 2019-12-13 RX ORDER — LEVOFLOXACIN 5 MG/ML
500 INJECTION, SOLUTION INTRAVENOUS ONCE
Status: COMPLETED | OUTPATIENT
Start: 2019-12-13 | End: 2019-12-13

## 2019-12-13 RX ORDER — FENTANYL CITRATE 50 UG/ML
25 INJECTION, SOLUTION INTRAMUSCULAR; INTRAVENOUS
Status: DISCONTINUED | OUTPATIENT
Start: 2019-12-13 | End: 2019-12-13 | Stop reason: HOSPADM

## 2019-12-13 RX ORDER — SODIUM CHLORIDE 0.9 % (FLUSH) 0.9 %
10 SYRINGE (ML) INJECTION
Status: DISCONTINUED | OUTPATIENT
Start: 2019-12-13 | End: 2019-12-13 | Stop reason: HOSPADM

## 2019-12-13 RX ORDER — ONDANSETRON 2 MG/ML
4 INJECTION INTRAMUSCULAR; INTRAVENOUS EVERY 12 HOURS PRN
Status: CANCELLED | OUTPATIENT
Start: 2019-12-13

## 2019-12-13 RX ORDER — FENTANYL CITRATE 50 UG/ML
INJECTION, SOLUTION INTRAMUSCULAR; INTRAVENOUS
Status: DISCONTINUED | OUTPATIENT
Start: 2019-12-13 | End: 2019-12-13

## 2019-12-13 RX ORDER — ONDANSETRON 4 MG/1
4 TABLET, ORALLY DISINTEGRATING ORAL ONCE AS NEEDED
Status: COMPLETED | OUTPATIENT
Start: 2019-12-13 | End: 2019-12-13

## 2019-12-13 RX ORDER — OXYCODONE HYDROCHLORIDE 5 MG/1
5 TABLET ORAL
Status: DISCONTINUED | OUTPATIENT
Start: 2019-12-13 | End: 2019-12-13 | Stop reason: HOSPADM

## 2019-12-13 RX ORDER — MIDAZOLAM HYDROCHLORIDE 1 MG/ML
INJECTION INTRAMUSCULAR; INTRAVENOUS
Status: DISCONTINUED | OUTPATIENT
Start: 2019-12-13 | End: 2019-12-13

## 2019-12-13 RX ADMIN — FENTANYL CITRATE 100 MCG: 50 INJECTION INTRAMUSCULAR; INTRAVENOUS at 08:12

## 2019-12-13 RX ADMIN — ACETAMINOPHEN 1000 MG: 500 TABLET, FILM COATED ORAL at 08:12

## 2019-12-13 RX ADMIN — ONDANSETRON 4 MG: 4 TABLET, ORALLY DISINTEGRATING ORAL at 09:12

## 2019-12-13 RX ADMIN — SODIUM CHLORIDE, SODIUM LACTATE, POTASSIUM CHLORIDE, AND CALCIUM CHLORIDE: .6; .31; .03; .02 INJECTION, SOLUTION INTRAVENOUS at 08:12

## 2019-12-13 RX ADMIN — OXYCODONE HYDROCHLORIDE 5 MG: 5 TABLET ORAL at 10:12

## 2019-12-13 RX ADMIN — MIDAZOLAM HYDROCHLORIDE 2 MG: 1 INJECTION, SOLUTION INTRAMUSCULAR; INTRAVENOUS at 08:12

## 2019-12-13 RX ADMIN — FAMOTIDINE 20 MG: 10 INJECTION, SOLUTION INTRAVENOUS at 09:12

## 2019-12-13 RX ADMIN — DEXAMETHASONE SODIUM PHOSPHATE 8 MG: 4 INJECTION, SOLUTION INTRAMUSCULAR; INTRAVENOUS at 09:12

## 2019-12-13 RX ADMIN — DIPHENHYDRAMINE HYDROCHLORIDE 6.25 MG: 50 INJECTION, SOLUTION INTRAMUSCULAR; INTRAVENOUS at 09:12

## 2019-12-13 RX ADMIN — LEVOFLOXACIN 500 MG: 500 INJECTION, SOLUTION INTRAVENOUS at 08:12

## 2019-12-13 NOTE — ANESTHESIA PREPROCEDURE EVALUATION
12/13/2019  Sergio Clifford is a 39 y.o., female.    Patient Active Problem List   Diagnosis    Nondisplaced fracture of fourth metatarsal bone, left foot, initial encounter for closed fracture    Nondisplaced fracture of third metatarsal bone, left foot, initial encounter for closed fracture    Malignant neoplasm of overlapping sites of breast in female, estrogen receptor negative    Chemotherapy induced neutropenia       Past Surgical History:   Procedure Laterality Date    BRAIN AVM REPAIR  2006    BREAST BIOPSY  left    2002    CRANIOTOMY  2006    CVA tumor removal  2006    FOOT FRACTURE SURGERY      INSERTION OF CATHETER Right 9/30/2019    Procedure: INSERTION, CATHETER;  Surgeon: Caitlyn Elkins MD;  Location: [facility] OR;  Service: General;  Laterality: Right;    VENTRICULOPERITONEAL SHUNT  2006    MAGNETIC - NO MRI          Results for orders placed or performed during the hospital encounter of 09/27/19   EKG 12-lead    Collection Time: 09/27/19  9:20 AM    Narrative    Test Reason : Z01.818,    Vent. Rate : 073 BPM     Atrial Rate : 073 BPM     P-R Int : 150 ms          QRS Dur : 074 ms      QT Int : 386 ms       P-R-T Axes : 032 044 036 degrees     QTc Int : 425 ms    Normal sinus rhythm  Normal ECG  When compared with ECG of 16-OCT-2012 13:48,  No significant change was found  Confirmed by Alba TAN, Annmarie ([id number]) on 9/27/2019 7:55:21 PM    Referred By: СВЕТЛАНА ELDER           Confirmed By:Annmarie Willis MD             Lab Results   Component Value Date    WBC 12.25 12/12/2019    HGB 12.0 12/12/2019    HCT 37.1 12/12/2019    MCV 96 12/12/2019     12/12/2019     BMP  Lab Results   Component Value Date     12/12/2019    K 3.6 12/12/2019     12/12/2019    CO2 29 12/12/2019    BUN 14 12/12/2019    CREATININE 0.6 12/12/2019    CALCIUM 9.5 12/12/2019    ANIONGAP 8  12/12/2019    ESTGFRAFRICA >60.0 12/12/2019    EGFRNONAA >60.0 12/12/2019             Pre-op Assessment    I have reviewed the Patient Summary Reports.     I have reviewed the Nursing Notes.   I have reviewed the Medications.   Decadron    Review of Systems  Anesthesia Hx:  No problems with previous Anesthesia Denies Hx of Anesthetic complications  History of prior surgery of interest to airway management or planning: Previous anesthesia: General Denies Family Hx of Anesthesia complications.   Denies Personal Hx of Anesthesia complications.   Social:  Former Smoker, Alcohol Use    Hematology/Oncology:        Current/Recent Cancer. Breast left no axillary node dissection no lymphedema chemotherapy and surgery   EENT/Dental:EENT/Dental Normal   Cardiovascular:   Valvular problems/Murmurs, MVP ECG has been reviewed. Followed by Dr. Deluna seen 6+ mo. Ago no change sin medicines or Tx.   Pulmonary:   Recent URI, resolved Bronchitis 2 mo. Ago without fever now   Residual non-productive cough at this time   Renal/:  Renal/ Normal     Hepatic/GI:   GERD, well controlled    Musculoskeletal:  Musculoskeletal Normal    Neurological:   CVA (h/o AVM, CVA 2006, s/p  shunt, uses walker for balance, good muscle strength), residual symptoms AVM s/p crainotomy   Patient with residual balance gait disturbance   Patient uses wqalker   Endocrine:  Endocrine Normal    Dermatological:  Skin Normal    Psych:   anxiety Claustrophobia         Physical Exam  General:  Well nourished, Obesity    Airway/Jaw/Neck:  Airway Findings: Mouth Opening: Normal Tongue: Normal  General Airway Assessment: Adult  Mallampati: III  Improves to III with phonation.  TM Distance: Normal, at least 6 cm  Jaw/Neck Findings:  Neck ROM: Normal ROM      Dental:  Dental Findings: (perm. lower bridge) In tact    Chest/Lungs:  Chest/Lungs Findings: Clear to auscultation, Normal Respiratory Rate     Heart/Vascular:  Heart Findings: Rate: Normal  Rhythm: Regular  Rhythm  Sounds: Normal     Abdomen:  Abdomen Findings: Normal    Musculoskeletal:  Musculoskeletal Findings: Normal   Skin:  Skin Findings: Normal    Mental Status:  Mental Status Findings:  Cooperative, Alert and Oriented         Anesthesia Plan  Type of Anesthesia, risks & benefits discussed:  Anesthesia Type:  MAC  Patient's Preference:   Intra-op Monitoring Plan: standard ASA monitors  Intra-op Monitoring Plan Comments:   Post Op Pain Control Plan: per primary service following discharge from PACU  Post Op Pain Control Plan Comments:   Induction:   IV  Beta Blocker:  Patient is on a Beta-Blocker and has received one dose within the past 24 hours (No further documentation required).       Informed Consent: Patient understands risks and agrees with Anesthesia plan.  Questions answered. Anesthesia consent signed with patient.  ASA Score: 3     Day of Surgery Review of History & Physical: I have interviewed and examined the patient. I have reviewed the patient's H&P dated:  There are no significant changes.      Anesthesia Plan Notes: MAC   Benadryl 6.25 mg iv  Decadron 8 mg iv   Zofran 4mg iv  Pepcid 20mg iv   Tylenol 1000 mg po   _________________

## 2019-12-13 NOTE — DISCHARGE INSTRUCTIONS
For 24 hours  Do not shower  Do not sign any legal documents  Do not drink alcohol  No driving  Discharge Instructions: After Your Surgery  Youve just had surgery. During surgery, you were given medicine called anesthesia to keep you relaxed and free of pain. After surgery, you may have some pain or nausea. This is common. Here are some tips for feeling better and getting well after surgery.     Stay on schedule with your medicine.   Going home  Your healthcare provider will show you how to take care of yourself when you go home. He or she will also answer your questions. Have an adult family member or friend drive you home. For the first 24 hours after your surgery:  · Do not drive or use heavy equipment.  · Do not make important decisions or sign legal papers.  · Do not drink alcohol.  · Have someone stay with you, if needed. He or she can watch for problems and help keep you safe.  Be sure to go to all follow-up visits with your healthcare provider. And rest after your surgery for as long as your healthcare provider tells you to.  Coping with pain  If you have pain after surgery, pain medicine will help you feel better. Take it as told, before pain becomes severe. Also, ask your healthcare provider or pharmacist about other ways to control pain. This might be with heat, ice, or relaxation. And follow any other instructions your surgeon or nurse gives you.  Tips for taking pain medicine  To get the best relief possible, remember these points:  · Pain medicines can upset your stomach. Taking them with a little food may help.  · Most pain relievers taken by mouth need at least 20 to 30 minutes to start to work.  · Taking medicine on a schedule can help you remember to take it. Try to time your medicine so that you can take it before starting an activity. This might be before you get dressed, go for a walk, or sit down for dinner.  · Constipation is a common side effect of pain medicines. Call your healthcare  provider before taking any medicines such as laxatives or stool softeners to help ease constipation. Also ask if you should skip any foods. Drinking lots of fluids and eating foods such as fruits and vegetables that are high in fiber can also help. Remember, do not take laxatives unless your surgeon has prescribed them.  · Drinking alcohol and taking pain medicine can cause dizziness and slow your breathing. It can even be deadly. Do not drink alcohol while taking pain medicine.  · Pain medicine can make you react more slowly to things. Do not drive or run machinery while taking pain medicine.  Your healthcare provider may tell you to take acetaminophen to help ease your pain. Ask him or her how much you are supposed to take each day. Acetaminophen or other pain relievers may interact with your prescription medicines or other over-the-counter (OTC) medicines. Some prescription medicines have acetaminophen and other ingredients. Using both prescription and OTC acetaminophen for pain can cause you to overdose. Read the labels on your OTC medicines with care. This will help you to clearly know the list of ingredients, how much to take, and any warnings. It may also help you not take too much acetaminophen. If you have questions or do not understand the information, ask your pharmacist or healthcare provider to explain it to you before you take the OTC medicine.  Managing nausea  Some people have an upset stomach after surgery. This is often because of anesthesia, pain, or pain medicine, or the stress of surgery. These tips will help you handle nausea and eat healthy foods as you get better. If you were on a special food plan before surgery, ask your healthcare provider if you should follow it while you get better. These tips may help:  · Do not push yourself to eat. Your body will tell you when to eat and how much.  · Start off with clear liquids and soup. They are easier to digest.  · Next try semi-solid foods, such  as mashed potatoes, applesauce, and gelatin, as you feel ready.  · Slowly move to solid foods. Dont eat fatty, rich, or spicy foods at first.  · Do not force yourself to have 3 large meals a day. Instead eat smaller amounts more often.  · Take pain medicines with a small amount of solid food, such as crackers or toast, to avoid nausea.     Call your surgeon if  · You still have pain an hour after taking medicine. The medicine may not be strong enough.  · You feel too sleepy, dizzy, or groggy. The medicine may be too strong.  · You have side effects like nausea, vomiting, or skin changes, such as rash, itching, or hives.       If you have obstructive sleep apnea  You were given anesthesia medicine during surgery to keep you comfortable and free of pain. After surgery, you may have more apnea spells because of this medicine and other medicines you were given. The spells may last longer than usual.   At home:  · Keep using the continuous positive airway pressure (CPAP) device when you sleep. Unless your healthcare provider tells you not to, use it when you sleep, day or night. CPAP is a common device used to treat obstructive sleep apnea.  · Talk with your provider before taking any pain medicine, muscle relaxants, or sedatives. Your provider will tell you about the possible dangers of taking these medicines.  Date Last Reviewed: 12/1/2016  © 9459-6502 [employer]. [address] [place] [address], [place] [address]. All rights reserved. This information is not intended as a substitute for professional medical care. Always follow your healthcare professional's instructions.      You may remove your dressing in 72 hours and take a shower. You do not have to replace the dressing after that time.

## 2019-12-13 NOTE — ANESTHESIA POSTPROCEDURE EVALUATION
Anesthesia Post Evaluation    Patient: Sergio Clifford    Procedure(s) Performed: Procedure(s) (LRB):  PORT-A-CATH EXPLORATION (N/A)    Final Anesthesia Type: MAC    Patient location during evaluation: OPS  Patient participation: Yes- Able to Participate  Level of consciousness: awake and alert, oriented and awake  Post-procedure vital signs: reviewed and stable  Pain management: adequate  Airway patency: patent    PONV status at discharge: No PONV  Anesthetic complications: no      Cardiovascular status: blood pressure returned to baseline, hemodynamically stable and stable  Respiratory status: unassisted, spontaneous ventilation and room air  Hydration status: euvolemic  Follow-up not needed.          Vitals Value Taken Time   /94 12/13/2019  7:08 AM   Temp 36.9 °C (98.5 °F) 12/13/2019  7:03 AM   Pulse 66 12/13/2019  7:03 AM   Resp 16 12/13/2019  7:03 AM   SpO2 98 % 12/13/2019  7:03 AM         No case tracking events are documented in the log.      Pain/Corazon Score: Pain Rating Prior to Med Admin: 0 (12/13/2019  8:31 AM)

## 2019-12-13 NOTE — TELEPHONE ENCOUNTER
----- Message from Cesia Cannon sent at 12/12/2019 11:43 AM CST -----  Contact: patient  Patient has been having blood in stools. Please call    [phone]

## 2019-12-13 NOTE — OP NOTE
Preop diagnosis:  Flipped port    Postop diagnosis:  Same  Procedure Is wound exploration with radial orientation of  Port  Patient is placed supine on operating table satisfactory sedation.  The right chest was prepped and draped in sterile fashion.  I used 1% xylocaine with epinephrine for local anesthesia.  Previous incision was reopened.  Sutures holding the port in place had pulled through in the port was flipped.  It was repositioned to appropriate position.  It was sewn in at 3 spots with interrupted with interrupted permanent suture port was accessed and flushed easily but did not withdraw blood.  Wound was closed in layers with absorbable suture. She tolerated procedure well

## 2019-12-13 NOTE — TRANSFER OF CARE
Anesthesia Transfer of Care Note    Patient: Sergio Clifford    Procedure(s) Performed: Procedure(s) (LRB):  PORT-A-CATH EXPLORATION (N/A)    Patient location: [facility]    Anesthesia Type: MAC    Transport from OR: Transported from OR on room air with adequate spontaneous ventilation    Post pain: adequate analgesia    Post assessment: no apparent anesthetic complications    Post vital signs: stable    Level of consciousness: awake    Nausea/Vomiting: no nausea/vomiting    Complications: none    Transfer of care protocol was followed      Last vitals:   Visit Vitals  BP (!) 122/94   Pulse 66   Temp 36.9 °C (98.5 °F) (Oral)   Resp 16   LMP 11/20/2019   SpO2 98%   Breastfeeding? No

## 2019-12-14 LAB
GRAM STN SPEC: NORMAL
GRAM STN SPEC: NORMAL

## 2019-12-15 ENCOUNTER — HOSPITAL ENCOUNTER (EMERGENCY)
Facility: HOSPITAL | Age: 39
Discharge: HOME | End: 2019-12-15
Attending: EMERGENCY MEDICINE
Payer: MEDICARE

## 2019-12-15 VITALS
SYSTOLIC BLOOD PRESSURE: 131 MMHG | OXYGEN SATURATION: 100 % | DIASTOLIC BLOOD PRESSURE: 82 MMHG | BODY MASS INDEX: 43.36 KG/M2 | WEIGHT: 254 LBS | HEIGHT: 64 IN | RESPIRATION RATE: 16 BRPM | TEMPERATURE: 99 F | HEART RATE: 70 BPM

## 2019-12-15 DIAGNOSIS — K62.5 RECTAL BLEEDING: Primary | ICD-10-CM

## 2019-12-15 DIAGNOSIS — K59.00 CONSTIPATION, UNSPECIFIED CONSTIPATION TYPE: ICD-10-CM

## 2019-12-15 LAB
BACTERIA SPEC AEROBE CULT: NORMAL
BACTERIA SPEC ANAEROBE CULT: NORMAL
BASOPHILS # BLD AUTO: 0.02 K/UL (ref 0–0.2)
BASOPHILS NFR BLD: 0.1 % (ref 0–1.9)
DIFFERENTIAL METHOD: ABNORMAL
EOSINOPHIL # BLD AUTO: 0 K/UL (ref 0–0.5)
EOSINOPHIL NFR BLD: 0 % (ref 0–8)
ERYTHROCYTE [DISTWIDTH] IN BLOOD BY AUTOMATED COUNT: 17.9 % (ref 11.5–14.5)
HCT VFR BLD AUTO: 38.2 % (ref 37–48.5)
HGB BLD-MCNC: 12.3 G/DL (ref 12–16)
IMM GRANULOCYTES # BLD AUTO: 0.11 K/UL (ref 0–0.04)
IMM GRANULOCYTES NFR BLD AUTO: 0.8 % (ref 0–0.5)
LYMPHOCYTES # BLD AUTO: 1.4 K/UL (ref 1–4.8)
LYMPHOCYTES NFR BLD: 9.9 % (ref 18–48)
MCH RBC QN AUTO: 31.1 PG (ref 27–31)
MCHC RBC AUTO-ENTMCNC: 32.2 G/DL (ref 32–36)
MCV RBC AUTO: 97 FL (ref 82–98)
MONOCYTES # BLD AUTO: 1.3 K/UL (ref 0.3–1)
MONOCYTES NFR BLD: 9.2 % (ref 4–15)
NEUTROPHILS # BLD AUTO: 10.9 K/UL (ref 1.8–7.7)
NEUTROPHILS NFR BLD: 80 % (ref 38–73)
NRBC BLD-RTO: 0 /100 WBC
OB PNL STL: POSITIVE
PLATELET # BLD AUTO: 366 K/UL (ref 150–350)
PMV BLD AUTO: 8.9 FL (ref 9.2–12.9)
RBC # BLD AUTO: 3.96 M/UL (ref 4–5.4)
WBC # BLD AUTO: 13.68 K/UL (ref 3.9–12.7)

## 2019-12-15 PROCEDURE — 85025 COMPLETE CBC W/AUTO DIFF WBC: CPT

## 2019-12-15 PROCEDURE — 99283 EMERGENCY DEPT VISIT LOW MDM: CPT

## 2019-12-15 PROCEDURE — 82272 OCCULT BLD FECES 1-3 TESTS: CPT

## 2019-12-15 RX ORDER — POLYETHYLENE GLYCOL 3350 17 G/17G
17 POWDER, FOR SOLUTION ORAL DAILY
Qty: 30 EACH | Refills: 0 | Status: SHIPPED | OUTPATIENT
Start: 2019-12-15 | End: 2020-01-14

## 2019-12-15 RX ORDER — ONDANSETRON HYDROCHLORIDE 8 MG/1
8 TABLET, FILM COATED ORAL
COMMUNITY
End: 2021-03-17

## 2019-12-15 RX ORDER — PROMETHAZINE HYDROCHLORIDE 25 MG/1
25 TABLET ORAL EVERY 6 HOURS PRN
COMMUNITY
End: 2021-03-17

## 2019-12-15 NOTE — DISCHARGE INSTRUCTIONS
YOUR HEMOGLOBIN TODAY WAS 12 WHICH IS AT YOUR BASELINE. PLEASE TAKE THE MIRALAX TO HELP WITH YOUR HARD STOOLS. IF YOU DEVELOP PAIN OR PERSISTENT BLEEDING PLEASE COME BACK TO THE ER FOR PROMPT CARE.

## 2019-12-15 NOTE — ED PROVIDER NOTES
Encounter Date: 12/15/2019       History     Chief Complaint   Patient presents with    Rectal Bleeding     X 2 WEEKS     HPI   39-year-old female past medical history significant for breast cancer currently undergoing chemotherapy presents to the ER with rectal bleeding.  Patient states she noted specks of blood in her stool approximately 2 weeks ago and then yesterday she noted bright red blood around her stool after having a hard BM.  Patient denies lightheadedness, shortness of breath, chest pain, rectal pain, abdominal pain, fevers, chills, nausea or vomiting. Patient denies blood thinner use.  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    Breast cancer 2019    Edema     Fractures 2012    left foot    MVP (mitral valve prolapse)     Reflux     Wears glasses      Past Surgical History:   Procedure Laterality Date    BRAIN AVM REPAIR  2006    BREAST BIOPSY  left    2002    CRANIOTOMY  2006    CVA tumor removal  2006    FOOT FRACTURE SURGERY      INSERTION OF CATHETER Right 2019    Procedure: INSERTION, CATHETER;  Surgeon: Caitlyn Elkins MD;  Location: [facility];  Service: General;  Laterality: Right;    VENTRICULOPERITONEAL SHUNT  2006    MAGNETIC - NO MRI     Family History   Problem Relation Age of Onset    Diabetes Neg Hx      Social History     Tobacco Use    Smoking status: Former Smoker     Packs/day: 0.25     Years: 4.00     Pack years: 1.00     Types: Cigarettes     Last attempt to quit: 2012     Years since quittin.6    Smokeless tobacco: Never Used   Substance Use Topics    Alcohol use: Yes     Comment: social    Drug use: No     Review  of Systems   Constitutional: Negative for appetite change, chills, diaphoresis and fever.   HENT: Negative for ear pain, rhinorrhea, sore throat, trouble swallowing and voice change.    Eyes: Negative for pain, discharge, redness and visual disturbance.   Respiratory: Negative for cough, chest tightness and shortness of breath.    Cardiovascular: Negative for chest pain and leg swelling.   Gastrointestinal: Positive for blood in stool. Negative for abdominal pain, constipation, diarrhea, nausea, rectal pain and vomiting.   Genitourinary: Negative for difficulty urinating, dysuria, flank pain, frequency and urgency.   Musculoskeletal: Negative for arthralgias, back pain and myalgias.   Skin: Negative for rash.   Neurological: Negative for dizziness, syncope, speech difficulty, weakness, light-headedness, numbness and headaches.       Physical Exam     Initial Vitals [12/15/19 1207]   BP Pulse Resp Temp SpO2   131/67 81 18 98.7 °F (37.1 °C) 98 %      MAP       --         Physical Exam    Nursing note and vitals reviewed.  Constitutional: Vital signs are normal. She appears well-developed and well-nourished. She is not diaphoretic. She is cooperative.  Non-toxic appearance. She does not have a sickly appearance. She does not appear ill. No distress.   HENT:   Head: Normocephalic and atraumatic. Head is without abrasion, without right periorbital erythema and without left periorbital erythema.   Right Ear: Hearing and external ear normal. No drainage or tenderness.   Left Ear: Hearing and external ear normal. No drainage or tenderness.   Nose: No rhinorrhea, sinus tenderness or nasal septal hematoma. No epistaxis.  No foreign bodies. Right sinus exhibits no frontal sinus tenderness. Left sinus exhibits no frontal sinus tenderness.   Mouth/Throat: Uvula is midline and mucous membranes are normal. No oral lesions. No trismus in the jaw. No dental abscesses or uvula swelling. No oropharyngeal exudate, posterior  oropharyngeal edema, posterior oropharyngeal erythema or tonsillar abscesses.   Eyes: Lids are normal. Pupils are equal, round, and reactive to light. Right eye exhibits no chemosis, no discharge and no exudate. Left eye exhibits no chemosis, no discharge and no exudate. Right conjunctiva is not injected. Right conjunctiva has no hemorrhage. Left conjunctiva is not injected. Left conjunctiva has no hemorrhage. No scleral icterus. Right eye exhibits normal extraocular motion. Left eye exhibits normal extraocular motion.   Neck: Trachea normal and normal range of motion. Neck supple. No stridor present. No spinous process tenderness and no muscular tenderness present. No tracheal deviation and no edema present. No neck rigidity. No JVD present.   Cardiovascular: Regular rhythm, normal heart sounds and normal pulses.   Pulmonary/Chest: Breath sounds normal.       Abdominal: Soft. Bowel sounds are normal. She exhibits no distension, no ascites and no mass. There is no hepatosplenomegaly. There is no tenderness. There is no rigidity, no rebound, no guarding and no CVA tenderness. Hernia confirmed negative in the ventral area.   Genitourinary:   Genitourinary Comments: No external hemorrhoids noted. No mass or lesion noted on rectal exam   Musculoskeletal: Normal range of motion.   Lymphadenopathy:     She has no cervical adenopathy.   Neurological: She is alert. She has normal strength. No cranial nerve deficit or sensory deficit.   Skin: Skin is warm. Capillary refill takes less than 2 seconds. No ecchymosis, no lesion and no rash noted. No erythema.   Psychiatric: She has a normal mood and affect. Her speech is normal and behavior is normal. Judgment and thought content normal.         ED Course   Procedures  Labs Reviewed   OCCULT BLOOD X 1, STOOL - Abnormal; Notable for the following components:       Result Value    Occult Blood Positive (*)     All other components within normal limits   CBC W/ AUTO DIFFERENTIAL -  Abnormal; Notable for the following components:    WBC 13.68 (*)     RBC 3.96 (*)     Mean Corpuscular Hemoglobin 31.1 (*)     RDW 17.9 (*)     Platelets 366 (*)     MPV 8.9 (*)     Immature Granulocytes 0.8 (*)     Gran # (ANC) 10.9 (*)     Immature Grans (Abs) 0.11 (*)     Mono # 1.3 (*)     Gran% 80.0 (*)     Lymph% 9.9 (*)     All other components within normal limits          Imaging Results    None          Medical Decision Making:   ED Management:  Bleeding most likely secondary to hard bowel movements.  Will provide patient MiraLax and encouraged close PCP follow-up.  Patient's hemoglobin is at her baseline.                   ED Course as of Dec 15 1559   Sun Dec 15, 2019   1506 Occult blood test not returned.  Patient is hemodynamically stable. Patient provided strict return precautions.    [MP]      ED Course User Index  [MP] Aquiles Negrete MD                Clinical Impression:       ICD-10-CM ICD-9-CM   1. Rectal bleeding K62.5 569.3   2. Constipation, unspecified constipation type K59.00 564.00                             Aquiles Negrete MD  12/15/19 7401

## 2019-12-18 ENCOUNTER — INFUSION (OUTPATIENT)
Dept: INFUSION THERAPY | Facility: HOSPITAL | Age: 39
End: 2019-12-18
Attending: INTERNAL MEDICINE
Payer: MEDICARE

## 2019-12-18 VITALS
HEART RATE: 83 BPM | BODY MASS INDEX: 43.72 KG/M2 | WEIGHT: 256.06 LBS | RESPIRATION RATE: 18 BRPM | DIASTOLIC BLOOD PRESSURE: 62 MMHG | HEIGHT: 64 IN | SYSTOLIC BLOOD PRESSURE: 115 MMHG | TEMPERATURE: 98 F

## 2019-12-18 DIAGNOSIS — Z17.1 MALIGNANT NEOPLASM OF OVERLAPPING SITES OF LEFT BREAST IN FEMALE, ESTROGEN RECEPTOR NEGATIVE: ICD-10-CM

## 2019-12-18 DIAGNOSIS — C50.812 MALIGNANT NEOPLASM OF OVERLAPPING SITES OF LEFT BREAST IN FEMALE, ESTROGEN RECEPTOR NEGATIVE: ICD-10-CM

## 2019-12-18 DIAGNOSIS — D70.1 CHEMOTHERAPY INDUCED NEUTROPENIA: Primary | ICD-10-CM

## 2019-12-18 DIAGNOSIS — T45.1X5A CHEMOTHERAPY INDUCED NEUTROPENIA: Primary | ICD-10-CM

## 2019-12-18 PROCEDURE — 96375 TX/PRO/DX INJ NEW DRUG ADDON: CPT

## 2019-12-18 PROCEDURE — 96411 CHEMO IV PUSH ADDL DRUG: CPT

## 2019-12-18 PROCEDURE — 96417 CHEMO IV INFUS EACH ADDL SEQ: CPT

## 2019-12-18 PROCEDURE — A4216 STERILE WATER/SALINE, 10 ML: HCPCS | Performed by: INTERNAL MEDICINE

## 2019-12-18 PROCEDURE — 96413 CHEMO IV INFUSION 1 HR: CPT

## 2019-12-18 PROCEDURE — 63600175 PHARM REV CODE 636 W HCPCS: Mod: TB | Performed by: INTERNAL MEDICINE

## 2019-12-18 PROCEDURE — 25000003 PHARM REV CODE 250: Performed by: INTERNAL MEDICINE

## 2019-12-18 PROCEDURE — 96367 TX/PROPH/DG ADDL SEQ IV INF: CPT

## 2019-12-18 PROCEDURE — S0028 INJECTION, FAMOTIDINE, 20 MG: HCPCS | Performed by: INTERNAL MEDICINE

## 2019-12-18 RX ORDER — HEPARIN 100 UNIT/ML
500 SYRINGE INTRAVENOUS
Status: DISCONTINUED | OUTPATIENT
Start: 2019-12-18 | End: 2019-12-18 | Stop reason: HOSPADM

## 2019-12-18 RX ORDER — ACETAMINOPHEN 325 MG/1
650 TABLET ORAL ONCE
Status: COMPLETED | OUTPATIENT
Start: 2019-12-18 | End: 2019-12-18

## 2019-12-18 RX ORDER — SODIUM CHLORIDE 0.9 % (FLUSH) 0.9 %
10 SYRINGE (ML) INJECTION
Status: DISCONTINUED | OUTPATIENT
Start: 2019-12-18 | End: 2019-12-18 | Stop reason: HOSPADM

## 2019-12-18 RX ORDER — DOXORUBICIN HYDROCHLORIDE 2 MG/ML
50 INJECTION, SOLUTION INTRAVENOUS
Status: COMPLETED | OUTPATIENT
Start: 2019-12-18 | End: 2019-12-18

## 2019-12-18 RX ORDER — FAMOTIDINE 10 MG/ML
20 INJECTION INTRAVENOUS
Status: COMPLETED | OUTPATIENT
Start: 2019-12-18 | End: 2019-12-18

## 2019-12-18 RX ADMIN — DOXORUBICIN HYDROCHLORIDE 114 MG: 2 INJECTION, SOLUTION INTRAVENOUS at 12:12

## 2019-12-18 RX ADMIN — SODIUM CHLORIDE 1145 MG: 9 INJECTION, SOLUTION INTRAVENOUS at 12:12

## 2019-12-18 RX ADMIN — ACETAMINOPHEN 650 MG: 325 TABLET ORAL at 09:12

## 2019-12-18 RX ADMIN — SODIUM CHLORIDE 170 MG: 9 INJECTION, SOLUTION INTRAVENOUS at 11:12

## 2019-12-18 RX ADMIN — APREPITANT 130 MG: 130 INJECTION, EMULSION INTRAVENOUS at 10:12

## 2019-12-18 RX ADMIN — ONDANSETRON 16 MG: 2 INJECTION INTRAMUSCULAR; INTRAVENOUS at 09:12

## 2019-12-18 RX ADMIN — DIPHENHYDRAMINE HYDROCHLORIDE 25 MG: 50 INJECTION INTRAMUSCULAR; INTRAVENOUS at 10:12

## 2019-12-18 RX ADMIN — SODIUM CHLORIDE, PRESERVATIVE FREE 10 ML: 5 INJECTION INTRAVENOUS at 01:12

## 2019-12-18 RX ADMIN — FAMOTIDINE 20 MG: 10 INJECTION, SOLUTION INTRAVENOUS at 09:12

## 2019-12-18 RX ADMIN — HEPARIN 500 UNITS: 100 SYRINGE at 01:12

## 2019-12-18 RX ADMIN — DEXAMETHASONE SODIUM PHOSPHATE: 4 INJECTION, SOLUTION INTRAMUSCULAR; INTRAVENOUS at 09:12

## 2019-12-19 ENCOUNTER — INFUSION (OUTPATIENT)
Dept: INFUSION THERAPY | Facility: HOSPITAL | Age: 39
End: 2019-12-19
Attending: INTERNAL MEDICINE
Payer: MEDICARE

## 2019-12-19 VITALS
WEIGHT: 255.88 LBS | RESPIRATION RATE: 18 BRPM | TEMPERATURE: 98 F | HEIGHT: 64 IN | DIASTOLIC BLOOD PRESSURE: 81 MMHG | HEART RATE: 76 BPM | SYSTOLIC BLOOD PRESSURE: 136 MMHG | BODY MASS INDEX: 43.69 KG/M2

## 2019-12-19 DIAGNOSIS — C50.812 MALIGNANT NEOPLASM OF OVERLAPPING SITES OF LEFT BREAST IN FEMALE, ESTROGEN RECEPTOR NEGATIVE: ICD-10-CM

## 2019-12-19 DIAGNOSIS — T45.1X5A CHEMOTHERAPY INDUCED NEUTROPENIA: Primary | ICD-10-CM

## 2019-12-19 DIAGNOSIS — Z17.1 MALIGNANT NEOPLASM OF OVERLAPPING SITES OF LEFT BREAST IN FEMALE, ESTROGEN RECEPTOR NEGATIVE: ICD-10-CM

## 2019-12-19 DIAGNOSIS — D70.1 CHEMOTHERAPY INDUCED NEUTROPENIA: Primary | ICD-10-CM

## 2019-12-19 PROCEDURE — 96367 TX/PROPH/DG ADDL SEQ IV INF: CPT

## 2019-12-19 PROCEDURE — A4216 STERILE WATER/SALINE, 10 ML: HCPCS | Performed by: INTERNAL MEDICINE

## 2019-12-19 PROCEDURE — 63600175 PHARM REV CODE 636 W HCPCS: Performed by: INTERNAL MEDICINE

## 2019-12-19 PROCEDURE — 25000003 PHARM REV CODE 250: Performed by: INTERNAL MEDICINE

## 2019-12-19 PROCEDURE — 96372 THER/PROPH/DIAG INJ SC/IM: CPT | Mod: 59

## 2019-12-19 PROCEDURE — 96365 THER/PROPH/DIAG IV INF INIT: CPT

## 2019-12-19 RX ORDER — HEPARIN 100 UNIT/ML
500 SYRINGE INTRAVENOUS
Status: CANCELLED | OUTPATIENT
Start: 2019-12-19

## 2019-12-19 RX ORDER — SODIUM CHLORIDE 0.9 % (FLUSH) 0.9 %
10 SYRINGE (ML) INJECTION
Status: COMPLETED | OUTPATIENT
Start: 2019-12-19 | End: 2019-12-19

## 2019-12-19 RX ORDER — HEPARIN 100 UNIT/ML
500 SYRINGE INTRAVENOUS
Status: COMPLETED | OUTPATIENT
Start: 2019-12-19 | End: 2019-12-19

## 2019-12-19 RX ORDER — SODIUM CHLORIDE 0.9 % (FLUSH) 0.9 %
10 SYRINGE (ML) INJECTION
Status: CANCELLED | OUTPATIENT
Start: 2019-12-19

## 2019-12-19 RX ADMIN — HEPARIN 500 UNITS: 100 SYRINGE at 12:12

## 2019-12-19 RX ADMIN — ONDANSETRON 16 MG: 2 INJECTION INTRAMUSCULAR; INTRAVENOUS at 10:12

## 2019-12-19 RX ADMIN — SODIUM CHLORIDE, PRESERVATIVE FREE 10 ML: 5 INJECTION INTRAVENOUS at 12:12

## 2019-12-19 RX ADMIN — PEGFILGRASTIM-CBQV 6 MG: 6 INJECTION, SOLUTION SUBCUTANEOUS at 11:12

## 2019-12-19 RX ADMIN — DEXAMETHASONE SODIUM PHOSPHATE: 4 INJECTION, SOLUTION INTRA-ARTICULAR; INTRALESIONAL; INTRAMUSCULAR; INTRAVENOUS; SOFT TISSUE at 11:12

## 2019-12-26 ENCOUNTER — TELEPHONE (OUTPATIENT)
Dept: HEMATOLOGY/ONCOLOGY | Facility: CLINIC | Age: 39
End: 2019-12-26

## 2019-12-26 NOTE — TELEPHONE ENCOUNTER
----- Message from Vaishnavi Mckinley MA sent at 12/26/2019 11:29 AM CST -----  Asking if there is something that he can give other than the 40 mg of nexium for the acid reflux.     Its really bad at night.    Pt also mentioned that she has a ongoing cold, runny nose, and etc. Told the pt to contact her PCP or urgent care as she has already had a round of antibiotics as this could be something else.

## 2019-12-26 NOTE — TELEPHONE ENCOUNTER
Spoke to ELROY Becerril.  She recommends pt to try pepsid otc.  Discontinue nexium at this time.  Also instructed pt to not eat 2 hours before bed. Avoid greasy, acidic food.  Also elevate HOB.

## 2019-12-27 ENCOUNTER — TELEPHONE (OUTPATIENT)
Dept: HEMATOLOGY/ONCOLOGY | Facility: CLINIC | Age: 39
End: 2019-12-27

## 2019-12-27 DIAGNOSIS — Z17.1 ESTROGEN RECEPTOR NEGATIVE STATUS (ER-): ICD-10-CM

## 2019-12-27 DIAGNOSIS — C50.812 MALIGNANT NEOPLASM OF OVERLAPPING SITES OF LEFT FEMALE BREAST: Primary | ICD-10-CM

## 2019-12-27 NOTE — TELEPHONE ENCOUNTER
----- Message from Juli Franklin sent at 12/26/2019  3:59 PM CST -----  The patient called and said that Dr Rivera ordered some tests for her to have done at [place] and they never called her to schedule. She said she called today and that they cannot do the mammogram until 1/16 and her appointment is on 1/2. She wants to know if you can change the orders to [facility] so she can get it done before her appointment. Please call her back at [phone].        Spoke to patient and let her know that it is ok to have mammo done at [facility]. I transferred her to their scheduling dept. Per her request.Orders are in Epic system.

## 2019-12-30 ENCOUNTER — TELEPHONE (OUTPATIENT)
Dept: CARDIOLOGY | Facility: HOSPITAL | Age: 39
End: 2019-12-30

## 2019-12-30 ENCOUNTER — TELEPHONE (OUTPATIENT)
Dept: HEMATOLOGY/ONCOLOGY | Facility: CLINIC | Age: 39
End: 2019-12-30

## 2019-12-30 ENCOUNTER — CLINICAL SUPPORT (OUTPATIENT)
Dept: CARDIOLOGY | Facility: HOSPITAL | Age: 39
End: 2019-12-30
Attending: INTERNAL MEDICINE
Payer: MEDICARE

## 2019-12-30 ENCOUNTER — DOCUMENTATION ONLY (OUTPATIENT)
Dept: HEMATOLOGY/ONCOLOGY | Facility: CLINIC | Age: 39
End: 2019-12-30

## 2019-12-30 DIAGNOSIS — T45.1X5A CHEMOTHERAPY INDUCED NEUTROPENIA: ICD-10-CM

## 2019-12-30 DIAGNOSIS — D70.1 CHEMOTHERAPY INDUCED NEUTROPENIA: ICD-10-CM

## 2019-12-30 DIAGNOSIS — Z17.1 MALIGNANT NEOPLASM OF OVERLAPPING SITES OF LEFT BREAST IN FEMALE, ESTROGEN RECEPTOR NEGATIVE: ICD-10-CM

## 2019-12-30 DIAGNOSIS — C50.812 MALIGNANT NEOPLASM OF OVERLAPPING SITES OF LEFT BREAST IN FEMALE, ESTROGEN RECEPTOR NEGATIVE: Primary | ICD-10-CM

## 2019-12-30 DIAGNOSIS — C50.812 MALIGNANT NEOPLASM OF OVERLAPPING SITES OF LEFT BREAST IN FEMALE, ESTROGEN RECEPTOR NEGATIVE: ICD-10-CM

## 2019-12-30 DIAGNOSIS — Z17.1 MALIGNANT NEOPLASM OF OVERLAPPING SITES OF LEFT BREAST IN FEMALE, ESTROGEN RECEPTOR NEGATIVE: Primary | ICD-10-CM

## 2019-12-30 LAB
AORTIC ROOT ANNULUS: 3.66 CM
AORTIC VALVE CUSP SEPERATION: 1.89 CM
AV INDEX (PROSTH): 0.91
AV MEAN GRADIENT: 5 MMHG
AV PEAK GRADIENT: 9 MMHG
AV VALVE AREA: 3.11 CM2
AV VELOCITY RATIO: 86.3
CV ECHO LV RWT: 0.51 CM
DOP CALC AO PEAK VEL: 1.49 M/S
DOP CALC AO VTI: 27.25 CM
DOP CALC LVOT AREA: 3.4 CM2
DOP CALC LVOT DIAMETER: 2.09 CM
DOP CALC LVOT PEAK VEL: 128.58 M/S
DOP CALC LVOT STROKE VOLUME: 84.7 CM3
DOP CALCLVOT PEAK VEL VTI: 24.7 CM
E WAVE DECELERATION TIME: 219.63 MSEC
E/A RATIO: 1.23
E/E' RATIO: 6.61 M/S
ECHO LV POSTERIOR WALL: 0.92 CM (ref 0.6–1.1)
FRACTIONAL SHORTENING: 42 % (ref 28–44)
INTERVENTRICULAR SEPTUM: 0.9 CM (ref 0.6–1.1)
LEFT ATRIUM SIZE: 3.53 CM
LEFT INTERNAL DIMENSION IN SYSTOLE: 2.12 CM (ref 2.1–4)
LEFT VENTRICLE DIASTOLIC VOLUME: 67.27 ML
LEFT VENTRICLE SYSTOLIC VOLUME: 26.62 ML
LEFT VENTRICULAR INTERNAL DIMENSION IN DIASTOLE: 3.63 CM (ref 3.5–6)
LEFT VENTRICULAR MASS: 95.49 G
LV LATERAL E/E' RATIO: 5.85 M/S
LV SEPTAL E/E' RATIO: 7.6 M/S
MV PEAK A VEL: 0.62 M/S
MV PEAK E VEL: 0.76 M/S
PV PEAK VELOCITY: 101.44 CM/S
RA PRESSURE: 3 MMHG
RIGHT VENTRICULAR END-DIASTOLIC DIMENSION: 219 CM
TDI LATERAL: 0.13 M/S
TDI SEPTAL: 0.1 M/S
TDI: 0.12 M/S

## 2019-12-30 PROCEDURE — 93306 TTE W/DOPPLER COMPLETE: CPT

## 2019-12-30 NOTE — PROGRESS NOTES
Received call from [facility] stating that pt is there for echo and pt would prefer to have labs drawn today at [facility] rather than usual standing order for labs from [facility].  One time lab order placed in Epic for CBC, CMP.

## 2020-01-03 ENCOUNTER — OFFICE VISIT (OUTPATIENT)
Dept: HEMATOLOGY/ONCOLOGY | Facility: CLINIC | Age: 40
End: 2020-01-03
Payer: MEDICARE

## 2020-01-03 VITALS
HEART RATE: 89 BPM | BODY MASS INDEX: 44.51 KG/M2 | RESPIRATION RATE: 19 BRPM | SYSTOLIC BLOOD PRESSURE: 120 MMHG | TEMPERATURE: 99 F | DIASTOLIC BLOOD PRESSURE: 83 MMHG | WEIGHT: 259.31 LBS

## 2020-01-03 DIAGNOSIS — Z17.1 MALIGNANT NEOPLASM OF OVERLAPPING SITES OF LEFT BREAST IN FEMALE, ESTROGEN RECEPTOR NEGATIVE: Primary | ICD-10-CM

## 2020-01-03 DIAGNOSIS — C50.812 MALIGNANT NEOPLASM OF OVERLAPPING SITES OF LEFT BREAST IN FEMALE, ESTROGEN RECEPTOR NEGATIVE: Primary | ICD-10-CM

## 2020-01-03 PROCEDURE — 99213 PR OFFICE/OUTPT VISIT, EST, LEVL III, 20-29 MIN: ICD-10-PCS | Mod: S$GLB,,, | Performed by: INTERNAL MEDICINE

## 2020-01-03 PROCEDURE — 99213 OFFICE O/P EST LOW 20 MIN: CPT | Mod: S$GLB,,, | Performed by: INTERNAL MEDICINE

## 2020-01-03 NOTE — PROGRESS NOTES
No c/o.  In good spirits.  URI sx, no fever.  Trial of Levaquin x's 10 days.    The port a catheter had flipped over previously, and re positioned per Dr. Elkins.    She had rectal bleeding.  Saw GI.    S/P chemo x's 4.  Neoadjuvant Rx.  Echo ej fx NL.    For Mamm, U/S 1/10/20.  For EGD, colonoscopy 1/13/20.  See me 1/14/20.  Chemo #5 1/15/20.    Left Breast Cancer, triple negative.    See me 1/14/20.

## 2020-01-10 ENCOUNTER — HOSPITAL ENCOUNTER (OUTPATIENT)
Dept: RADIOLOGY | Facility: HOSPITAL | Age: 40
Discharge: HOME | End: 2020-01-10
Attending: INTERNAL MEDICINE
Payer: MEDICARE

## 2020-01-10 VITALS — WEIGHT: 259.25 LBS | BODY MASS INDEX: 44.26 KG/M2 | HEIGHT: 64 IN

## 2020-01-10 DIAGNOSIS — Z17.1 ESTROGEN RECEPTOR NEGATIVE STATUS (ER-): ICD-10-CM

## 2020-01-10 DIAGNOSIS — C50.812 MALIGNANT NEOPLASM OF OVERLAPPING SITES OF LEFT FEMALE BREAST: ICD-10-CM

## 2020-01-10 PROCEDURE — 77061 BREAST TOMOSYNTHESIS UNI: CPT | Mod: TC,PO,LT

## 2020-01-10 PROCEDURE — 76642 ULTRASOUND BREAST LIMITED: CPT | Mod: TC,PO,LT

## 2020-01-10 PROCEDURE — 77065 DX MAMMO INCL CAD UNI: CPT | Mod: TC,PO,LT

## 2020-01-12 RX ORDER — ACETAMINOPHEN 325 MG/1
650 TABLET ORAL ONCE
Status: CANCELLED
Start: 2020-01-15

## 2020-01-12 RX ORDER — FAMOTIDINE 10 MG/ML
20 INJECTION INTRAVENOUS
Status: CANCELLED
Start: 2020-01-15

## 2020-01-12 RX ORDER — SODIUM CHLORIDE 0.9 % (FLUSH) 0.9 %
10 SYRINGE (ML) INJECTION
Status: CANCELLED | OUTPATIENT
Start: 2020-01-15

## 2020-01-12 RX ORDER — DOXORUBICIN HYDROCHLORIDE 2 MG/ML
50 INJECTION, SOLUTION INTRAVENOUS
Status: CANCELLED | OUTPATIENT
Start: 2020-01-15

## 2020-01-12 RX ORDER — HEPARIN 100 UNIT/ML
500 SYRINGE INTRAVENOUS
Status: CANCELLED | OUTPATIENT
Start: 2020-01-15

## 2020-01-13 ENCOUNTER — ANESTHESIA EVENT (OUTPATIENT)
Dept: SURGERY | Facility: HOSPITAL | Age: 40
End: 2020-01-13
Payer: MEDICARE

## 2020-01-13 ENCOUNTER — ANESTHESIA (OUTPATIENT)
Dept: SURGERY | Facility: HOSPITAL | Age: 40
End: 2020-01-13
Payer: MEDICARE

## 2020-01-13 ENCOUNTER — HOSPITAL ENCOUNTER (OUTPATIENT)
Facility: HOSPITAL | Age: 40
Discharge: HOME | End: 2020-01-13
Attending: INTERNAL MEDICINE | Admitting: INTERNAL MEDICINE
Payer: MEDICARE

## 2020-01-13 VITALS
RESPIRATION RATE: 18 BRPM | TEMPERATURE: 98 F | SYSTOLIC BLOOD PRESSURE: 123 MMHG | DIASTOLIC BLOOD PRESSURE: 87 MMHG | HEART RATE: 74 BPM | OXYGEN SATURATION: 100 %

## 2020-01-13 DIAGNOSIS — Z80.0 FAMILY HISTORY OF MALIGNANT NEOPLASM OF DIGESTIVE ORGANS: ICD-10-CM

## 2020-01-13 LAB
ALBUMIN SERPL BCP-MCNC: 3.6 G/DL (ref 3.5–5.2)
ALP SERPL-CCNC: 44 U/L (ref 55–135)
ALT SERPL W/O P-5'-P-CCNC: 21 U/L (ref 10–44)
ANION GAP SERPL CALC-SCNC: 8 MMOL/L (ref 8–16)
AST SERPL-CCNC: 22 U/L (ref 10–40)
BASOPHILS # BLD AUTO: 0.06 K/UL (ref 0–0.2)
BASOPHILS NFR BLD: 1.1 % (ref 0–1.9)
BILIRUB SERPL-MCNC: 1.1 MG/DL (ref 0.1–1)
BUN SERPL-MCNC: 7 MG/DL (ref 6–20)
CALCIUM SERPL-MCNC: 9.4 MG/DL (ref 8.7–10.5)
CHLORIDE SERPL-SCNC: 105 MMOL/L (ref 95–110)
CO2 SERPL-SCNC: 28 MMOL/L (ref 23–29)
CREAT SERPL-MCNC: 0.8 MG/DL (ref 0.5–1.4)
DIFFERENTIAL METHOD: ABNORMAL
EOSINOPHIL # BLD AUTO: 0.1 K/UL (ref 0–0.5)
EOSINOPHIL NFR BLD: 1.5 % (ref 0–8)
ERYTHROCYTE [DISTWIDTH] IN BLOOD BY AUTOMATED COUNT: 15.5 % (ref 11.5–14.5)
EST. GFR  (AFRICAN AMERICAN): >60 ML/MIN/1.73 M^2
EST. GFR  (NON AFRICAN AMERICAN): >60 ML/MIN/1.73 M^2
GLUCOSE SERPL-MCNC: 112 MG/DL (ref 70–110)
HCT VFR BLD AUTO: 37.1 % (ref 37–48.5)
HGB BLD-MCNC: 11.9 G/DL (ref 12–16)
IMM GRANULOCYTES # BLD AUTO: 0.01 K/UL (ref 0–0.04)
IMM GRANULOCYTES NFR BLD AUTO: 0.2 % (ref 0–0.5)
LYMPHOCYTES # BLD AUTO: 1.4 K/UL (ref 1–4.8)
LYMPHOCYTES NFR BLD: 25.5 % (ref 18–48)
MCH RBC QN AUTO: 31.2 PG (ref 27–31)
MCHC RBC AUTO-ENTMCNC: 32.1 G/DL (ref 32–36)
MCV RBC AUTO: 97 FL (ref 82–98)
MONOCYTES # BLD AUTO: 1 K/UL (ref 0.3–1)
MONOCYTES NFR BLD: 18.8 % (ref 4–15)
NEUTROPHILS # BLD AUTO: 2.9 K/UL (ref 1.8–7.7)
NEUTROPHILS NFR BLD: 52.9 % (ref 38–73)
NRBC BLD-RTO: 0 /100 WBC
PLATELET # BLD AUTO: 343 K/UL (ref 150–350)
PMV BLD AUTO: 8.5 FL (ref 9.2–12.9)
POTASSIUM SERPL-SCNC: 3.9 MMOL/L (ref 3.5–5.1)
PROT SERPL-MCNC: 6.7 G/DL (ref 6–8.4)
RBC # BLD AUTO: 3.81 M/UL (ref 4–5.4)
SODIUM SERPL-SCNC: 141 MMOL/L (ref 136–145)
WBC # BLD AUTO: 5.42 K/UL (ref 3.9–12.7)

## 2020-01-13 PROCEDURE — 25000003 PHARM REV CODE 250

## 2020-01-13 PROCEDURE — 27200043 HC FORCEPS, BIOPSY: Performed by: INTERNAL MEDICINE

## 2020-01-13 PROCEDURE — 27201114 HC TRAP (ANY): Performed by: INTERNAL MEDICINE

## 2020-01-13 PROCEDURE — 37000009 HC ANESTHESIA EA ADD 15 MINS: Performed by: INTERNAL MEDICINE

## 2020-01-13 PROCEDURE — 43239 EGD BIOPSY SINGLE/MULTIPLE: CPT | Performed by: INTERNAL MEDICINE

## 2020-01-13 PROCEDURE — 63600175 PHARM REV CODE 636 W HCPCS: Performed by: NURSE ANESTHETIST, CERTIFIED REGISTERED

## 2020-01-13 PROCEDURE — 85025 COMPLETE CBC W/AUTO DIFF WBC: CPT

## 2020-01-13 PROCEDURE — 37000008 HC ANESTHESIA 1ST 15 MINUTES: Performed by: INTERNAL MEDICINE

## 2020-01-13 PROCEDURE — 25000003 PHARM REV CODE 250: Performed by: INTERNAL MEDICINE

## 2020-01-13 PROCEDURE — 88305 TISSUE EXAM BY PATHOLOGIST: CPT | Mod: TC,59

## 2020-01-13 PROCEDURE — 80053 COMPREHEN METABOLIC PANEL: CPT

## 2020-01-13 PROCEDURE — 27201089 HC SNARE, DISP (ANY): Performed by: INTERNAL MEDICINE

## 2020-01-13 PROCEDURE — 45385 COLONOSCOPY W/LESION REMOVAL: CPT | Performed by: INTERNAL MEDICINE

## 2020-01-13 RX ORDER — SODIUM CHLORIDE 0.9 % (FLUSH) 0.9 %
2 SYRINGE (ML) INJECTION
Status: DISCONTINUED | OUTPATIENT
Start: 2020-01-13 | End: 2020-01-13 | Stop reason: HOSPADM

## 2020-01-13 RX ORDER — LEVOFLOXACIN 500 MG/1
500 TABLET, FILM COATED ORAL DAILY
COMMUNITY
End: 2021-03-17

## 2020-01-13 RX ORDER — SODIUM CHLORIDE 9 MG/ML
INJECTION, SOLUTION INTRAVENOUS CONTINUOUS
Status: DISCONTINUED | OUTPATIENT
Start: 2020-01-13 | End: 2020-01-13 | Stop reason: HOSPADM

## 2020-01-13 RX ORDER — PROPOFOL 10 MG/ML
VIAL (ML) INTRAVENOUS
Status: DISCONTINUED | OUTPATIENT
Start: 2020-01-13 | End: 2020-01-13

## 2020-01-13 RX ADMIN — SIMETHICONE 40 MG: 20 SUSPENSION/ DROPS ORAL at 09:01

## 2020-01-13 RX ADMIN — PROPOFOL 100 MG: 10 INJECTION, EMULSION INTRAVENOUS at 09:01

## 2020-01-13 RX ADMIN — PROPOFOL 180 MG: 10 INJECTION, EMULSION INTRAVENOUS at 08:01

## 2020-01-13 RX ADMIN — PROPOFOL 200 MG: 10 INJECTION, EMULSION INTRAVENOUS at 09:01

## 2020-01-13 RX ADMIN — PROPOFOL 150 MG: 10 INJECTION, EMULSION INTRAVENOUS at 09:01

## 2020-01-13 NOTE — ANESTHESIA PREPROCEDURE EVALUATION
01/13/2020  Sergio Clifford is a 39 y.o., female.  Patient Active Problem List   Diagnosis    Nondisplaced fracture of fourth metatarsal bone, left foot, initial encounter for closed fracture    Nondisplaced fracture of third metatarsal bone, left foot, initial encounter for closed fracture    Malignant neoplasm of overlapping sites of breast in female, estrogen receptor negative    Chemotherapy induced neutropenia       Past Surgical History:   Procedure Laterality Date    BRAIN AVM REPAIR  2006    BREAST BIOPSY  left    2002    CRANIOTOMY  2006    CVA tumor removal  2006    FOOT FRACTURE SURGERY      INSERTION OF CATHETER Right 9/30/2019    Procedure: INSERTION, CATHETER;  Surgeon: Caitlyn Elkins MD;  Location: [facility] OR;  Service: General;  Laterality: Right;    REVISION OF VASCULAR ACCESS PORT Right 12/13/2019    Procedure: REVISION, VASCULAR ACCESS PORT;  Surgeon: Caitlyn Elkins MD;  Location: [facility] OR;  Service: General;  Laterality: Right;  WOUND OPENED AND PORT FLIPPED.    VENTRICULOPERITONEAL SHUNT  2006    MAGNETIC - NO MRI        Tobacco Use:  The patient  reports that she quit smoking about 7 years ago. Her smoking use included cigarettes. She has a 1.00 pack-year smoking history. She has never used smokeless tobacco.     Results for orders placed or performed during the hospital encounter of 09/27/19   EKG 12-lead    Collection Time: 09/27/19  9:20 AM    Narrative    Test Reason : Z01.818,    Vent. Rate : 073 BPM     Atrial Rate : 073 BPM     P-R Int : 150 ms          QRS Dur : 074 ms      QT Int : 386 ms       P-R-T Axes : 032 044 036 degrees     QTc Int : 425 ms    Normal sinus rhythm  Normal ECG  When compared with ECG of 16-OCT-2012 13:48,  No significant change was found  Confirmed by Annmarie Willis MD ([id number]) on 9/27/2019 7:55:21 PM    Referred By: СВЕТЛАНА ELDER            Confirmed By:Annmarie Willis MD             Lab Results   Component Value Date    WBC 8.09 12/30/2019    HGB 11.1 (L) 12/30/2019    HCT 34.7 (L) 12/30/2019    MCV 98 12/30/2019     12/30/2019     BMP  Lab Results   Component Value Date     12/30/2019    K 3.9 12/30/2019     12/30/2019    CO2 29 12/30/2019    BUN 10 12/30/2019    CREATININE 0.8 12/30/2019    CALCIUM 9.1 12/30/2019    ANIONGAP 8 12/30/2019    ESTGFRAFRICA >60.0 12/30/2019    EGFRNONAA >60.0 12/30/2019 12/13/2019  Sergio Clifford is a 39 y.o., female.    Patient Active Problem List   Diagnosis    Nondisplaced fracture of fourth metatarsal bone, left foot, initial encounter for closed fracture    Nondisplaced fracture of third metatarsal bone, left foot, initial encounter for closed fracture    Malignant neoplasm of overlapping sites of breast in female, estrogen receptor negative    Chemotherapy induced neutropenia       Past Surgical History:   Procedure Laterality Date    BRAIN AVM REPAIR  2006    BREAST BIOPSY  left    2002    CRANIOTOMY  2006    CVA tumor removal  2006    FOOT FRACTURE SURGERY      INSERTION OF CATHETER Right 9/30/2019    Procedure: INSERTION, CATHETER;  Surgeon: Caitlyn Elkins MD;  Location: [facility];  Service: General;  Laterality: Right;    VENTRICULOPERITONEAL SHUNT  2006    MAGNETIC - NO MRI          Results for orders placed or performed during the hospital encounter of 09/27/19   EKG 12-lead    Collection Time: 09/27/19  9:20 AM    Narrative    Test Reason : Z01.818,    Vent. Rate : 073 BPM     Atrial Rate : 073 BPM     P-R Int : 150 ms          QRS Dur : 074 ms      QT Int : 386 ms       P-R-T Axes : 032 044 036 degrees     QTc Int : 425 ms    Normal sinus rhythm  Normal ECG  When compared with ECG of 16-OCT-2012 13:48,  No significant change was found  Confirmed by  Alba TAN, Annmarie ([id number]) on 9/27/2019 7:55:21 PM    Referred By: СВЕТЛАНА ELDER           Confirmed By:Annmarie Willis MD             Lab Results   Component Value Date    WBC 12.25 12/12/2019    HGB 12.0 12/12/2019    HCT 37.1 12/12/2019    MCV 96 12/12/2019     12/12/2019     BMP  Lab Results   Component Value Date     12/12/2019    K 3.6 12/12/2019     12/12/2019    CO2 29 12/12/2019    BUN 14 12/12/2019    CREATININE 0.6 12/12/2019    CALCIUM 9.5 12/12/2019    ANIONGAP 8 12/12/2019    ESTGFRAFRICA >60.0 12/12/2019    EGFRNONAA >60.0 12/12/2019             Pre-op Assessment    I have reviewed the Patient Summary Reports.     I have reviewed the Nursing Notes.   I have reviewed the Medications.   Decadron    Review of Systems  Anesthesia Hx:  No problems with previous Anesthesia Denies Hx of Anesthetic complications  History of prior surgery of interest to airway management or planning: Previous anesthesia: General Denies Family Hx of Anesthesia complications.   Denies Personal Hx of Anesthesia complications.   Social:  Former Smoker, Alcohol Use    Hematology/Oncology:        Current/Recent Cancer. Breast left no axillary node dissection no lymphedema chemotherapy and surgery   EENT/Dental:EENT/Dental Normal   Cardiovascular:   Valvular problems/Murmurs, MVP ECG has been reviewed. Followed by Dr. Deluna seen 6+ mo. Ago no change sin medicines or Tx.   Pulmonary:   Recent URI, resolved Bronchitis 2 mo. Ago without fever now   Residual non-productive cough at this time   Renal/:  Renal/ Normal     Hepatic/GI:   GERD, well controlled    Musculoskeletal:  Musculoskeletal Normal    Neurological:   CVA (h/o AVM, CVA 2006, s/p  shunt, uses walker for balance, good muscle strength), residual symptoms AVM s/p crainotomy   Patient with residual balance gait disturbance   Patient uses wqalker   Endocrine:  Endocrine Normal    Dermatological:  Skin Normal    Psych:   anxiety Claustrophobia          Physical Exam  General:  Well nourished, Obesity    Airway/Jaw/Neck:  Airway Findings: Mouth Opening: Normal Tongue: Normal  General Airway Assessment: Adult  Mallampati: III  Improves to III with phonation.  TM Distance: Normal, at least 6 cm  Jaw/Neck Findings:  Neck ROM: Normal ROM      Dental:  Dental Findings: (perm. lower bridge) In tact    Chest/Lungs:  Chest/Lungs Findings: Clear to auscultation, Normal Respiratory Rate     Heart/Vascular:  Heart Findings: Rate: Normal  Rhythm: Regular Rhythm  Sounds: Normal     Abdomen:  Abdomen Findings: Normal    Musculoskeletal:  Musculoskeletal Findings: Normal   Skin:  Skin Findings: Normal    Mental Status:  Mental Status Findings:  Cooperative, Alert and Oriented         Anesthesia Plan  Type of Anesthesia, risks & benefits discussed:  Anesthesia Type:  MAC  Patient's Preference:   Intra-op Monitoring Plan: standard ASA monitors  Intra-op Monitoring Plan Comments:   Post Op Pain Control Plan: per primary service following discharge from PACU  Post Op Pain Control Plan Comments:   Induction:   IV  Beta Blocker:  Patient is on a Beta-Blocker and has received one dose within the past 24 hours (No further documentation required).       Informed Consent: Patient understands risks and agrees with Anesthesia plan.  Questions answered. Anesthesia consent signed with patient.  ASA Score: 3     Day of Surgery Review of History & Physical: I have interviewed and examined the patient. I have reviewed the patient's H&P dated:  There are no significant changes.      Anesthesia Plan Notes: MAC   Benadryl 6.25 mg iv  Decadron 8 mg iv   Zofran 4mg iv  Pepcid 20mg iv   Tylenol 1000 mg po   _________________              Anesthesia Evaluation    I have reviewed the Patient Summary Reports.     I have reviewed the Nursing Notes.   I have reviewed the Medications.     Review of Systems  Anesthesia Hx:  No problems with previous Anesthesia  History of prior surgery of interest to  airway management or planning: Previous anesthesia: General Denies Family Hx of Anesthesia complications.   Denies Personal Hx of Anesthesia complications.   Social:  Former Smoker, No Alcohol Use    Hematology/Oncology:  Hematology Normal      Current/Recent Cancer. Breast left no axillary node dissection no lymphedema chemotherapy   EENT/Dental:EENT/Dental Normal   Cardiovascular:   Valvular problems/Murmurs, MVP ECG has been reviewed.    Pulmonary:   Recent URI, resolved    Renal/:  Renal/ Normal     Hepatic/GI:   GERD, well controlled Patient denies active N/V or intestinal obstruction   Musculoskeletal:  Musculoskeletal Normal    Neurological:   CVA Hx AVM  Tumor removal  Unsteady Gait uses walker    Shunt present   Endocrine:  Endocrine Normal    Dermatological:  Skin Normal    Psych:  Psychiatric Normal           Physical Exam  General:  Obesity    Airway/Jaw/Neck:  Airway Findings: Mouth Opening: Normal Tongue: Normal  General Airway Assessment: Adult  Mallampati: III  Improves to III with phonation.  TM Distance: < 4 cm  Jaw/Neck Findings:  Neck ROM: Normal ROM      Dental:  Dental Findings: Molar caps, In tact   Chest/Lungs:  Chest/Lungs Findings: Clear to auscultation, Normal Respiratory Rate     Heart/Vascular:  Heart Findings: Rate: Normal  Rhythm: Regular Rhythm  Sounds: Normal  Heart murmur: negative Vascular Findings: Normal    Abdomen:  Abdomen Findings: Normal    Musculoskeletal:  Musculoskeletal Findings: Normal   Skin:  Skin Findings: Normal    Mental Status:  Mental Status Findings:  Cooperative, Alert and Oriented         Anesthesia Plan  Type of Anesthesia, risks & benefits discussed:  Anesthesia Type:  MAC  Patient's Preference: MAC  Intra-op Monitoring Plan: standard ASA monitors  Intra-op Monitoring Plan Comments:   Post Op Pain Control Plan: per primary service following discharge from PACU  Post Op Pain Control Plan Comments:   Induction:    Beta Blocker:  Patient is on a  Beta-Blocker and has received one dose within the past 24 hours (No further documentation required).       Informed Consent: Patient understands risks and agrees with Anesthesia plan.  Questions answered. Anesthesia consent signed with patient.  ASA Score: 3     Day of Surgery Review of History & Physical:        Anesthesia Plan Notes: MAC  Use POM Mask  NO Versed         Ready For Surgery From Anesthesia Perspective.

## 2020-01-13 NOTE — [employer] PATIENT INSTRUCTIONS
Discharge Summary/Instructions after an Endoscopic Procedure  Patient Name: Sergio Clifford  Patient MRN: [id number]  Patient YOB: 1980  Monday, January 13, 2020  Librado Monroe MD  RESTRICTIONS:  During your procedure today, you received medications for sedation.  These   medications may affect your judgment, balance and coordination.  Therefore,   for 24 hours, you have the following restrictions:   - DO NOT drive a car, operate machinery, make legal/financial decisions,   sign important papers or drink alcohol.    ACTIVITY:  Today: no heavy lifting, straining or running due to procedural   sedation/anesthesia.  The following day: return to full activity including work.  DIET:  Eat and drink normally unless instructed otherwise.     TREATMENT FOR COMMON SIDE EFFECTS:  - Mild abdominal pain, nausea, belching, bloating or excessive gas:  rest,   eat lightly and use a heating pad.  - Sore Throat: treat with throat lozenges and/or gargle with warm salt   water.  - Because air was used during the procedure, expelling large amounts of air   from your rectum or belching is normal.  - If a bowel prep was taken, you may not have a bowel movement for 1-3 days.    This is normal.  SYMPTOMS TO WATCH FOR AND REPORT TO YOUR PHYSICIAN:  1. Abdominal pain or bloating, other than gas cramps.  2. Chest pain.  3. Back pain.  4. Signs of infection such as: chills or fever occurring within 24 hours   after the procedure.  5. Rectal bleeding, which would show as bright red, maroon, or black stools.   (A tablespoon of blood from the rectum is not serious, especially if   hemorrhoids are present.)  6. Vomiting.  7. Weakness or dizziness.  GO DIRECTLY TO THE NEAREST EMERGENCY ROOM IF YOU HAVE ANY OF THE FOLLOWING:      Difficulty breathing              Chills and/or fever over 101 F   Persistent vomiting and/or vomiting blood   Severe abdominal pain   Severe chest pain   Black, tarry stools   Bleeding- more than one  tablespoon   Any other symptom or condition that you feel may need urgent attention  Your doctor recommends these additional instructions:  If any biopsies were taken, your doctors clinic will contact you in 1 to 2   weeks with any results.  - Patient has a contact number available for emergencies.  The signs and   symptoms of potential delayed complications were discussed with the   patient.  Return to normal activities tomorrow.  Written discharge   instructions were provided to the patient.   - Resume previous diet.   - Continue present medications.   - Await pathology results.   - Repeat colonoscopy in 3 years for surveillance.   - Return to GI clinic in 3 weeks.   - Discharge patient to home (with escort).  For questions, problems or results please call your physician - Librado Monroe MD at Work:  [phone].  [facility], EMERGENCY ROOM PHONE NUMBER: [phone]  IF A COMPLICATION OR EMERGENCY SITUATION ARISES AND YOU ARE UNABLE TO REACH   YOUR PHYSICIAN - GO DIRECTLY TO THE EMERGENCY ROOM.  MD Librado Zhang MD  1/13/2020 9:45:58 AM  This report has been verified and signed electronically.  [employer]

## 2020-01-13 NOTE — ANESTHESIA POSTPROCEDURE EVALUATION
Anesthesia Post Evaluation    Patient: Sergio Clifford    Procedure(s) Performed: Procedure(s) (LRB):  COLONOSCOPY (N/A)  EGD (ESOPHAGOGASTRODUODENOSCOPY) (N/A)    Final Anesthesia Type: MAC    Patient location during evaluation: GI PACU  Patient participation: Yes- Able to Participate  Level of consciousness: awake and alert, oriented and awake  Post-procedure vital signs: reviewed and stable  Pain management: adequate  Airway patency: patent    PONV status at discharge: No PONV  Anesthetic complications: no      Cardiovascular status: blood pressure returned to baseline, hemodynamically stable and stable  Respiratory status: unassisted, spontaneous ventilation and room air  Hydration status: euvolemic  Follow-up not needed.  Comments: Patient states come performed for the procedure and without recall          Vitals Value Taken Time   /77 1/13/2020  8:40 AM   Temp 36.9 °C (98.4 °F) 1/13/2020  8:40 AM   Pulse 88 1/13/2020  8:40 AM   Resp 18 1/13/2020  8:40 AM   SpO2 96 % 1/13/2020  8:40 AM         No case tracking events are documented in the log.      Pain/Corazon Score: No data recorded

## 2020-01-13 NOTE — TRANSFER OF CARE
Anesthesia Transfer of Care Note    Patient: Sergio Clifford    Procedure(s) Performed: Procedure(s) (LRB):  COLONOSCOPY (N/A)  EGD (ESOPHAGOGASTRODUODENOSCOPY) (N/A)    Patient location: GI    Anesthesia Type: general and MAC    Transport from OR: Transported from OR on room air with adequate spontaneous ventilation    Post pain: adequate analgesia    Post assessment: no apparent anesthetic complications    Post vital signs: stable    Level of consciousness: awake and alert    Nausea/Vomiting: no nausea/vomiting    Complications: none    Transfer of care protocol was followed      Last vitals:   Visit Vitals  /77   Pulse 88   Temp 36.9 °C (98.4 °F)   Resp 18   SpO2 96%   Breastfeeding? No

## 2020-01-13 NOTE — PROVATION PATIENT INSTRUCTIONS
Discharge Summary/Instructions after an Endoscopic Procedure  Patient Name: Sergio Clifford  Patient MRN: [id number]  Patient YOB: 1980  Monday, January 13, 2020  Librado Monroe MD  RESTRICTIONS:  During your procedure today, you received medications for sedation.  These   medications may affect your judgment, balance and coordination.  Therefore,   for 24 hours, you have the following restrictions:   - DO NOT drive a car, operate machinery, make legal/financial decisions,   sign important papers or drink alcohol.    ACTIVITY:  Today: no heavy lifting, straining or running due to procedural   sedation/anesthesia.  The following day: return to full activity including work.  DIET:  Eat and drink normally unless instructed otherwise.     TREATMENT FOR COMMON SIDE EFFECTS:  - Mild abdominal pain, nausea, belching, bloating or excessive gas:  rest,   eat lightly and use a heating pad.  - Sore Throat: treat with throat lozenges and/or gargle with warm salt   water.  - Because air was used during the procedure, expelling large amounts of air   from your rectum or belching is normal.  - If a bowel prep was taken, you may not have a bowel movement for 1-3 days.    This is normal.  SYMPTOMS TO WATCH FOR AND REPORT TO YOUR PHYSICIAN:  1. Abdominal pain or bloating, other than gas cramps.  2. Chest pain.  3. Back pain.  4. Signs of infection such as: chills or fever occurring within 24 hours   after the procedure.  5. Rectal bleeding, which would show as bright red, maroon, or black stools.   (A tablespoon of blood from the rectum is not serious, especially if   hemorrhoids are present.)  6. Vomiting.  7. Weakness or dizziness.  GO DIRECTLY TO THE NEAREST EMERGENCY ROOM IF YOU HAVE ANY OF THE FOLLOWING:      Difficulty breathing              Chills and/or fever over 101 F   Persistent vomiting and/or vomiting blood   Severe abdominal pain   Severe chest pain   Black, tarry stools   Bleeding- more than one  tablespoon   Any other symptom or condition that you feel may need urgent attention  Your doctor recommends these additional instructions:  If any biopsies were taken, your doctors clinic will contact you in 1 to 2   weeks with any results.  - Patient has a contact number available for emergencies.  The signs and   symptoms of potential delayed complications were discussed with the   patient.  Return to normal activities tomorrow.  Written discharge   instructions were provided to the patient.   - Resume previous diet.   - Continue present medications.   - Await pathology results.   - Discharge patient to home (with escort).  For questions, problems or results please call your physician - Librado Monroe MD at Work:  [phone].  [facility], EMERGENCY ROOM PHONE NUMBER: [phone]  IF A COMPLICATION OR EMERGENCY SITUATION ARISES AND YOU ARE UNABLE TO REACH   YOUR PHYSICIAN - GO DIRECTLY TO THE EMERGENCY ROOM.  MD Librado Zhang MD  1/13/2020 9:43:40 AM  This report has been verified and signed electronically.  [employer]ATION

## 2020-01-13 NOTE — H&P
Chief Complaint:  gerd    HPI:      hpi- 39 F with history of breast cancer undergoing chemo 4/4 cycles per patient developed pain post port placement took some narcotics, became constipated and then had moderate amount of painelss rectal bleeding. no nausea,vomiting. has fam hx crc in father age 47, brother with polyps age 43, sister polyps age 50. no prior endoscopy    also admits to chronic poorly controlled gerd without dysphagia present for over 10 year.s  went to er the other day and had rectal - normal per patient (not interested in repeat exam today)    Admits to chronic gerd w/o dysphagia    Review of Systems:  Constitutional: No fever, weight loss  Eyes: No vision problems  ENT: No hearing problems, dysphagia  Cardiovascular: No chest pain or palpitations  Respiratory: No breathing problems or cough  GI: No diarrhea or constipation  LEXA: No urinary symptoms  Neurologic: No tremor or headaches  Musculoskeletal: No weakness or pain  Integumentary: no rashes or lesions  Psychiatric: no depression or anxiety  Complete ROS performed and negative unless stated above in HPI    Past Medical History:   Diagnosis Date    Anxiety 2006    AVM (arteriovenous malformation) 2006    Balance disorder 2006    from AVM  uses walker    Breast cancer 08/2019    Edema     Fractures 2012    left foot    MVP (mitral valve prolapse) 1997    Reflux     Wears glasses        Past Surgical History:   Procedure Laterality Date    BRAIN AVM REPAIR  2006    BREAST BIOPSY  left    2002    CRANIOTOMY  2006    CVA tumor removal  2006    FOOT FRACTURE SURGERY      INSERTION OF CATHETER Right 9/30/2019    Procedure: INSERTION, CATHETER;  Surgeon: Caitlyn Elkins MD;  Location: [facility] OR;  Service: General;  Laterality: Right;    REVISION OF VASCULAR ACCESS PORT Right 12/13/2019    Procedure: REVISION, VASCULAR ACCESS PORT;  Surgeon: Caitlyn Elkins MD;  Location: [facility] OR;  Service: General;  Laterality: Right;  WOUND OPENED AND PORT  FLIPPED.    VENTRICULOPERITONEAL SHUNT  2006    MAGNETIC - NO MRI       Family History   Problem Relation Age of Onset    Diabetes Neg Hx        Social History     Socioeconomic History    Marital status:      Spouse name: Not on file    Number of children: Not on file    Years of education: Not on file    Highest education level: Not on file   Occupational History    Not on file   Social Needs    Financial resource strain: Not on file    Food insecurity:     Worry: Not on file     Inability: Not on file    Transportation needs:     Medical: Not on file     Non-medical: Not on file   Tobacco Use    Smoking status: Former Smoker     Packs/day: 0.25     Years: 4.00     Pack years: 1.00     Types: Cigarettes     Last attempt to quit: 2012     Years since quittin.6    Smokeless tobacco: Never Used   Substance and Sexual Activity    Alcohol use: Yes     Comment: social    Drug use: No    Sexual activity: Not on file   Lifestyle    Physical activity:     Days per week: Not on file     Minutes per session: Not on file    Stress: Not on file   Relationships    Social connections:     Talks on phone: Not on file     Gets together: Not on file     Attends Orthodoxy service: Not on file     Active member of club or organization: Not on file     Attends meetings of clubs or organizations: Not on file     Relationship status: Not on file   Other Topics Concern    Not on file   Social History Narrative    Not on file       Review of patient's allergies indicates:   Allergen Reactions    Codeine Itching    Meperidine Other (See Comments)     hallucinations  Other reaction(s): Other (See Comments)  Hallucinations    Penicillins Hives and Rash       No current facility-administered medications on file prior to encounter.      Current Outpatient Medications on File Prior to Encounter   Medication Sig Dispense Refill    ascorbic acid/multivit-min (EMERGEN-C IMMUNE PLUS ORAL) Take 1 packet by  mouth once daily.      betaxolol (KERLONE) 10 mg Tab Take 5 mg by mouth once daily.      cyanocobalamin, vitamin B-12, (VITAMIN B-12 ORAL) Take 1 tablet by mouth once daily.      dexAMETHasone (DECADRON) 4 MG Tab Take 4 mg by mouth every 12 (twelve) hours.      esomeprazole (NEXIUM) 20 MG capsule Take 40 mg by mouth before breakfast.       fluticasone propionate (FLONASE) 50 mcg/actuation nasal spray 2 sprays by Each Nostril route once daily.      levocetirizine (XYZAL) 5 MG tablet Take 5 mg by mouth once daily.       levoFLOXacin (LEVAQUIN) 500 MG tablet Take 500 mg by mouth once daily.      ondansetron (ZOFRAN) 8 MG tablet Take 8 mg by mouth every 8 (eight) hours.      oxyCODONE-acetaminophen (PERCOCET) 5-325 mg per tablet Take 1 tablet by mouth every 8 (eight) hours as needed for Pain. 12 tablet 0    oxyCODONE-acetaminophen (PERCOCET) 5-325 mg per tablet Take 1 tablet by mouth every 8 (eight) hours as needed for Pain. 12 tablet 0    phytonadione, vitamin K1, (MEPHYTON) 5 mg Tab Take 5 mg by mouth once.      polyethylene glycol (GLYCOLAX) 17 gram PwPk Take 17 g by mouth once daily. 30 each 0    promethazine (PHENERGAN) 25 MG tablet Take 25 mg by mouth every 6 (six) hours as needed for Nausea.      sertraline (ZOLOFT) 50 MG tablet Take 50 mg by mouth every morning.  3    VENTOLIN HFA 90 mcg/actuation inhaler Inhale 2 puffs into the lungs every 6 (six) hours as needed.       vitamin A 8000 UNIT capsule Take 8,000 Units by mouth once daily.      vitamin D (VITAMIN D3) 1000 units Tab Take 1,000 Units by mouth once daily.         Objective:  /77   Pulse 88   Temp 98.4 °F (36.9 °C)   Resp 18   SpO2 96%   Breastfeeding? No   General: wd, wn, nad  HE: ncat, perrl, eomi  ENT: op pink and moist without lesions or exudates  CV: +s1s2, no mrg, rrr  Resp: ctapb, no wrr  GI: bs active, abd soft, nt, nd  Skin: no lesions, no rash  Neuro: cn 2-12 in tact, no focal deficits, no asterixis  Psych: regular  rate speech, normal affect    Labs:  Recent Results (from the past 2688 hour(s))   Echo Color Flow Doppler? Yes; Bubble Contrast? No    Collection Time: 09/23/19 12:55 PM   Result Value Ref Range    BSA 2.26 m2    TDI SEPTAL 0.09 m/s    LV LATERAL E/E' RATIO 7.54 m/s    LV SEPTAL E/E' RATIO 10.89 m/s    AORTIC VALVE CUSP SEPERATION 1.46 cm    TDI LATERAL 0.13 m/s    PV PEAK VELOCITY 101.04 cm/s    LVIDD 3.05 (A) 3.5 - 6.0 cm    IVS 0.92 0.6 - 1.1 cm    PW 0.90 0.6 - 1.1 cm    Ao root annulus 2.45 cm    LVIDS 2.12 2.1 - 4.0 cm    FS 30 28 - 44 %    LV mass 73.09 g    LA size 3.73 cm    Left Ventricle Relative Wall Thickness 0.59 cm    AV mean gradient 3 mmHg    AV valve area 3.08 cm2    AV Velocity Ratio 95.19     AV index (prosthetic) 0.97     E/A ratio 1.44     Mean e' 0.11 m/s    E wave decelartion time 249.38 msec    LVOT diameter 2.01 cm    LVOT area 3.2 cm2    LVOT peak wade 107.56 m/s    LVOT peak VTI 21.76 cm    Ao peak wade 1.13 m/s    Ao VTI 22.42 cm    LVOT stroke volume 69.01 cm3    AV peak gradient 5 mmHg    E/E' ratio 8.91 m/s    MV Peak E Wade 0.98 m/s    TR Max Wade 1.89 m/s    MV Peak A Wade 0.68 m/s    LV Mass Index 34 g/m2    Triscuspid Valve Regurgitation Peak Gradient 14 mmHg    Right Atrial Pressure (from IVC) 3 mmHg    TV rest pulmonary artery pressure 17 mmHg   CBC Without Differential    Collection Time: 09/27/19  9:10 AM   Result Value Ref Range    WBC 7.35 3.90 - 12.70 K/uL    RBC 4.44 4.00 - 5.40 M/uL    Hemoglobin 13.0 12.0 - 16.0 g/dL    Hematocrit 40.8 37.0 - 48.5 %    Mean Corpuscular Volume 92 82 - 98 fL    Mean Corpuscular Hemoglobin 29.3 27.0 - 31.0 pg    Mean Corpuscular Hemoglobin Conc 31.9 (L) 32.0 - 36.0 g/dL    RDW 13.2 11.5 - 14.5 %    Platelets 276 150 - 350 K/uL    MPV 8.9 (L) 9.2 - 12.9 fL   Basic metabolic panel    Collection Time: 09/27/19  9:10 AM   Result Value Ref Range    Sodium 139 136 - 145 mmol/L    Potassium 3.8 3.5 - 5.1 mmol/L    Chloride 102 95 - 110 mmol/L    CO2  31 (H) 23 - 29 mmol/L    Glucose 95 70 - 110 mg/dL    BUN, Bld 13 6 - 20 mg/dL    Creatinine 0.8 0.5 - 1.4 mg/dL    Calcium 8.8 8.7 - 10.5 mg/dL    Anion Gap 6 (L) 8 - 16 mmol/L    eGFR if African American >60.0 >60 mL/min/1.73 m^2    eGFR if non African American >60.0 >60 mL/min/1.73 m^2   Urinalysis    Collection Time: 09/27/19 10:10 AM   Result Value Ref Range    Specimen UA Urine, Clean Catch     Color, UA Yellow Yellow, Straw, Magdalena    Appearance, UA Cloudy (A) Clear    pH, UA 6.0 5.0 - 8.0    Specific Gravity, UA 1.020 1.005 - 1.030    Protein, UA Trace (A) Negative    Glucose, UA Negative Negative    Ketones, UA Negative Negative    Bilirubin (UA) Negative Negative    Occult Blood UA Negative Negative    Nitrite, UA Negative Negative    Urobilinogen, UA 2.0-3.0 (A) Negative EU/dL    Leukocytes, UA 2+ (A) Negative   Urinalysis Microscopic    Collection Time: 09/27/19 10:10 AM   Result Value Ref Range    RBC, UA 1 0 - 4 /hpf    WBC, UA 32 (H) 0 - 5 /hpf    Bacteria Occasional None-Occ /hpf    Squam Epithel, UA 43 /hpf    Hyaline Casts,  (A) 0-1/lpf /lpf    Microscopic Comment SEE COMMENT    Pregnancy, urine rapid    Collection Time: 09/27/19 10:10 AM   Result Value Ref Range    Preg Test, Ur Negative    CBC Without Differential    Collection Time: 12/12/19 12:44 PM   Result Value Ref Range    WBC 12.25 3.90 - 12.70 K/uL    RBC 3.88 (L) 4.00 - 5.40 M/uL    Hemoglobin 12.0 12.0 - 16.0 g/dL    Hematocrit 37.1 37.0 - 48.5 %    Mean Corpuscular Volume 96 82 - 98 fL    Mean Corpuscular Hemoglobin 30.9 27.0 - 31.0 pg    Mean Corpuscular Hemoglobin Conc 32.3 32.0 - 36.0 g/dL    RDW 18.2 (H) 11.5 - 14.5 %    Platelets 336 150 - 350 K/uL    MPV 8.4 (L) 9.2 - 12.9 fL   Basic metabolic panel    Collection Time: 12/12/19 12:44 PM   Result Value Ref Range    Sodium 139 136 - 145 mmol/L    Potassium 3.6 3.5 - 5.1 mmol/L    Chloride 102 95 - 110 mmol/L    CO2 29 23 - 29 mmol/L    Glucose 153 (H) 70 - 110 mg/dL    BUN,  Bld 14 6 - 20 mg/dL    Creatinine 0.6 0.5 - 1.4 mg/dL    Calcium 9.5 8.7 - 10.5 mg/dL    Anion Gap 8 8 - 16 mmol/L    eGFR if African American >60.0 >60 mL/min/1.73 m^2    eGFR if non African American >60.0 >60 mL/min/1.73 m^2   POCT urine pregnancy    Collection Time: 12/13/19  6:53 AM   Result Value Ref Range    POC Preg Test, Ur Negative Negative     Acceptable Yes    Aerobic culture    Collection Time: 12/13/19  9:18 AM   Result Value Ref Range    Aerobic Bacterial Culture Skin merissa,  no predominant organism    Culture, Anaerobic    Collection Time: 12/13/19  9:18 AM   Result Value Ref Range    Anaerobic Culture No anaerobes isolated    AFB Culture & Smear    Collection Time: 12/13/19  9:18 AM   Result Value Ref Range    AFB CULTURE STAIN No acid fast bacilli seen.     AFB CULTURE STAIN Testing performed by:     AFB CULTURE STAIN Lab [facility]     AFB CULTURE STAIN [address] [place] [address]     [place] [address]     AFB CULTURE STAIN Dr.Brian Go MD    Fungus culture    Collection Time: 12/13/19  9:18 AM   Result Value Ref Range    Fungus (Mycology) Culture No fungal growth to date     Fungus (Mycology) Culture No fungal growth to date     Fungus (Mycology) Culture No fungal growth to date     Fungus (Mycology) Culture No fungal growth to date     Fungus (Mycology) Culture No fungal growth to date    Gram stain    Collection Time: 12/13/19  9:18 AM   Result Value Ref Range    Gram Stain Result Rare WBC's     Gram Stain Result Rare Gram positive cocci    CBC auto differential    Collection Time: 12/15/19  1:00 PM   Result Value Ref Range    WBC 13.68 (H) 3.90 - 12.70 K/uL    RBC 3.96 (L) 4.00 - 5.40 M/uL    Hemoglobin 12.3 12.0 - 16.0 g/dL    Hematocrit 38.2 37.0 - 48.5 %    Mean Corpuscular Volume 97 82 - 98 fL    Mean Corpuscular Hemoglobin 31.1 (H) 27.0 - 31.0 pg    Mean Corpuscular Hemoglobin Conc 32.2 32.0 - 36.0 g/dL    RDW 17.9 (H) 11.5 - 14.5 %    Platelets  366 (H) 150 - 350 K/uL    MPV 8.9 (L) 9.2 - 12.9 fL    Immature Granulocytes 0.8 (H) 0.0 - 0.5 %    Gran # (ANC) 10.9 (H) 1.8 - 7.7 K/uL    Immature Grans (Abs) 0.11 (H) 0.00 - 0.04 K/uL    Lymph # 1.4 1.0 - 4.8 K/uL    Mono # 1.3 (H) 0.3 - 1.0 K/uL    Eos # 0.0 0.0 - 0.5 K/uL    Baso # 0.02 0.00 - 0.20 K/uL    nRBC 0 0 /100 WBC    Gran% 80.0 (H) 38.0 - 73.0 %    Lymph% 9.9 (L) 18.0 - 48.0 %    Mono% 9.2 4.0 - 15.0 %    Eosinophil% 0.0 0.0 - 8.0 %    Basophil% 0.1 0.0 - 1.9 %    Differential Method Automated    Occult blood x 1, stool    Collection Time: 12/15/19  2:19 PM   Result Value Ref Range    Occult Blood Positive (A) Negative   Echo Color Flow Doppler? Yes; Bubble Contrast? No    Collection Time: 12/30/19  1:48 PM   Result Value Ref Range    TDI SEPTAL 0.10 m/s    LV LATERAL E/E' RATIO 5.85 m/s    LV SEPTAL E/E' RATIO 7.60 m/s    AORTIC VALVE CUSP SEPERATION 1.89 cm    TDI LATERAL 0.13 m/s    PV PEAK VELOCITY 101.44 cm/s    LVIDD 3.63 3.5 - 6.0 cm    IVS 0.90 0.6 - 1.1 cm    PW 0.92 0.6 - 1.1 cm    Ao root annulus 3.66 cm    LVIDS 2.12 2.1 - 4.0 cm    FS 42 28 - 44 %    LV mass 95.49 g    LA size 3.53 cm    RVDD 219.00 cm    Left Ventricle Relative Wall Thickness 0.51 cm    AV mean gradient 5 mmHg    AV valve area 3.11 cm2    AV Velocity Ratio 86.30     AV index (prosthetic) 0.91     E/A ratio 1.23     Mean e' 0.12 m/s    E wave decelartion time 219.63 msec    LVOT diameter 2.09 cm    LVOT area 3.4 cm2    LVOT peak wade 128.58 m/s    LVOT peak VTI 24.70 cm    Ao peak wade 1.49 m/s    Ao VTI 27.25 cm    LVOT stroke volume 84.70 cm3    AV peak gradient 9 mmHg    E/E' ratio 6.61 m/s    MV Peak E Wade 0.76 m/s    MV Peak A Wade 0.62 m/s    LV Systolic Volume 26.62 mL    LV Diastolic Volume 67.27 mL    Right Atrial Pressure (from IVC) 3 mmHg   Comprehensive metabolic panel    Collection Time: 12/30/19  3:19 PM   Result Value Ref Range    Sodium 141 136 - 145 mmol/L    Potassium 3.9 3.5 - 5.1 mmol/L    Chloride 104 95  - 110 mmol/L    CO2 29 23 - 29 mmol/L    Glucose 112 (H) 70 - 110 mg/dL    BUN, Bld 10 6 - 20 mg/dL    Creatinine 0.8 0.5 - 1.4 mg/dL    Calcium 9.1 8.7 - 10.5 mg/dL    Total Protein 6.6 6.0 - 8.4 g/dL    Albumin 3.6 3.5 - 5.2 g/dL    Total Bilirubin 0.7 0.1 - 1.0 mg/dL    Alkaline Phosphatase 45 (L) 55 - 135 U/L    AST 26 10 - 40 U/L    ALT 31 10 - 44 U/L    Anion Gap 8 8 - 16 mmol/L    eGFR if African American >60.0 >60 mL/min/1.73 m^2    eGFR if non African American >60.0 >60 mL/min/1.73 m^2   CBC auto differential    Collection Time: 12/30/19  3:24 PM   Result Value Ref Range    WBC 8.09 3.90 - 12.70 K/uL    RBC 3.56 (L) 4.00 - 5.40 M/uL    Hemoglobin 11.1 (L) 12.0 - 16.0 g/dL    Hematocrit 34.7 (L) 37.0 - 48.5 %    Mean Corpuscular Volume 98 82 - 98 fL    Mean Corpuscular Hemoglobin 31.2 (H) 27.0 - 31.0 pg    Mean Corpuscular Hemoglobin Conc 32.0 32.0 - 36.0 g/dL    RDW 16.0 (H) 11.5 - 14.5 %    Platelets 202 150 - 350 K/uL    MPV 9.0 (L) 9.2 - 12.9 fL    Immature Granulocytes CANCELED 0.0 - 0.5 %    Immature Grans (Abs) CANCELED 0.00 - 0.04 K/uL    nRBC 1 (A) 0 /100 WBC    Gran% 56.0 38.0 - 73.0 %    Lymph% 27.0 18.0 - 48.0 %    Mono% 10.0 4.0 - 15.0 %    Eosinophil% 1.0 0.0 - 8.0 %    Basophil% 0.0 0.0 - 1.9 %    Bands 6.0 %    Aniso Slight     Poly Occasional     Differential Method Manual    CBC auto differential    Collection Time: 01/13/20  8:44 AM   Result Value Ref Range    WBC 5.42 3.90 - 12.70 K/uL    RBC 3.81 (L) 4.00 - 5.40 M/uL    Hemoglobin 11.9 (L) 12.0 - 16.0 g/dL    Hematocrit 37.1 37.0 - 48.5 %    Mean Corpuscular Volume 97 82 - 98 fL    Mean Corpuscular Hemoglobin 31.2 (H) 27.0 - 31.0 pg    Mean Corpuscular Hemoglobin Conc 32.1 32.0 - 36.0 g/dL    RDW 15.5 (H) 11.5 - 14.5 %    Platelets 343 150 - 350 K/uL    MPV 8.5 (L) 9.2 - 12.9 fL    Immature Granulocytes 0.2 0.0 - 0.5 %    Gran # (ANC) 2.9 1.8 - 7.7 K/uL    Immature Grans (Abs) 0.01 0.00 - 0.04 K/uL    Lymph # 1.4 1.0 - 4.8 K/uL    Mono #  1.0 0.3 - 1.0 K/uL    Eos # 0.1 0.0 - 0.5 K/uL    Baso # 0.06 0.00 - 0.20 K/uL    nRBC 0 0 /100 WBC    Gran% 52.9 38.0 - 73.0 %    Lymph% 25.5 18.0 - 48.0 %    Mono% 18.8 (H) 4.0 - 15.0 %    Eosinophil% 1.5 0.0 - 8.0 %    Basophil% 1.1 0.0 - 1.9 %    Differential Method Automated           Assessment:  GERD    Plan:  EGD soon

## 2020-01-14 ENCOUNTER — OFFICE VISIT (OUTPATIENT)
Dept: HEMATOLOGY/ONCOLOGY | Facility: CLINIC | Age: 40
End: 2020-01-14
Payer: MEDICARE

## 2020-01-14 VITALS
HEART RATE: 86 BPM | DIASTOLIC BLOOD PRESSURE: 74 MMHG | SYSTOLIC BLOOD PRESSURE: 132 MMHG | RESPIRATION RATE: 20 BRPM | TEMPERATURE: 98 F | BODY MASS INDEX: 44.15 KG/M2 | WEIGHT: 257.19 LBS

## 2020-01-14 DIAGNOSIS — Z17.1 MALIGNANT NEOPLASM OF OVERLAPPING SITES OF LEFT BREAST IN FEMALE, ESTROGEN RECEPTOR NEGATIVE: Primary | ICD-10-CM

## 2020-01-14 DIAGNOSIS — C50.812 MALIGNANT NEOPLASM OF OVERLAPPING SITES OF LEFT BREAST IN FEMALE, ESTROGEN RECEPTOR NEGATIVE: Primary | ICD-10-CM

## 2020-01-14 LAB — FUNGUS SPEC CULT: NORMAL

## 2020-01-14 PROCEDURE — 99213 PR OFFICE/OUTPT VISIT, EST, LEVL III, 20-29 MIN: ICD-10-PCS | Mod: S$GLB,,, | Performed by: INTERNAL MEDICINE

## 2020-01-14 PROCEDURE — 99213 OFFICE O/P EST LOW 20 MIN: CPT | Mod: S$GLB,,, | Performed by: INTERNAL MEDICINE

## 2020-01-15 ENCOUNTER — INFUSION (OUTPATIENT)
Dept: INFUSION THERAPY | Facility: HOSPITAL | Age: 40
End: 2020-01-15
Attending: INTERNAL MEDICINE
Payer: MEDICARE

## 2020-01-15 VITALS
WEIGHT: 258 LBS | RESPIRATION RATE: 22 BRPM | HEIGHT: 64 IN | HEART RATE: 105 BPM | TEMPERATURE: 98 F | BODY MASS INDEX: 44.05 KG/M2 | OXYGEN SATURATION: 95 % | DIASTOLIC BLOOD PRESSURE: 75 MMHG | SYSTOLIC BLOOD PRESSURE: 111 MMHG

## 2020-01-15 DIAGNOSIS — D70.1 CHEMOTHERAPY INDUCED NEUTROPENIA: Primary | ICD-10-CM

## 2020-01-15 DIAGNOSIS — Z17.1 MALIGNANT NEOPLASM OF OVERLAPPING SITES OF LEFT BREAST IN FEMALE, ESTROGEN RECEPTOR NEGATIVE: ICD-10-CM

## 2020-01-15 DIAGNOSIS — C50.812 MALIGNANT NEOPLASM OF OVERLAPPING SITES OF LEFT BREAST IN FEMALE, ESTROGEN RECEPTOR NEGATIVE: ICD-10-CM

## 2020-01-15 DIAGNOSIS — T45.1X5A CHEMOTHERAPY INDUCED NEUTROPENIA: Primary | ICD-10-CM

## 2020-01-15 PROCEDURE — S0028 INJECTION, FAMOTIDINE, 20 MG: HCPCS | Performed by: INTERNAL MEDICINE

## 2020-01-15 PROCEDURE — 96367 TX/PROPH/DG ADDL SEQ IV INF: CPT

## 2020-01-15 PROCEDURE — 96413 CHEMO IV INFUSION 1 HR: CPT

## 2020-01-15 PROCEDURE — 96411 CHEMO IV PUSH ADDL DRUG: CPT

## 2020-01-15 PROCEDURE — 63600175 PHARM REV CODE 636 W HCPCS: Performed by: INTERNAL MEDICINE

## 2020-01-15 PROCEDURE — 25000003 PHARM REV CODE 250: Performed by: INTERNAL MEDICINE

## 2020-01-15 PROCEDURE — 96375 TX/PRO/DX INJ NEW DRUG ADDON: CPT

## 2020-01-15 PROCEDURE — 96417 CHEMO IV INFUS EACH ADDL SEQ: CPT

## 2020-01-15 PROCEDURE — A4216 STERILE WATER/SALINE, 10 ML: HCPCS | Performed by: INTERNAL MEDICINE

## 2020-01-15 RX ORDER — FAMOTIDINE 10 MG/ML
20 INJECTION INTRAVENOUS
Status: COMPLETED | OUTPATIENT
Start: 2020-01-15 | End: 2020-01-15

## 2020-01-15 RX ORDER — ACETAMINOPHEN 325 MG/1
650 TABLET ORAL ONCE
Status: COMPLETED | OUTPATIENT
Start: 2020-01-15 | End: 2020-01-15

## 2020-01-15 RX ORDER — DOXORUBICIN HYDROCHLORIDE 2 MG/ML
50 INJECTION, SOLUTION INTRAVENOUS
Status: COMPLETED | OUTPATIENT
Start: 2020-01-15 | End: 2020-01-15

## 2020-01-15 RX ORDER — SODIUM CHLORIDE 0.9 % (FLUSH) 0.9 %
10 SYRINGE (ML) INJECTION
Status: DISCONTINUED | OUTPATIENT
Start: 2020-01-15 | End: 2020-01-15 | Stop reason: HOSPADM

## 2020-01-15 RX ORDER — HEPARIN 100 UNIT/ML
500 SYRINGE INTRAVENOUS
Status: DISCONTINUED | OUTPATIENT
Start: 2020-01-15 | End: 2020-01-15 | Stop reason: HOSPADM

## 2020-01-15 RX ADMIN — ONDANSETRON 16 MG: 2 INJECTION INTRAMUSCULAR; INTRAVENOUS at 11:01

## 2020-01-15 RX ADMIN — CYCLOPHOSPHAMIDE 1145 MG: 1 INJECTION, POWDER, FOR SOLUTION INTRAVENOUS; ORAL at 02:01

## 2020-01-15 RX ADMIN — SODIUM CHLORIDE 10 ML: 9 INJECTION INTRAMUSCULAR; INTRAVENOUS; SUBCUTANEOUS at 03:01

## 2020-01-15 RX ADMIN — HEPARIN 500 UNITS: 100 SYRINGE at 03:01

## 2020-01-15 RX ADMIN — FAMOTIDINE 20 MG: 10 INJECTION, SOLUTION INTRAVENOUS at 11:01

## 2020-01-15 RX ADMIN — DEXAMETHASONE SODIUM PHOSPHATE 10 MG: 4 INJECTION, SOLUTION INTRA-ARTICULAR; INTRALESIONAL; INTRAMUSCULAR; INTRAVENOUS; SOFT TISSUE at 01:01

## 2020-01-15 RX ADMIN — DOXORUBICIN HYDROCHLORIDE 114 MG: 2 INJECTION, SOLUTION INTRAVENOUS at 02:01

## 2020-01-15 RX ADMIN — APREPITANT 130 MG: 130 INJECTION, EMULSION INTRAVENOUS at 12:01

## 2020-01-15 RX ADMIN — ACETAMINOPHEN 650 MG: 325 TABLET ORAL at 11:01

## 2020-01-15 RX ADMIN — SODIUM CHLORIDE: 0.9 INJECTION, SOLUTION INTRAVENOUS at 11:01

## 2020-01-15 RX ADMIN — DIPHENHYDRAMINE HYDROCHLORIDE 25 MG: 50 INJECTION INTRAMUSCULAR; INTRAVENOUS at 12:01

## 2020-01-15 RX ADMIN — DOCETAXEL 170 MG: 20 INJECTION, SOLUTION, CONCENTRATE INTRAVENOUS at 01:01

## 2020-01-16 ENCOUNTER — INFUSION (OUTPATIENT)
Dept: INFUSION THERAPY | Facility: HOSPITAL | Age: 40
End: 2020-01-16
Attending: INTERNAL MEDICINE
Payer: MEDICARE

## 2020-01-16 VITALS
TEMPERATURE: 98 F | HEIGHT: 64 IN | HEART RATE: 89 BPM | RESPIRATION RATE: 21 BRPM | OXYGEN SATURATION: 96 % | WEIGHT: 258.69 LBS | BODY MASS INDEX: 44.16 KG/M2 | SYSTOLIC BLOOD PRESSURE: 124 MMHG | DIASTOLIC BLOOD PRESSURE: 75 MMHG

## 2020-01-16 DIAGNOSIS — T45.1X5A CHEMOTHERAPY INDUCED NEUTROPENIA: Primary | ICD-10-CM

## 2020-01-16 DIAGNOSIS — C50.812 MALIGNANT NEOPLASM OF OVERLAPPING SITES OF LEFT BREAST IN FEMALE, ESTROGEN RECEPTOR NEGATIVE: ICD-10-CM

## 2020-01-16 DIAGNOSIS — Z17.1 MALIGNANT NEOPLASM OF OVERLAPPING SITES OF LEFT BREAST IN FEMALE, ESTROGEN RECEPTOR NEGATIVE: ICD-10-CM

## 2020-01-16 DIAGNOSIS — D70.1 CHEMOTHERAPY INDUCED NEUTROPENIA: Primary | ICD-10-CM

## 2020-01-16 PROCEDURE — 96365 THER/PROPH/DIAG IV INF INIT: CPT

## 2020-01-16 PROCEDURE — 25000003 PHARM REV CODE 250: Performed by: INTERNAL MEDICINE

## 2020-01-16 PROCEDURE — A4216 STERILE WATER/SALINE, 10 ML: HCPCS | Performed by: INTERNAL MEDICINE

## 2020-01-16 PROCEDURE — 63600175 PHARM REV CODE 636 W HCPCS: Performed by: INTERNAL MEDICINE

## 2020-01-16 PROCEDURE — 96372 THER/PROPH/DIAG INJ SC/IM: CPT | Mod: 59

## 2020-01-16 PROCEDURE — 96367 TX/PROPH/DG ADDL SEQ IV INF: CPT

## 2020-01-16 RX ORDER — HEPARIN 100 UNIT/ML
500 SYRINGE INTRAVENOUS
Status: COMPLETED | OUTPATIENT
Start: 2020-01-16 | End: 2020-01-16

## 2020-01-16 RX ORDER — HEPARIN 100 UNIT/ML
500 SYRINGE INTRAVENOUS
Status: CANCELLED | OUTPATIENT
Start: 2020-01-16

## 2020-01-16 RX ORDER — SODIUM CHLORIDE 0.9 % (FLUSH) 0.9 %
10 SYRINGE (ML) INJECTION
Status: COMPLETED | OUTPATIENT
Start: 2020-01-16 | End: 2020-01-16

## 2020-01-16 RX ORDER — SODIUM CHLORIDE 0.9 % (FLUSH) 0.9 %
10 SYRINGE (ML) INJECTION
Status: CANCELLED | OUTPATIENT
Start: 2020-01-16

## 2020-01-16 RX ADMIN — ONDANSETRON 16 MG: 2 INJECTION INTRAMUSCULAR; INTRAVENOUS at 11:01

## 2020-01-16 RX ADMIN — HEPARIN 500 UNITS: 100 SYRINGE at 12:01

## 2020-01-16 RX ADMIN — SODIUM CHLORIDE 10 ML: 9 INJECTION INTRAMUSCULAR; INTRAVENOUS; SUBCUTANEOUS at 12:01

## 2020-01-16 RX ADMIN — PEGFILGRASTIM-CBQV 6 MG: 6 INJECTION, SOLUTION SUBCUTANEOUS at 12:01

## 2020-01-16 RX ADMIN — DEXAMETHASONE SODIUM PHOSPHATE 10 MG: 4 INJECTION, SOLUTION INTRA-ARTICULAR; INTRALESIONAL; INTRAMUSCULAR; INTRAVENOUS; SOFT TISSUE at 11:01

## 2020-01-23 ENCOUNTER — TELEPHONE (OUTPATIENT)
Dept: HEMATOLOGY/ONCOLOGY | Facility: CLINIC | Age: 40
End: 2020-01-23

## 2020-01-23 NOTE — TELEPHONE ENCOUNTER
----- Message from Gayla Angel sent at 1/22/2020  3:33 PM CST -----  Pt wants to know if Dr Serna could call in her more antibiotics. She has sore in her mouth, cough, neck hurting, stuffy nose. Its difficult to talk with mouth sores. No fever. Please call the pt back when possible.    # [phone]

## 2020-01-23 NOTE — TELEPHONE ENCOUNTER
Called in prescription of magic mouthwash and zithromax 250 mg #6, 2 po day 1, 1 po day 2, 3, 4, 5. 0 refills.

## 2020-01-28 ENCOUNTER — TELEPHONE (OUTPATIENT)
Dept: HEMATOLOGY/ONCOLOGY | Facility: CLINIC | Age: 40
End: 2020-01-28

## 2020-01-28 NOTE — TELEPHONE ENCOUNTER
Spoke to pt regarding pain in nipple and under arm left breast.  Attempted to give pt an appt to see our NP tomorrow morning but pt states pain has gone away. It hurt when she first woke up but has gone away. Will call for an appt if pain comes back.

## 2020-01-31 LAB
ACID FAST MOD KINY STN SPEC: NORMAL
MYCOBACTERIUM SPEC QL CULT: NORMAL

## 2020-02-05 ENCOUNTER — INFUSION (OUTPATIENT)
Dept: INFUSION THERAPY | Facility: HOSPITAL | Age: 40
End: 2020-02-05
Attending: INTERNAL MEDICINE
Payer: MEDICARE

## 2020-02-05 VITALS
DIASTOLIC BLOOD PRESSURE: 84 MMHG | WEIGHT: 254.94 LBS | OXYGEN SATURATION: 96 % | SYSTOLIC BLOOD PRESSURE: 123 MMHG | RESPIRATION RATE: 18 BRPM | BODY MASS INDEX: 43.52 KG/M2 | TEMPERATURE: 98 F | HEIGHT: 64 IN | HEART RATE: 77 BPM

## 2020-02-05 DIAGNOSIS — T45.1X5A CHEMOTHERAPY INDUCED NEUTROPENIA: Primary | ICD-10-CM

## 2020-02-05 DIAGNOSIS — C50.812 MALIGNANT NEOPLASM OF OVERLAPPING SITES OF LEFT BREAST IN FEMALE, ESTROGEN RECEPTOR NEGATIVE: ICD-10-CM

## 2020-02-05 DIAGNOSIS — D70.1 CHEMOTHERAPY INDUCED NEUTROPENIA: Primary | ICD-10-CM

## 2020-02-05 DIAGNOSIS — Z17.1 MALIGNANT NEOPLASM OF OVERLAPPING SITES OF LEFT BREAST IN FEMALE, ESTROGEN RECEPTOR NEGATIVE: ICD-10-CM

## 2020-02-05 PROCEDURE — 96413 CHEMO IV INFUSION 1 HR: CPT

## 2020-02-05 PROCEDURE — 25000003 PHARM REV CODE 250: Performed by: INTERNAL MEDICINE

## 2020-02-05 PROCEDURE — S0028 INJECTION, FAMOTIDINE, 20 MG: HCPCS | Performed by: INTERNAL MEDICINE

## 2020-02-05 PROCEDURE — 96417 CHEMO IV INFUS EACH ADDL SEQ: CPT

## 2020-02-05 PROCEDURE — A4216 STERILE WATER/SALINE, 10 ML: HCPCS | Performed by: INTERNAL MEDICINE

## 2020-02-05 PROCEDURE — 96367 TX/PROPH/DG ADDL SEQ IV INF: CPT

## 2020-02-05 PROCEDURE — 63600175 PHARM REV CODE 636 W HCPCS: Performed by: INTERNAL MEDICINE

## 2020-02-05 PROCEDURE — 96411 CHEMO IV PUSH ADDL DRUG: CPT

## 2020-02-05 PROCEDURE — 96375 TX/PRO/DX INJ NEW DRUG ADDON: CPT

## 2020-02-05 RX ORDER — HEPARIN 100 UNIT/ML
500 SYRINGE INTRAVENOUS
Status: CANCELLED | OUTPATIENT
Start: 2020-02-05

## 2020-02-05 RX ORDER — DOXORUBICIN HYDROCHLORIDE 2 MG/ML
50 INJECTION, SOLUTION INTRAVENOUS
Status: CANCELLED | OUTPATIENT
Start: 2020-02-05

## 2020-02-05 RX ORDER — HEPARIN 100 UNIT/ML
500 SYRINGE INTRAVENOUS
Status: DISCONTINUED | OUTPATIENT
Start: 2020-02-05 | End: 2020-02-05 | Stop reason: HOSPADM

## 2020-02-05 RX ORDER — FAMOTIDINE 10 MG/ML
20 INJECTION INTRAVENOUS
Status: COMPLETED | OUTPATIENT
Start: 2020-02-05 | End: 2020-02-05

## 2020-02-05 RX ORDER — ACETAMINOPHEN 325 MG/1
650 TABLET ORAL ONCE
Status: COMPLETED | OUTPATIENT
Start: 2020-02-05 | End: 2020-02-05

## 2020-02-05 RX ORDER — SODIUM CHLORIDE 0.9 % (FLUSH) 0.9 %
10 SYRINGE (ML) INJECTION
Status: CANCELLED | OUTPATIENT
Start: 2020-02-05

## 2020-02-05 RX ORDER — SODIUM CHLORIDE 0.9 % (FLUSH) 0.9 %
10 SYRINGE (ML) INJECTION
Status: DISCONTINUED | OUTPATIENT
Start: 2020-02-05 | End: 2020-02-05 | Stop reason: HOSPADM

## 2020-02-05 RX ORDER — ACETAMINOPHEN 325 MG/1
650 TABLET ORAL ONCE
Status: CANCELLED
Start: 2020-02-05

## 2020-02-05 RX ORDER — DOXORUBICIN HYDROCHLORIDE 2 MG/ML
50 INJECTION, SOLUTION INTRAVENOUS
Status: COMPLETED | OUTPATIENT
Start: 2020-02-05 | End: 2020-02-05

## 2020-02-05 RX ORDER — FAMOTIDINE 10 MG/ML
20 INJECTION INTRAVENOUS
Status: CANCELLED
Start: 2020-02-05

## 2020-02-05 RX ADMIN — SODIUM CHLORIDE: 0.9 INJECTION, SOLUTION INTRAVENOUS at 08:02

## 2020-02-05 RX ADMIN — ACETAMINOPHEN 650 MG: 325 TABLET ORAL at 08:02

## 2020-02-05 RX ADMIN — DIPHENHYDRAMINE HYDROCHLORIDE 25 MG: 50 INJECTION INTRAMUSCULAR; INTRAVENOUS at 09:02

## 2020-02-05 RX ADMIN — DOXORUBICIN HYDROCHLORIDE 114 MG: 2 INJECTION, SOLUTION INTRAVENOUS at 10:02

## 2020-02-05 RX ADMIN — ONDANSETRON 16 MG: 2 INJECTION INTRAMUSCULAR; INTRAVENOUS at 09:02

## 2020-02-05 RX ADMIN — SODIUM CHLORIDE, PRESERVATIVE FREE 10 ML: 5 INJECTION INTRAVENOUS at 12:02

## 2020-02-05 RX ADMIN — DEXAMETHASONE SODIUM PHOSPHATE 10 MG: 4 INJECTION, SOLUTION INTRA-ARTICULAR; INTRALESIONAL; INTRAMUSCULAR; INTRAVENOUS; SOFT TISSUE at 08:02

## 2020-02-05 RX ADMIN — FAMOTIDINE 20 MG: 10 INJECTION, SOLUTION INTRAVENOUS at 08:02

## 2020-02-05 RX ADMIN — CYCLOPHOSPHAMIDE 1145 MG: 1 INJECTION, POWDER, FOR SOLUTION INTRAVENOUS; ORAL at 10:02

## 2020-02-05 RX ADMIN — HEPARIN 500 UNITS: 100 SYRINGE at 12:02

## 2020-02-05 RX ADMIN — DOCETAXEL ANHYDROUS 170 MG: 10 INJECTION, SOLUTION INTRAVENOUS at 11:02

## 2020-02-05 RX ADMIN — APREPITANT 130 MG: 130 INJECTION, EMULSION INTRAVENOUS at 09:02

## 2020-02-05 NOTE — PLAN OF CARE
Nausea medication given as pre-medication for chemotherapy treatment. Patient educated on home nausea management.

## 2020-02-06 ENCOUNTER — INFUSION (OUTPATIENT)
Dept: INFUSION THERAPY | Facility: HOSPITAL | Age: 40
End: 2020-02-06
Attending: INTERNAL MEDICINE
Payer: MEDICARE

## 2020-02-06 ENCOUNTER — OFFICE VISIT (OUTPATIENT)
Dept: HEMATOLOGY/ONCOLOGY | Facility: CLINIC | Age: 40
End: 2020-02-06
Payer: MEDICARE

## 2020-02-06 VITALS
HEART RATE: 68 BPM | WEIGHT: 258.25 LBS | RESPIRATION RATE: 18 BRPM | DIASTOLIC BLOOD PRESSURE: 83 MMHG | SYSTOLIC BLOOD PRESSURE: 133 MMHG | TEMPERATURE: 98 F | HEIGHT: 64 IN | BODY MASS INDEX: 44.09 KG/M2

## 2020-02-06 VITALS
DIASTOLIC BLOOD PRESSURE: 79 MMHG | RESPIRATION RATE: 18 BRPM | WEIGHT: 258 LBS | TEMPERATURE: 98 F | BODY MASS INDEX: 44.29 KG/M2 | HEART RATE: 70 BPM | SYSTOLIC BLOOD PRESSURE: 118 MMHG

## 2020-02-06 DIAGNOSIS — Z17.1 MALIGNANT NEOPLASM OF OVERLAPPING SITES OF LEFT BREAST IN FEMALE, ESTROGEN RECEPTOR NEGATIVE: ICD-10-CM

## 2020-02-06 DIAGNOSIS — C50.812 MALIGNANT NEOPLASM OF OVERLAPPING SITES OF LEFT BREAST IN FEMALE, ESTROGEN RECEPTOR NEGATIVE: Primary | ICD-10-CM

## 2020-02-06 DIAGNOSIS — Z17.1 MALIGNANT NEOPLASM OF OVERLAPPING SITES OF LEFT BREAST IN FEMALE, ESTROGEN RECEPTOR NEGATIVE: Primary | ICD-10-CM

## 2020-02-06 DIAGNOSIS — T45.1X5A CHEMOTHERAPY INDUCED NEUTROPENIA: Primary | ICD-10-CM

## 2020-02-06 DIAGNOSIS — C50.812 MALIGNANT NEOPLASM OF OVERLAPPING SITES OF LEFT BREAST IN FEMALE, ESTROGEN RECEPTOR NEGATIVE: ICD-10-CM

## 2020-02-06 DIAGNOSIS — D70.1 CHEMOTHERAPY INDUCED NEUTROPENIA: Primary | ICD-10-CM

## 2020-02-06 PROCEDURE — A4216 STERILE WATER/SALINE, 10 ML: HCPCS | Performed by: INTERNAL MEDICINE

## 2020-02-06 PROCEDURE — 96367 TX/PROPH/DG ADDL SEQ IV INF: CPT

## 2020-02-06 PROCEDURE — 96365 THER/PROPH/DIAG IV INF INIT: CPT

## 2020-02-06 PROCEDURE — 25000003 PHARM REV CODE 250: Performed by: INTERNAL MEDICINE

## 2020-02-06 PROCEDURE — 96372 THER/PROPH/DIAG INJ SC/IM: CPT

## 2020-02-06 PROCEDURE — 63600175 PHARM REV CODE 636 W HCPCS: Performed by: INTERNAL MEDICINE

## 2020-02-06 PROCEDURE — 99213 PR OFFICE/OUTPT VISIT, EST, LEVL III, 20-29 MIN: ICD-10-PCS | Mod: S$GLB,,, | Performed by: INTERNAL MEDICINE

## 2020-02-06 PROCEDURE — 99213 OFFICE O/P EST LOW 20 MIN: CPT | Mod: S$GLB,,, | Performed by: INTERNAL MEDICINE

## 2020-02-06 RX ORDER — SODIUM CHLORIDE 0.9 % (FLUSH) 0.9 %
10 SYRINGE (ML) INJECTION
Status: COMPLETED | OUTPATIENT
Start: 2020-02-06 | End: 2020-02-06

## 2020-02-06 RX ORDER — HEPARIN 100 UNIT/ML
500 SYRINGE INTRAVENOUS
Status: CANCELLED | OUTPATIENT
Start: 2020-02-06

## 2020-02-06 RX ORDER — SODIUM CHLORIDE 0.9 % (FLUSH) 0.9 %
10 SYRINGE (ML) INJECTION
Status: CANCELLED | OUTPATIENT
Start: 2020-02-06

## 2020-02-06 RX ORDER — HEPARIN 100 UNIT/ML
500 SYRINGE INTRAVENOUS
Status: COMPLETED | OUTPATIENT
Start: 2020-02-06 | End: 2020-02-06

## 2020-02-06 RX ADMIN — DEXAMETHASONE SODIUM PHOSPHATE 10 MG: 4 INJECTION, SOLUTION INTRA-ARTICULAR; INTRALESIONAL; INTRAMUSCULAR; INTRAVENOUS; SOFT TISSUE at 11:02

## 2020-02-06 RX ADMIN — ONDANSETRON 16 MG: 2 INJECTION INTRAMUSCULAR; INTRAVENOUS at 11:02

## 2020-02-06 RX ADMIN — PEGFILGRASTIM-CBQV 6 MG: 6 INJECTION, SOLUTION SUBCUTANEOUS at 11:02

## 2020-02-06 RX ADMIN — HEPARIN 500 UNITS: 100 SYRINGE at 12:02

## 2020-02-06 RX ADMIN — SODIUM CHLORIDE, PRESERVATIVE FREE 10 ML: 5 INJECTION INTRAVENOUS at 12:02

## 2020-02-07 NOTE — PROGRESS NOTES
No c/o.  In good spirits.  Trial of Levaquin x's 10 days completed. URI sx resolved.    The port a catheter had flipped over previously, and re positioned per Dr. Elkins.    She had rectal bleeding.  Saw GI.    S/P chemo x's 6 cycles  Neoadjuvant Rx. Now.  Echo ej fx NL.    EGD, colonoscopy 1/13/20.  Left Breast Cancer, triple negative.    L breast mamm and u/s 2/26/20  RTC 2/28/20.    Refer for surgery thereafter.

## 2020-02-10 ENCOUNTER — LAB VISIT (OUTPATIENT)
Dept: LAB | Facility: HOSPITAL | Age: 40
End: 2020-02-10
Attending: INTERNAL MEDICINE
Payer: MEDICARE

## 2020-02-10 DIAGNOSIS — T45.1X5A CHEMOTHERAPY INDUCED NEUTROPENIA: ICD-10-CM

## 2020-02-10 DIAGNOSIS — Z17.1 MALIGNANT NEOPLASM OF OVERLAPPING SITES OF LEFT BREAST IN FEMALE, ESTROGEN RECEPTOR NEGATIVE: ICD-10-CM

## 2020-02-10 DIAGNOSIS — C50.812 MALIGNANT NEOPLASM OF OVERLAPPING SITES OF LEFT BREAST IN FEMALE, ESTROGEN RECEPTOR NEGATIVE: ICD-10-CM

## 2020-02-10 DIAGNOSIS — D70.1 CHEMOTHERAPY INDUCED NEUTROPENIA: ICD-10-CM

## 2020-02-10 LAB
ALBUMIN SERPL BCP-MCNC: 3.8 G/DL (ref 3.5–5.2)
ALP SERPL-CCNC: 57 U/L (ref 55–135)
ALT SERPL W/O P-5'-P-CCNC: 22 U/L (ref 10–44)
ANION GAP SERPL CALC-SCNC: 12 MMOL/L (ref 8–16)
ANISOCYTOSIS BLD QL SMEAR: SLIGHT
AST SERPL-CCNC: 16 U/L (ref 10–40)
BASOPHILS NFR BLD: 0 % (ref 0–1.9)
BILIRUB SERPL-MCNC: 0.9 MG/DL (ref 0.1–1)
BUN SERPL-MCNC: 13 MG/DL (ref 6–20)
CALCIUM SERPL-MCNC: 9.3 MG/DL (ref 8.7–10.5)
CHLORIDE SERPL-SCNC: 100 MMOL/L (ref 95–110)
CO2 SERPL-SCNC: 27 MMOL/L (ref 23–29)
CREAT SERPL-MCNC: 0.6 MG/DL (ref 0.5–1.4)
DACRYOCYTES BLD QL SMEAR: ABNORMAL
DIFFERENTIAL METHOD: ABNORMAL
EOSINOPHIL NFR BLD: 0 % (ref 0–8)
ERYTHROCYTE [DISTWIDTH] IN BLOOD BY AUTOMATED COUNT: 14.8 % (ref 11.5–14.5)
EST. GFR  (AFRICAN AMERICAN): >60 ML/MIN/1.73 M^2
EST. GFR  (NON AFRICAN AMERICAN): >60 ML/MIN/1.73 M^2
GLUCOSE SERPL-MCNC: 124 MG/DL (ref 70–110)
HCT VFR BLD AUTO: 37.9 % (ref 37–48.5)
HGB BLD-MCNC: 12 G/DL (ref 12–16)
IMM GRANULOCYTES # BLD AUTO: ABNORMAL K/UL (ref 0–0.04)
IMM GRANULOCYTES NFR BLD AUTO: ABNORMAL % (ref 0–0.5)
LYMPHOCYTES NFR BLD: 17 % (ref 18–48)
MCH RBC QN AUTO: 30.8 PG (ref 27–31)
MCHC RBC AUTO-ENTMCNC: 31.7 G/DL (ref 32–36)
MCV RBC AUTO: 97 FL (ref 82–98)
MONOCYTES NFR BLD: 6 % (ref 4–15)
NEUTROPHILS NFR BLD: 77 % (ref 38–73)
NRBC BLD-RTO: 0 /100 WBC
OVALOCYTES BLD QL SMEAR: ABNORMAL
PLATELET # BLD AUTO: 145 K/UL (ref 150–350)
PLATELET BLD QL SMEAR: ABNORMAL
PMV BLD AUTO: 9.2 FL (ref 9.2–12.9)
POIKILOCYTOSIS BLD QL SMEAR: SLIGHT
POTASSIUM SERPL-SCNC: 4.2 MMOL/L (ref 3.5–5.1)
PROT SERPL-MCNC: 6.8 G/DL (ref 6–8.4)
RBC # BLD AUTO: 3.89 M/UL (ref 4–5.4)
SODIUM SERPL-SCNC: 139 MMOL/L (ref 136–145)
WBC # BLD AUTO: 4.24 K/UL (ref 3.9–12.7)

## 2020-02-10 PROCEDURE — 36415 COLL VENOUS BLD VENIPUNCTURE: CPT

## 2020-02-10 PROCEDURE — 85027 COMPLETE CBC AUTOMATED: CPT

## 2020-02-10 PROCEDURE — 80053 COMPREHEN METABOLIC PANEL: CPT

## 2020-02-10 PROCEDURE — 85007 BL SMEAR W/DIFF WBC COUNT: CPT

## 2020-02-10 NOTE — PROGRESS NOTES
No c/o.  In good spirits.  URI sx, no fever.  Trial of Levaquin x's 10 days completed.    The port a catheter had flipped over previously, and re positioned per Dr. Elkins.    She had rectal bleeding.  Saw GI.  Dr. Monroe.  EGD gastric gastropathy  Colonoscopy polyp removed, adenoma.    S/P chemo x's 4.  Neoadjuvant Rx.  Echo ej fx NL.    Mamm, u/s  Decreased mass effect 1.5 cm L breast.      Left Breast Cancer, triple negative.  Improved after 4 cycles of chemo.    Give cycle 5,6 and then re evaluate.  1/15/20,  2/5/20.    Later surgery [facility].

## 2020-02-12 ENCOUNTER — TELEPHONE (OUTPATIENT)
Dept: HEMATOLOGY/ONCOLOGY | Facility: CLINIC | Age: 40
End: 2020-02-12

## 2020-02-12 NOTE — TELEPHONE ENCOUNTER
----- Message from Danielle Hurt, Patient Care Assistant sent at 2/12/2020  8:33 AM CST -----  Patient called in stating she has sores in her mouth and they are very painful. She can be reached at [phone]

## 2020-02-12 NOTE — TELEPHONE ENCOUNTER
Spoke to pt. She states that magic mouthwash is not helping her.  Called in prescription of viscous xylocaine 2% solution. #200 cc 1 tsp swished and spit out every 4 hours prn mouth sores.

## 2020-02-13 ENCOUNTER — TELEPHONE (OUTPATIENT)
Dept: HEMATOLOGY/ONCOLOGY | Facility: CLINIC | Age: 40
End: 2020-02-13

## 2020-02-13 ENCOUNTER — OFFICE VISIT (OUTPATIENT)
Dept: HEMATOLOGY/ONCOLOGY | Facility: CLINIC | Age: 40
End: 2020-02-13
Payer: MEDICARE

## 2020-02-13 VITALS
DIASTOLIC BLOOD PRESSURE: 76 MMHG | RESPIRATION RATE: 19 BRPM | BODY MASS INDEX: 42.69 KG/M2 | SYSTOLIC BLOOD PRESSURE: 110 MMHG | WEIGHT: 248.69 LBS | HEART RATE: 111 BPM | TEMPERATURE: 99 F

## 2020-02-13 DIAGNOSIS — B37.0 ORAL CANDIDA: Primary | ICD-10-CM

## 2020-02-13 DIAGNOSIS — D70.1 CHEMOTHERAPY INDUCED NEUTROPENIA: ICD-10-CM

## 2020-02-13 DIAGNOSIS — T45.1X5A CHEMOTHERAPY INDUCED NEUTROPENIA: ICD-10-CM

## 2020-02-13 DIAGNOSIS — C50.812 MALIGNANT NEOPLASM OF OVERLAPPING SITES OF LEFT BREAST IN FEMALE, ESTROGEN RECEPTOR NEGATIVE: ICD-10-CM

## 2020-02-13 DIAGNOSIS — Z17.1 MALIGNANT NEOPLASM OF OVERLAPPING SITES OF LEFT BREAST IN FEMALE, ESTROGEN RECEPTOR NEGATIVE: ICD-10-CM

## 2020-02-13 PROCEDURE — 99213 OFFICE O/P EST LOW 20 MIN: CPT | Mod: S$GLB,,, | Performed by: INTERNAL MEDICINE

## 2020-02-13 PROCEDURE — 99213 PR OFFICE/OUTPT VISIT, EST, LEVL III, 20-29 MIN: ICD-10-PCS | Mod: S$GLB,,, | Performed by: INTERNAL MEDICINE

## 2020-02-13 NOTE — TELEPHONE ENCOUNTER
----- Message from Danielle Hurt, Patient Care Assistant sent at 2/13/2020  1:46 PM CST -----  Patient called in stating that her mouth sores are not getting any better , she stated she was told by the on call Doctor to go to the E.R but she didn't go and she is still having the mouth sores, she want to know if it is anything else she can do. She can be reached at [phone]

## 2020-02-14 ENCOUNTER — INFUSION (OUTPATIENT)
Dept: INFUSION THERAPY | Facility: HOSPITAL | Age: 40
End: 2020-02-14
Attending: INTERNAL MEDICINE
Payer: MEDICARE

## 2020-02-14 VITALS
HEIGHT: 64 IN | SYSTOLIC BLOOD PRESSURE: 111 MMHG | BODY MASS INDEX: 42.63 KG/M2 | RESPIRATION RATE: 20 BRPM | TEMPERATURE: 99 F | DIASTOLIC BLOOD PRESSURE: 68 MMHG | HEART RATE: 104 BPM | OXYGEN SATURATION: 97 % | WEIGHT: 249.69 LBS

## 2020-02-14 DIAGNOSIS — Z17.1 MALIGNANT NEOPLASM OF OVERLAPPING SITES OF LEFT BREAST IN FEMALE, ESTROGEN RECEPTOR NEGATIVE: Primary | ICD-10-CM

## 2020-02-14 DIAGNOSIS — C50.812 MALIGNANT NEOPLASM OF OVERLAPPING SITES OF LEFT BREAST IN FEMALE, ESTROGEN RECEPTOR NEGATIVE: Primary | ICD-10-CM

## 2020-02-14 PROCEDURE — 63600175 PHARM REV CODE 636 W HCPCS: Performed by: INTERNAL MEDICINE

## 2020-02-14 PROCEDURE — 96361 HYDRATE IV INFUSION ADD-ON: CPT

## 2020-02-14 PROCEDURE — 96360 HYDRATION IV INFUSION INIT: CPT

## 2020-02-14 RX ORDER — SODIUM CHLORIDE 0.9 % (FLUSH) 0.9 %
10 SYRINGE (ML) INJECTION
Status: CANCELLED | OUTPATIENT
Start: 2020-02-14

## 2020-02-14 RX ORDER — HEPARIN 100 UNIT/ML
500 SYRINGE INTRAVENOUS
Status: CANCELLED | OUTPATIENT
Start: 2020-02-14

## 2020-02-14 RX ORDER — SODIUM CHLORIDE 0.9 % (FLUSH) 0.9 %
10 SYRINGE (ML) INJECTION
Status: DISCONTINUED | OUTPATIENT
Start: 2020-02-14 | End: 2020-02-14 | Stop reason: HOSPADM

## 2020-02-14 RX ORDER — HEPARIN 100 UNIT/ML
500 SYRINGE INTRAVENOUS
Status: DISCONTINUED | OUTPATIENT
Start: 2020-02-14 | End: 2020-02-14 | Stop reason: HOSPADM

## 2020-02-14 RX ADMIN — HEPARIN 500 UNITS: 100 SYRINGE at 01:02

## 2020-02-14 RX ADMIN — SODIUM CHLORIDE 1000 ML: 0.9 INJECTION, SOLUTION INTRAVENOUS at 11:02

## 2020-02-14 NOTE — PROGRESS NOTES
She had rectal bleeding.  Saw GI.    S/P chemo x's 6 cycles  Neoadjuvant Rx. Now.  Echo ej fx NL.    EGD, colonoscopy 1/13/20.  Left Breast Cancer, triple negative.    L breast mamm and u/s 2/26/20  RTC 2/28/20.    Refer for surgery thereafter.    After #6, she has called for appt.  C/O mouth soreness.  Mucosa is intact, but  She has white plaques on tongue, oral candida  Suspected.    Trial of Mycelex estelita qid.    Await mamm, u/s results.  For surgery thereafter.

## 2020-02-26 ENCOUNTER — HOSPITAL ENCOUNTER (OUTPATIENT)
Dept: RADIOLOGY | Facility: HOSPITAL | Age: 40
Discharge: HOME | End: 2020-02-26
Attending: INTERNAL MEDICINE
Payer: MEDICARE

## 2020-02-26 DIAGNOSIS — Z17.1 MALIGNANT NEOPLASM OF OVERLAPPING SITES OF LEFT BREAST IN FEMALE, ESTROGEN RECEPTOR NEGATIVE: ICD-10-CM

## 2020-02-26 DIAGNOSIS — C50.812 MALIGNANT NEOPLASM OF OVERLAPPING SITES OF LEFT BREAST IN FEMALE, ESTROGEN RECEPTOR NEGATIVE: ICD-10-CM

## 2020-02-26 PROCEDURE — 77062 BREAST TOMOSYNTHESIS BI: CPT | Mod: TC,PO

## 2020-02-26 PROCEDURE — 76642 ULTRASOUND BREAST LIMITED: CPT | Mod: TC,50,PO

## 2020-02-27 ENCOUNTER — TELEPHONE (OUTPATIENT)
Dept: HEMATOLOGY/ONCOLOGY | Facility: CLINIC | Age: 40
End: 2020-02-27

## 2020-02-27 ENCOUNTER — OFFICE VISIT (OUTPATIENT)
Dept: HEMATOLOGY/ONCOLOGY | Facility: CLINIC | Age: 40
End: 2020-02-27
Payer: MEDICARE

## 2020-02-27 VITALS
HEART RATE: 87 BPM | WEIGHT: 253 LBS | TEMPERATURE: 98 F | SYSTOLIC BLOOD PRESSURE: 120 MMHG | BODY MASS INDEX: 43.43 KG/M2 | RESPIRATION RATE: 20 BRPM | DIASTOLIC BLOOD PRESSURE: 74 MMHG

## 2020-02-27 DIAGNOSIS — Z17.1 MALIGNANT NEOPLASM OF LOWER-OUTER QUADRANT OF LEFT BREAST OF FEMALE, ESTROGEN RECEPTOR NEGATIVE: Primary | ICD-10-CM

## 2020-02-27 DIAGNOSIS — C50.512 MALIGNANT NEOPLASM OF LOWER-OUTER QUADRANT OF LEFT BREAST OF FEMALE, ESTROGEN RECEPTOR NEGATIVE: Primary | ICD-10-CM

## 2020-02-27 PROCEDURE — 99214 PR OFFICE/OUTPT VISIT, EST, LEVL IV, 30-39 MIN: ICD-10-PCS | Mod: S$GLB,,, | Performed by: INTERNAL MEDICINE

## 2020-02-27 PROCEDURE — 99214 OFFICE O/P EST MOD 30 MIN: CPT | Mod: S$GLB,,, | Performed by: INTERNAL MEDICINE

## 2020-02-27 NOTE — TELEPHONE ENCOUNTER
----- Message from Juli Franklin sent at 2/27/2020  8:57 AM CST -----  The patient called to see when her follow up appointment is after she had her mammogram done yesterday. She does not have any appointments scheduled. She needs an appointment to discuss her results. Please call her back at [phone].

## 2020-03-01 ENCOUNTER — TELEPHONE (OUTPATIENT)
Dept: HEMATOLOGY/ONCOLOGY | Facility: CLINIC | Age: 40
End: 2020-03-01

## 2020-03-02 NOTE — PROGRESS NOTES
She had rectal bleeding.  Saw GI.    S/P chemo x's 6 cycles  Neoadjuvant Rx. Now.  Echo ej fx NL.    EGD, colonoscopy 1/13/20.  Left Breast Cancer, triple negative.    L breast mamm and u/s 2/26/20  RTC 2/28/20.    Refer for surgery thereafter.    After #6, she has called for appt.  C/O mouth soreness.  Mucosa is intact, but  She has white plaques on tongue, oral candida  Suspected.    Trial of Mycelex estelita qid.  She returns today, mucositis, candida better.    Mamm/U/S mass effect 2.8 cm to 1 cm, improved L breast.  R breast with fat necrosis.    Considered getting a MRI, but she has CNS shunt in place, S/P surgery for AVM.    Discussed   L partial mastectomy, Sentinal node Bx, AND if needed and post op adjuvant Rad Rx.  Recurrence approximately 10% expected at breast/chest.    L mastectomy, Sentinal node Bx, and AND if needed,  With our Rad Rx gives recurrence risk locally 10%.    She wants L mastectomy and Sentinal node bx and AND if indicated.  Also R prophylactic mastectomy with  Bilateral reconstruction.  Estimated 10% risk of Ca recurrence on chest wall.    She is to see her surgeon, Dr. Castellon of  [facility] for surgery eval?    RTC 4 weeks.

## 2020-03-02 NOTE — TELEPHONE ENCOUNTER
Make an appt for her to see Dr. Rock Castellon with in   7-10 days for bilateral mastectomy, L sentinal node Bx and reconstruction.

## 2020-03-06 ENCOUNTER — TELEPHONE (OUTPATIENT)
Dept: HEMATOLOGY/ONCOLOGY | Facility: CLINIC | Age: 40
End: 2020-03-06

## 2020-03-06 DIAGNOSIS — Z17.1 MALIGNANT NEOPLASM OF OVERLAPPING SITES OF LEFT BREAST IN FEMALE, ESTROGEN RECEPTOR NEGATIVE: ICD-10-CM

## 2020-03-06 DIAGNOSIS — Z17.1 MALIGNANT NEOPLASM OF LOWER-OUTER QUADRANT OF LEFT BREAST OF FEMALE, ESTROGEN RECEPTOR NEGATIVE: Primary | ICD-10-CM

## 2020-03-06 DIAGNOSIS — C50.512 MALIGNANT NEOPLASM OF LOWER-OUTER QUADRANT OF LEFT BREAST OF FEMALE, ESTROGEN RECEPTOR NEGATIVE: Primary | ICD-10-CM

## 2020-03-06 DIAGNOSIS — C50.812 MALIGNANT NEOPLASM OF OVERLAPPING SITES OF LEFT BREAST IN FEMALE, ESTROGEN RECEPTOR NEGATIVE: ICD-10-CM

## 2020-03-06 NOTE — TELEPHONE ENCOUNTER
Dr. Castellon's office contacts new patients to schedule directly. Referral with records have been faxed. Pt notified of their policy and instructed to contact our office if she does not hear from Dr. Castellon's staff.

## 2020-03-18 ENCOUNTER — INFUSION (OUTPATIENT)
Dept: INFUSION THERAPY | Facility: HOSPITAL | Age: 40
End: 2020-03-18
Attending: INTERNAL MEDICINE
Payer: MEDICARE

## 2020-03-18 VITALS
WEIGHT: 248.69 LBS | DIASTOLIC BLOOD PRESSURE: 74 MMHG | BODY MASS INDEX: 42.69 KG/M2 | TEMPERATURE: 99 F | HEART RATE: 106 BPM | SYSTOLIC BLOOD PRESSURE: 116 MMHG | RESPIRATION RATE: 18 BRPM | OXYGEN SATURATION: 98 %

## 2020-03-18 DIAGNOSIS — Z17.1 MALIGNANT NEOPLASM OF OVERLAPPING SITES OF LEFT BREAST IN FEMALE, ESTROGEN RECEPTOR NEGATIVE: Primary | ICD-10-CM

## 2020-03-18 DIAGNOSIS — C50.812 MALIGNANT NEOPLASM OF OVERLAPPING SITES OF LEFT BREAST IN FEMALE, ESTROGEN RECEPTOR NEGATIVE: Primary | ICD-10-CM

## 2020-03-18 PROCEDURE — 96523 IRRIG DRUG DELIVERY DEVICE: CPT

## 2020-03-18 PROCEDURE — 63600175 PHARM REV CODE 636 W HCPCS: Performed by: INTERNAL MEDICINE

## 2020-03-18 PROCEDURE — A4216 STERILE WATER/SALINE, 10 ML: HCPCS | Performed by: INTERNAL MEDICINE

## 2020-03-18 PROCEDURE — 25000003 PHARM REV CODE 250: Performed by: INTERNAL MEDICINE

## 2020-03-18 RX ORDER — HEPARIN 100 UNIT/ML
500 SYRINGE INTRAVENOUS
Status: CANCELLED | OUTPATIENT
Start: 2020-03-18

## 2020-03-18 RX ORDER — SODIUM CHLORIDE 0.9 % (FLUSH) 0.9 %
10 SYRINGE (ML) INJECTION
Status: CANCELLED | OUTPATIENT
Start: 2020-03-18

## 2020-03-18 RX ORDER — SODIUM CHLORIDE 0.9 % (FLUSH) 0.9 %
10 SYRINGE (ML) INJECTION
Status: COMPLETED | OUTPATIENT
Start: 2020-03-18 | End: 2020-03-18

## 2020-03-18 RX ORDER — HEPARIN 100 UNIT/ML
500 SYRINGE INTRAVENOUS
Status: COMPLETED | OUTPATIENT
Start: 2020-03-18 | End: 2020-03-18

## 2020-03-18 RX ADMIN — HEPARIN 500 UNITS: 100 SYRINGE at 09:03

## 2020-03-18 RX ADMIN — SODIUM CHLORIDE, PRESERVATIVE FREE 10 ML: 5 INJECTION INTRAVENOUS at 09:03

## 2020-03-19 ENCOUNTER — TELEPHONE (OUTPATIENT)
Dept: HEMATOLOGY/ONCOLOGY | Facility: CLINIC | Age: 40
End: 2020-03-19

## 2020-03-19 NOTE — TELEPHONE ENCOUNTER
----- Message from Gayla Angel sent at 3/19/2020  9:23 AM CDT -----  Pt has been having a lot of joint pain and tylenol isn't helping. Pt wants to know if this is normal and if there is anything else she can take.    # [phone]

## 2020-03-19 NOTE — TELEPHONE ENCOUNTER
Spoke to pt regarding pain.  Dr. Serna feels that she may be having some arthritic pain.  Pt to see Dr. Blount regarding that. Pt has not had breast surgery at this time.  I will call the surgeon to see what is going on with that.

## 2020-03-25 ENCOUNTER — PATIENT MESSAGE (OUTPATIENT)
Dept: HEMATOLOGY/ONCOLOGY | Facility: CLINIC | Age: 40
End: 2020-03-25

## 2020-03-30 ENCOUNTER — PATIENT MESSAGE (OUTPATIENT)
Dept: HEMATOLOGY/ONCOLOGY | Facility: CLINIC | Age: 40
End: 2020-03-30

## 2020-04-03 ENCOUNTER — PATIENT MESSAGE (OUTPATIENT)
Dept: HEMATOLOGY/ONCOLOGY | Facility: CLINIC | Age: 40
End: 2020-04-03

## 2020-04-08 ENCOUNTER — TELEPHONE (OUTPATIENT)
Dept: HEMATOLOGY/ONCOLOGY | Facility: CLINIC | Age: 40
End: 2020-04-08

## 2020-04-08 ENCOUNTER — OFFICE VISIT (OUTPATIENT)
Dept: HEMATOLOGY/ONCOLOGY | Facility: CLINIC | Age: 40
End: 2020-04-08
Payer: MEDICARE

## 2020-04-08 DIAGNOSIS — C50.811 MALIGNANT NEOPLASM OF OVERLAPPING SITES OF RIGHT BREAST IN FEMALE, ESTROGEN RECEPTOR NEGATIVE: Primary | ICD-10-CM

## 2020-04-08 DIAGNOSIS — Z17.1 MALIGNANT NEOPLASM OF OVERLAPPING SITES OF RIGHT BREAST IN FEMALE, ESTROGEN RECEPTOR NEGATIVE: Primary | ICD-10-CM

## 2020-04-08 PROCEDURE — 99214 OFFICE O/P EST MOD 30 MIN: CPT | Mod: 95,,, | Performed by: INTERNAL MEDICINE

## 2020-04-08 PROCEDURE — 99214 PR OFFICE/OUTPT VISIT, EST, LEVL IV, 30-39 MIN: ICD-10-PCS | Mod: 95,,, | Performed by: INTERNAL MEDICINE

## 2020-04-08 NOTE — TELEPHONE ENCOUNTER
Spoke to [facility] imaging.  Pt will be scheduled for /S right breast on 4/9.

## 2020-04-09 ENCOUNTER — HOSPITAL ENCOUNTER (OUTPATIENT)
Dept: RADIOLOGY | Facility: HOSPITAL | Age: 40
Discharge: HOME | End: 2020-04-09
Attending: INTERNAL MEDICINE
Payer: MEDICARE

## 2020-04-09 ENCOUNTER — PATIENT MESSAGE (OUTPATIENT)
Dept: HEMATOLOGY/ONCOLOGY | Facility: CLINIC | Age: 40
End: 2020-04-09

## 2020-04-09 DIAGNOSIS — C50.811 MALIGNANT NEOPLASM OF OVERLAPPING SITES OF RIGHT BREAST IN FEMALE, ESTROGEN RECEPTOR NEGATIVE: ICD-10-CM

## 2020-04-09 DIAGNOSIS — Z17.1 MALIGNANT NEOPLASM OF OVERLAPPING SITES OF RIGHT BREAST IN FEMALE, ESTROGEN RECEPTOR NEGATIVE: ICD-10-CM

## 2020-04-09 PROCEDURE — 76642 ULTRASOUND BREAST LIMITED: CPT | Mod: TC,PO,LT

## 2020-04-11 NOTE — PROGRESS NOTES
[facility] Progress Note    S/P chemo x's 6 cycles  Neoadjuvant Rx. Now.  Echo ej fx NL.    EGD, colonoscopy 1/13/20.  Left Breast Cancer, triple negative.    L breast mamm and u/s 2/26/20.  Mamm/U/S mass effect 2.8 cm to 1 cm, improved L breast.  R breast with fat necrosis.    Considered getting a MRI, but she has CNS shunt in place, S/P surgery for AVM.    Discussed   L partial mastectomy, Sentinal node Bx, AND if needed and post op adjuvant Rad Rx.  Recurrence approximately 10% expected at breast/chest.    L mastectomy, Sentinal node Bx, and AND if needed,  With our Rad Rx gives recurrence risk locally 10%.    She wants L mastectomy and Sentinal node bx and AND if indicated.  Also R prophylactic mastectomy with  Bilateral reconstruction.  Estimated 10% risk of Ca recurrence on chest wall.    She is to see her surgeon, Dr. Castellon of  [facility]  for surgery eval?  No surgery done yet.  I discussed with Dr. Castellon to try facilitate surgery for her.  Appears no surgery to be done till Corona virus elective   Procedure deferral is called off.    PN sx have increased since completing chemo.  Joint pain sx increased. Cause?  Trial of lyrica 50mg 1 TID.    Port site with clear drainage expressed per pt.  Site has a tiny opening at skin.  NT, not reddened.  Apply bacitracin to site, apply bandage.    I do not palpate mass at L or R breast.  LN NP    Calf, Bones NT.    Check breast U/s to see if mass is increased or decreased?    Breast Cancer.  RTC 2 weeks.

## 2020-04-16 ENCOUNTER — PATIENT MESSAGE (OUTPATIENT)
Dept: HEMATOLOGY/ONCOLOGY | Facility: CLINIC | Age: 40
End: 2020-04-16

## 2020-04-22 ENCOUNTER — OFFICE VISIT (OUTPATIENT)
Dept: HEMATOLOGY/ONCOLOGY | Facility: CLINIC | Age: 40
End: 2020-04-22
Payer: MEDICARE

## 2020-04-22 DIAGNOSIS — Z17.1 MALIGNANT NEOPLASM OF LOWER-OUTER QUADRANT OF LEFT BREAST OF FEMALE, ESTROGEN RECEPTOR NEGATIVE: Primary | ICD-10-CM

## 2020-04-22 DIAGNOSIS — C50.512 MALIGNANT NEOPLASM OF LOWER-OUTER QUADRANT OF LEFT BREAST OF FEMALE, ESTROGEN RECEPTOR NEGATIVE: Primary | ICD-10-CM

## 2020-04-22 PROCEDURE — 99214 OFFICE O/P EST MOD 30 MIN: CPT | Mod: 95,,, | Performed by: INTERNAL MEDICINE

## 2020-04-22 PROCEDURE — 99214 PR OFFICE/OUTPT VISIT, EST, LEVL IV, 30-39 MIN: ICD-10-PCS | Mod: 95,,, | Performed by: INTERNAL MEDICINE

## 2020-04-24 DIAGNOSIS — Z03.818 ENCNTR FOR OBS FOR SUSP EXPSR TO OTH BIOLG AGENTS RULED OUT: Primary | ICD-10-CM

## 2020-04-26 NOTE — PROGRESS NOTES
[facility] audio video encounter Progress Note  April 22, 2020    She is in isolation at home because of the corona virus epidemic.    This is a audio video encounter in lieu of in-person encounter because of the corona virus emergency.  Patient acknowledges and consents to an audio video encounter as part of her care.      S/P chemo x's 6 cycles  Neoadjuvant Rx. Now.  Echo ej fx NL.    EGD, colonoscopy 1/13/20.  Left Breast Cancer, triple negative.    L breast mamm and u/s 2/26/20.  Mamm/U/S mass effect 2.8 cm to 1 cm, improved L breast.  R breast with fat necrosis.    Considered getting a MRI, but she has CNS shunt in place, S/P surgery for AVM.    Discussed   L partial mastectomy, Sentinal node Bx, AND if needed and post op adjuvant Rad Rx.  Recurrence approximately 10% expected at breast/chest.    L mastectomy, Sentinal node Bx, and AND if needed,  With our Rad Rx gives recurrence risk locally 10%.    She wants L mastectomy and Sentinal node bx and AND if indicated.  Also R prophylactic mastectomy with  Bilateral reconstruction.  Estimated 10% risk of Ca recurrence on chest wall.    She is to see her surgeon, Dr. [facility]ellon of  [facility]  for surgery eval?  No surgery done yet.  I discussed with Dr. Castellon to try facilitate surgery for her.  Appears no surgery to be done till Corona virus elective   Procedure deferral is called off.    PN sx have increased since completing chemo.  Joint pain sx increased. Cause?  Trial of lyrica 50mg 1 TID.    Port site with clear drainage expressed per pt.  Site has a tiny opening at skin.  NT, not reddened.  Apply bacitracin to site, apply bandage.    I do not palpate mass at L or R breast.  LN NP    Calf, Bones NT.    Check breast U/s to see if mass is increased or decreased?    The ultrasound showed that the breast mass had resolved and was not seen on the ultrasound study.  This is a very good response to the 6 cycles of neoadjuvant chemotherapy.    Hopefully she can  have her surgery completed per Dr. Castellon soon, now that the restrictions regarding elective operations are beginning to loosen from the corona virus epidemic.    Return to clinic 1 month.

## 2020-04-27 ENCOUNTER — LAB VISIT (OUTPATIENT)
Dept: INFUSION THERAPY | Facility: HOSPITAL | Age: 40
End: 2020-04-27
Attending: NURSE PRACTITIONER
Payer: MEDICARE

## 2020-04-27 DIAGNOSIS — Z03.818 ENCNTR FOR OBS FOR SUSP EXPSR TO OTH BIOLG AGENTS RULED OUT: ICD-10-CM

## 2020-04-27 PROCEDURE — U0002 COVID-19 LAB TEST NON-CDC: HCPCS

## 2020-04-28 LAB — SARS-COV-2 RNA RESP QL NAA+PROBE: NOT DETECTED

## 2020-04-29 ENCOUNTER — INFUSION (OUTPATIENT)
Dept: INFUSION THERAPY | Facility: HOSPITAL | Age: 40
End: 2020-04-29
Attending: INTERNAL MEDICINE
Payer: MEDICARE

## 2020-04-29 VITALS
HEART RATE: 83 BPM | TEMPERATURE: 98 F | RESPIRATION RATE: 17 BRPM | BODY MASS INDEX: 41.45 KG/M2 | DIASTOLIC BLOOD PRESSURE: 84 MMHG | WEIGHT: 241.5 LBS | SYSTOLIC BLOOD PRESSURE: 120 MMHG

## 2020-04-29 DIAGNOSIS — Z17.1 MALIGNANT NEOPLASM OF OVERLAPPING SITES OF LEFT BREAST IN FEMALE, ESTROGEN RECEPTOR NEGATIVE: Primary | ICD-10-CM

## 2020-04-29 DIAGNOSIS — C50.812 MALIGNANT NEOPLASM OF OVERLAPPING SITES OF LEFT BREAST IN FEMALE, ESTROGEN RECEPTOR NEGATIVE: Primary | ICD-10-CM

## 2020-04-29 PROCEDURE — 96523 IRRIG DRUG DELIVERY DEVICE: CPT

## 2020-04-29 PROCEDURE — 25000003 PHARM REV CODE 250: Performed by: INTERNAL MEDICINE

## 2020-04-29 PROCEDURE — 63600175 PHARM REV CODE 636 W HCPCS: Performed by: INTERNAL MEDICINE

## 2020-04-29 PROCEDURE — A4216 STERILE WATER/SALINE, 10 ML: HCPCS | Performed by: INTERNAL MEDICINE

## 2020-04-29 RX ORDER — SODIUM CHLORIDE 0.9 % (FLUSH) 0.9 %
10 SYRINGE (ML) INJECTION
Status: COMPLETED | OUTPATIENT
Start: 2020-04-29 | End: 2020-04-29

## 2020-04-29 RX ORDER — HEPARIN 100 UNIT/ML
500 SYRINGE INTRAVENOUS
Status: CANCELLED | OUTPATIENT
Start: 2020-04-29

## 2020-04-29 RX ORDER — SODIUM CHLORIDE 0.9 % (FLUSH) 0.9 %
10 SYRINGE (ML) INJECTION
Status: CANCELLED | OUTPATIENT
Start: 2020-04-29

## 2020-04-29 RX ORDER — HEPARIN 100 UNIT/ML
500 SYRINGE INTRAVENOUS
Status: COMPLETED | OUTPATIENT
Start: 2020-04-29 | End: 2020-04-29

## 2020-04-29 RX ADMIN — SODIUM CHLORIDE, PRESERVATIVE FREE 10 ML: 5 INJECTION INTRAVENOUS at 10:04

## 2020-04-29 RX ADMIN — HEPARIN 500 UNITS: 100 SYRINGE at 10:04

## 2020-05-07 ENCOUNTER — PATIENT MESSAGE (OUTPATIENT)
Dept: HEMATOLOGY/ONCOLOGY | Facility: CLINIC | Age: 40
End: 2020-05-07

## 2020-05-24 ENCOUNTER — TELEPHONE (OUTPATIENT)
Dept: HEMATOLOGY/ONCOLOGY | Facility: CLINIC | Age: 40
End: 2020-05-24

## 2020-05-25 NOTE — TELEPHONE ENCOUNTER
Call Dr. Castellon's office to get the date of her surgery for   Breast Cancer.  Give me the date.

## 2020-05-26 ENCOUNTER — TELEPHONE (OUTPATIENT)
Dept: HEMATOLOGY/ONCOLOGY | Facility: CLINIC | Age: 40
End: 2020-05-26

## 2020-05-27 ENCOUNTER — TELEPHONE (OUTPATIENT)
Dept: HEMATOLOGY/ONCOLOGY | Facility: CLINIC | Age: 40
End: 2020-05-27

## 2020-05-27 ENCOUNTER — OFFICE VISIT (OUTPATIENT)
Dept: HEMATOLOGY/ONCOLOGY | Facility: CLINIC | Age: 40
End: 2020-05-27
Payer: MEDICARE

## 2020-05-27 DIAGNOSIS — Z17.1 MALIGNANT NEOPLASM OF OVERLAPPING SITES OF LEFT BREAST IN FEMALE, ESTROGEN RECEPTOR NEGATIVE: Primary | ICD-10-CM

## 2020-05-27 DIAGNOSIS — C50.812 MALIGNANT NEOPLASM OF OVERLAPPING SITES OF LEFT BREAST IN FEMALE, ESTROGEN RECEPTOR NEGATIVE: Primary | ICD-10-CM

## 2020-05-27 PROCEDURE — 99214 OFFICE O/P EST MOD 30 MIN: CPT | Mod: 95,,, | Performed by: INTERNAL MEDICINE

## 2020-05-27 PROCEDURE — 99214 PR OFFICE/OUTPT VISIT, EST, LEVL IV, 30-39 MIN: ICD-10-PCS | Mod: 95,,, | Performed by: INTERNAL MEDICINE

## 2020-05-27 NOTE — TELEPHONE ENCOUNTER
----- Message from Danielle Hurt, Patient Care Assistant sent at 5/21/2020  1:50 PM CDT -----  Jaci , patient called in stating she need to speak to you concerning what doctor should she go to for her Tremors and neuropathy . She can be reached at [phone]

## 2020-05-31 NOTE — PROGRESS NOTES
[facility] audio video encounter Progress Note  May 27, 2020    She is in isolation at home because of the corona virus epidemic.    This is a audio video encounter in lieu of in-person encounter because of the corona virus emergency.  Patient acknowledges and consents to an audio video encounter as part of her care.    She had a pen hold size break in the skin near her stewart catheter.  She saw Dr. Elkins and he was able to express fluid from the site.  The culture was positive and he has started her on antibiotics.  He feels that the port needs to be removed at the time of her breast surgery and she will have this done next week.    She confirms that she wants to have bilateral mastectomy with reconstruction and she is scheduled for elective surgery on June 4th.    S/P chemo x's 6 cycles  Neoadjuvant Rx. Now.  Echo ej fx NL.    EGD, colonoscopy 1/13/20.  Left Breast Cancer, triple negative.    L breast mamm and u/s 2/26/20.  Mamm/U/S mass effect 2.8 cm to 1 cm, improved L breast.  R breast with fat necrosis.    Considered getting a MRI, but she has CNS shunt in place, S/P surgery for AVM.    Discussed   L partial mastectomy, Sentinal node Bx, AND if needed and post op adjuvant Rad Rx.  Recurrence approximately 10% expected at breast/chest.    L mastectomy, Sentinal node Bx, and AND if needed,  With our Rad Rx gives recurrence risk locally 10%.    She wants L mastectomy and Sentinal node bx and AND if indicated.  Also R prophylactic mastectomy with  Bilateral reconstruction.  Estimated 10% risk of Ca recurrence on chest wall.    She is to see her surgeon, Dr. [facility]ellon of  [facility]  for surgery eval?  No surgery done yet.  I discussed with Dr. Castellon to try facilitate surgery for her.  Appears no surgery to be done till Corona virus elective   Procedure deferral is called off.    PN sx have increased since completing chemo.  Joint pain sx increased. Cause?  Trial of lyrica 50mg 1 TID.    Port site with clear  drainage expressed per pt.  Site has a tiny opening at skin.  NT, not reddened.  Apply bacitracin to site, apply bandage.    I do not palpate mass at L or R breast.  LN NP    Calf, Bones NT.    Check breast U/s to see if mass is increased or decreased?    The ultrasound showed that the breast mass had resolved and was not seen on the ultrasound study.  This is a very good response to the 6 cycles of neoadjuvant chemotherapy.    Hopefully she can have her surgery completed per Dr. Castellon soon, now that the restrictions regarding elective operations are beginning to loosen from the corona virus epidemic.  Surgery scheduled for June 4, 2020.    Return to clinic 1 month.

## 2020-06-05 ENCOUNTER — TELEPHONE (OUTPATIENT)
Dept: INFUSION THERAPY | Facility: HOSPITAL | Age: 40
End: 2020-06-05

## 2020-06-29 ENCOUNTER — TELEPHONE (OUTPATIENT)
Dept: HEMATOLOGY/ONCOLOGY | Facility: CLINIC | Age: 40
End: 2020-06-29

## 2020-06-29 ENCOUNTER — OFFICE VISIT (OUTPATIENT)
Dept: HEMATOLOGY/ONCOLOGY | Facility: CLINIC | Age: 40
End: 2020-06-29
Payer: MEDICARE

## 2020-06-29 DIAGNOSIS — Z17.1 MALIGNANT NEOPLASM OF OVERLAPPING SITES OF LEFT BREAST IN FEMALE, ESTROGEN RECEPTOR NEGATIVE: Primary | ICD-10-CM

## 2020-06-29 DIAGNOSIS — S92.345A NONDISPLACED FRACTURE OF FOURTH METATARSAL BONE, LEFT FOOT, INITIAL ENCOUNTER FOR CLOSED FRACTURE: ICD-10-CM

## 2020-06-29 DIAGNOSIS — Z86.79 HISTORY OF MITRAL VALVE PROLAPSE: ICD-10-CM

## 2020-06-29 DIAGNOSIS — C50.812 MALIGNANT NEOPLASM OF OVERLAPPING SITES OF LEFT BREAST IN FEMALE, ESTROGEN RECEPTOR NEGATIVE: Primary | ICD-10-CM

## 2020-06-29 PROCEDURE — 99214 OFFICE O/P EST MOD 30 MIN: CPT | Mod: 95,,, | Performed by: INTERNAL MEDICINE

## 2020-06-29 PROCEDURE — 99214 PR OFFICE/OUTPT VISIT, EST, LEVL IV, 30-39 MIN: ICD-10-PCS | Mod: 95,,, | Performed by: INTERNAL MEDICINE

## 2020-06-30 NOTE — PROGRESS NOTES
[facility] audio video encounter Progress Note  June 29, 2020    She is in isolation at home because of the corona virus epidemic.    This is a audio video encounter in lieu of in-person encounter because of the corona virus emergency.  Patient acknowledges and consents to an audio video encounter as part of her care.    C/O:  breast cancer follow up      She had bilateral mastectomy with flap reconstruction surgery on June 4th, 2020.  She has 1 drain left in place.  Has suspected seroma under axilla.  To follow up with surgeon for re stalin,   Dr. Castellon.  S/P chemo x's 6 cycles  Neoadjuvant Rx.   Echo ej fx NL.    EGD, colonoscopy 1/13/20.  Left Breast Cancer, triple negative.    L breast mamm and u/s 2/26/20.  Mamm/U/S mass effect 2.8 cm to 1 cm, improved L breast.  R breast with fat necrosis.    Considered getting a MRI, but she has CNS shunt in place, S/P surgery for AVM.    Discussed   L partial mastectomy, Sentinal node Bx, AND if needed and post op adjuvant Rad Rx.  Recurrence approximately 10% expected at breast/chest.    L mastectomy, Sentinal node Bx, and AND if needed,  With our Rad Rx gives recurrence risk locally 10%.    She wants L mastectomy and Sentinal node bx and AND if indicated.  Also R prophylactic mastectomy with  Bilateral reconstruction.  Estimated 10% risk of Ca recurrence on chest wall.    PN sx have increased since completing chemo.  Joint pain sx increased. Cause?  Trial of lyrica 50mg 1 TID.    The ultrasound showed that the breast mass had resolved and was not seen on the ultrasound study.  This is a very good response to the 6 cycles of neoadjuvant chemotherapy.  Pre op u/s.    She told me CRISTIAN in path report.  Will call [facility] for path report from 6/4/20.    RTC 1 month for discussion regards Arimidex, Tamoxifen.

## 2020-07-22 ENCOUNTER — TELEPHONE (OUTPATIENT)
Dept: HEMATOLOGY/ONCOLOGY | Facility: CLINIC | Age: 40
End: 2020-07-22

## 2020-07-22 NOTE — TELEPHONE ENCOUNTER
----- Message from Danielle Hurt, Patient Care Assistant sent at 7/22/2020  2:10 PM CDT -----  Patient called in stating she would like to know if Dr. Serna could call her in a antibiotic due to her having sinus issues and a cough . She can be reached at [phone]

## 2020-07-28 ENCOUNTER — OFFICE VISIT (OUTPATIENT)
Dept: HEMATOLOGY/ONCOLOGY | Facility: CLINIC | Age: 40
End: 2020-07-28
Payer: MEDICARE

## 2020-07-28 VITALS
SYSTOLIC BLOOD PRESSURE: 107 MMHG | RESPIRATION RATE: 19 BRPM | HEART RATE: 97 BPM | TEMPERATURE: 97 F | BODY MASS INDEX: 40.66 KG/M2 | WEIGHT: 236.88 LBS | DIASTOLIC BLOOD PRESSURE: 72 MMHG

## 2020-07-28 DIAGNOSIS — Z17.1 MALIGNANT NEOPLASM OF OVERLAPPING SITES OF LEFT BREAST IN FEMALE, ESTROGEN RECEPTOR NEGATIVE: Primary | ICD-10-CM

## 2020-07-28 DIAGNOSIS — C50.812 MALIGNANT NEOPLASM OF OVERLAPPING SITES OF LEFT BREAST IN FEMALE, ESTROGEN RECEPTOR NEGATIVE: Primary | ICD-10-CM

## 2020-07-28 PROCEDURE — 99214 PR OFFICE/OUTPT VISIT, EST, LEVL IV, 30-39 MIN: ICD-10-PCS | Mod: S$GLB,,, | Performed by: INTERNAL MEDICINE

## 2020-07-28 PROCEDURE — 99214 OFFICE O/P EST MOD 30 MIN: CPT | Mod: S$GLB,,, | Performed by: INTERNAL MEDICINE

## 2020-07-28 RX ORDER — DULOXETIN HYDROCHLORIDE 60 MG/1
60 CAPSULE, DELAYED RELEASE ORAL DAILY
COMMUNITY

## 2020-07-28 RX ORDER — PAROXETINE 10 MG/1
10 TABLET, FILM COATED ORAL EVERY MORNING
COMMUNITY
End: 2021-11-05 | Stop reason: ALTCHOICE

## 2020-07-28 NOTE — PROGRESS NOTES
[facility] hematology Oncology in office encounter progress note    July 28th 2020      C/O:  breast cancer follow up      She had bilateral mastectomy with flap reconstruction surgery on June 4th, 2020.   Has suspected seroma under axilla.  To follow up with surgeon for re stalin,   Dr. Castellon.  S/P chemo x's 6 cycles  Neoadjuvant Rx.   Echo ej fx NL.    EGD, colonoscopy 1/13/20.  Left Breast Cancer, triple negative.    L breast mamm and u/s 2/26/20.  Mamm/U/S mass effect 2.8 cm to 1 cm, improved L breast.  R breast with fat necrosis.    Considered getting a MRI, but she has CNS shunt in place, S/P surgery for AVM.    Discussed   L partial mastectomy, Sentinal node Bx, AND if needed and post op adjuvant Rad Rx.  Recurrence approximately 10% expected at breast/chest.    L mastectomy, Sentinal node Bx, and AND if needed,  With our Rad Rx gives recurrence risk locally 10%.    She wants L mastectomy and Sentinal node bx and AND if indicated.  Also R prophylactic mastectomy with  Bilateral reconstruction.  Estimated 10% risk of Ca recurrence on chest wall.    PN sx have increased since completing chemo.  Joint pain sx increased. Cause?  Trial of lyrica 50mg 1 TID.    The ultrasound showed that the breast mass had resolved and was not seen on the ultrasound study.  This is a very good response to the 6 cycles of neoadjuvant chemotherapy.  Pre op u/s.    She told me CRISTIAN in path report.  Original pathology report from [facility] was triple negative.  [facility] showed no evidence of disease in the left or right breast or in the sentinel node biopsy.    She achieved complete remission status with the neoadjuvant chemotherapy x6 cycles.    On examination today examination today, she does not have palpable lymphadenopathy at the cervical, supraclavicular, or axillary areas at the left or right.    The left and right breast shows extensive postoperative change  however the incisions are closed and healing, the areas are nontender without palpable mass, ecchymoses have resolved.  The abdominal area shows that the anterior incision is closed,  well-healed, nontender without ecchymoses.    She is to follow-up with the plastic surgery for additional surgery at the breast and abdominal areas to get a better look.    She had bilateral mastectomy with reconstruction.  I would estimate her risk of developing new primary breast cancer at 3% when followed over the next many years.  The original breast cancer was triple negative.  I have not recommended tamoxifen or Arimidex adjuvantly or preventively for her.  I have recommended that we go with observation for now and she will follow up here in 3 months.

## 2020-11-04 ENCOUNTER — OFFICE VISIT (OUTPATIENT)
Dept: HEMATOLOGY/ONCOLOGY | Facility: CLINIC | Age: 40
End: 2020-11-04
Payer: MEDICARE

## 2020-11-04 VITALS
SYSTOLIC BLOOD PRESSURE: 127 MMHG | WEIGHT: 227 LBS | TEMPERATURE: 98 F | DIASTOLIC BLOOD PRESSURE: 91 MMHG | HEART RATE: 96 BPM | BODY MASS INDEX: 38.96 KG/M2

## 2020-11-04 DIAGNOSIS — Z17.1 MALIGNANT NEOPLASM OF OVERLAPPING SITES OF RIGHT BREAST IN FEMALE, ESTROGEN RECEPTOR NEGATIVE: ICD-10-CM

## 2020-11-04 DIAGNOSIS — M25.511 SHOULDER JOINT PAINFUL ON MOVEMENT, RIGHT: Primary | ICD-10-CM

## 2020-11-04 DIAGNOSIS — C50.811 MALIGNANT NEOPLASM OF OVERLAPPING SITES OF RIGHT BREAST IN FEMALE, ESTROGEN RECEPTOR NEGATIVE: ICD-10-CM

## 2020-11-04 PROCEDURE — 99214 PR OFFICE/OUTPT VISIT, EST, LEVL IV, 30-39 MIN: ICD-10-PCS | Mod: S$GLB,,, | Performed by: INTERNAL MEDICINE

## 2020-11-04 PROCEDURE — 99214 OFFICE O/P EST MOD 30 MIN: CPT | Mod: S$GLB,,, | Performed by: INTERNAL MEDICINE

## 2020-11-04 NOTE — PROGRESS NOTES
[facility] hematology Oncology in office encounter progress note    November 4, 2020      C/O:  breast cancer follow up      She had bilateral mastectomy with flap reconstruction surgery on June 4th, 2020.   Dr. Castellon.  S/P chemo x's 6 cycles  Neoadjuvant Rx.   Echo ej fx NL.    EGD, colonoscopy 1/13/20.  Left Breast Cancer, triple negative.    L breast mamm and u/s 2/26/20.  Mamm/U/S mass effect 2.8 cm to 1 cm, improved L breast.  R breast with fat necrosis.    Considered getting a MRI, but she has CNS shunt in place, S/P surgery for AVM.    She had L mastectomy and Sentinal node bx.  Also R prophylactic mastectomy with  Bilateral reconstruction.  Estimated 10% risk of Ca recurrence on chest wall.    PN sx have increased since completing chemo.  Joint pain sx increased. Cause?  Trial of lyrica 50mg 1 TID.    The ultrasound showed that the breast mass had resolved and was not seen on the ultrasound study.  This is a very good response to the 6 cycles of neoadjuvant chemotherapy.  Pre op u/s.    She told me CRISTIAN in path report.  Original pathology report from [facility] was triple negative.  [facility] showed no evidence of disease in the left or right breast or in the sentinel node biopsy.    She achieved complete remission status with the neoadjuvant chemotherapy x6 cycles.    On examination today examination today, she does not have palpable lymphadenopathy at the cervical, supraclavicular, or axillary areas at the left or right.    The left and right breast shows extensive postoperative change however the incisions are closed and healing, the areas are nontender without palpable mass, ecchymoses have resolved.  The abdominal area shows that the anterior incision is closed,  well-healed, nontender without ecchymoses.    She complains of decreased range of motion and pain at the right shoulder area  since the time of her surgery.  She is going to  [facility] for an x-ray of her right shoulder and will be getting her breast cancer markers and CBC and CMP completed at that time as well.    She had bilateral mastectomy with reconstruction.  I would estimate her risk of developing new primary breast cancer at 3% when followed over the next many years.  The original breast cancer was triple negative.  I have not recommended tamoxifen or Arimidex adjuvantly or preventively for her.     She returns to clinic here in 2 weeks, can cancel.  She returns to clinic here in March 2021.

## 2020-11-17 ENCOUNTER — TELEPHONE (OUTPATIENT)
Dept: HEMATOLOGY/ONCOLOGY | Facility: CLINIC | Age: 40
End: 2020-11-17

## 2020-11-17 NOTE — TELEPHONE ENCOUNTER
----- Message from Juli Franklin sent at 11/17/2020 10:10 AM CST -----  The patient wants to know if she needs to keep her appointment tomorrow in [place]. [phone]

## 2021-03-02 ENCOUNTER — TELEPHONE (OUTPATIENT)
Dept: HEMATOLOGY/ONCOLOGY | Facility: CLINIC | Age: 41
End: 2021-03-02

## 2021-03-17 ENCOUNTER — OFFICE VISIT (OUTPATIENT)
Dept: HEMATOLOGY/ONCOLOGY | Facility: CLINIC | Age: 41
End: 2021-03-17
Payer: MEDICARE

## 2021-03-17 VITALS
DIASTOLIC BLOOD PRESSURE: 74 MMHG | SYSTOLIC BLOOD PRESSURE: 117 MMHG | TEMPERATURE: 98 F | WEIGHT: 225 LBS | BODY MASS INDEX: 38.62 KG/M2 | HEART RATE: 102 BPM

## 2021-03-17 DIAGNOSIS — Z17.1 MALIGNANT NEOPLASM OF OVERLAPPING SITES OF RIGHT BREAST IN FEMALE, ESTROGEN RECEPTOR NEGATIVE: Primary | ICD-10-CM

## 2021-03-17 DIAGNOSIS — D50.9 IRON DEFICIENCY ANEMIA, UNSPECIFIED IRON DEFICIENCY ANEMIA TYPE: ICD-10-CM

## 2021-03-17 DIAGNOSIS — D53.9 ANEMIA ASSOCIATED WITH NUTRITIONAL DEFICIENCY: ICD-10-CM

## 2021-03-17 DIAGNOSIS — C50.811 MALIGNANT NEOPLASM OF OVERLAPPING SITES OF RIGHT BREAST IN FEMALE, ESTROGEN RECEPTOR NEGATIVE: Primary | ICD-10-CM

## 2021-03-17 PROCEDURE — 99214 PR OFFICE/OUTPT VISIT, EST, LEVL IV, 30-39 MIN: ICD-10-PCS | Mod: S$GLB,,, | Performed by: INTERNAL MEDICINE

## 2021-03-17 PROCEDURE — 99214 OFFICE O/P EST MOD 30 MIN: CPT | Mod: S$GLB,,, | Performed by: INTERNAL MEDICINE

## 2021-05-07 ENCOUNTER — TELEPHONE (OUTPATIENT)
Dept: HEMATOLOGY/ONCOLOGY | Facility: CLINIC | Age: 41
End: 2021-05-07

## 2021-05-12 DIAGNOSIS — N91.0 DELAYED MENSES: Primary | ICD-10-CM

## 2021-05-13 ENCOUNTER — TELEPHONE (OUTPATIENT)
Dept: HEMATOLOGY/ONCOLOGY | Facility: CLINIC | Age: 41
End: 2021-05-13

## 2021-06-08 RX ORDER — METOPROLOL SUCCINATE 25 MG/1
25 TABLET, EXTENDED RELEASE ORAL DAILY
Qty: 90 TABLET | Refills: 3 | Status: SHIPPED | OUTPATIENT
Start: 2021-06-08 | End: 2023-01-17 | Stop reason: SDUPTHER

## 2021-07-15 ENCOUNTER — OFFICE VISIT (OUTPATIENT)
Dept: HEMATOLOGY/ONCOLOGY | Facility: CLINIC | Age: 41
End: 2021-07-15
Payer: MEDICARE

## 2021-07-15 ENCOUNTER — TELEPHONE (OUTPATIENT)
Dept: HEMATOLOGY/ONCOLOGY | Facility: HOSPITAL | Age: 41
End: 2021-07-15

## 2021-07-15 VITALS
TEMPERATURE: 98 F | BODY MASS INDEX: 38.28 KG/M2 | HEART RATE: 86 BPM | DIASTOLIC BLOOD PRESSURE: 87 MMHG | WEIGHT: 223 LBS | SYSTOLIC BLOOD PRESSURE: 135 MMHG

## 2021-07-15 DIAGNOSIS — D50.0 IRON DEFICIENCY ANEMIA DUE TO CHRONIC BLOOD LOSS: Primary | ICD-10-CM

## 2021-07-15 DIAGNOSIS — D50.0 IRON DEFICIENCY ANEMIA DUE TO CHRONIC BLOOD LOSS: ICD-10-CM

## 2021-07-15 PROCEDURE — 99214 OFFICE O/P EST MOD 30 MIN: CPT | Mod: S$GLB,,, | Performed by: INTERNAL MEDICINE

## 2021-07-15 PROCEDURE — 99214 PR OFFICE/OUTPT VISIT, EST, LEVL IV, 30-39 MIN: ICD-10-PCS | Mod: S$GLB,,, | Performed by: INTERNAL MEDICINE

## 2021-07-15 RX ORDER — METHYLPREDNISOLONE SOD SUCC 125 MG
125 VIAL (EA) INJECTION ONCE AS NEEDED
Status: CANCELLED | OUTPATIENT
Start: 2021-07-21

## 2021-07-15 RX ORDER — SODIUM CHLORIDE 0.9 % (FLUSH) 0.9 %
10 SYRINGE (ML) INJECTION
Status: CANCELLED | OUTPATIENT
Start: 2021-07-21

## 2021-07-15 RX ORDER — SODIUM CHLORIDE 9 MG/ML
INJECTION, SOLUTION INTRAVENOUS CONTINUOUS
Status: CANCELLED | OUTPATIENT
Start: 2021-07-21

## 2021-07-15 RX ORDER — HEPARIN 100 UNIT/ML
5 SYRINGE INTRAVENOUS
Status: CANCELLED | OUTPATIENT
Start: 2021-07-21

## 2021-07-15 RX ORDER — DIPHENHYDRAMINE HYDROCHLORIDE 50 MG/ML
50 INJECTION INTRAMUSCULAR; INTRAVENOUS ONCE AS NEEDED
Status: CANCELLED | OUTPATIENT
Start: 2021-07-21

## 2021-07-15 RX ORDER — EPINEPHRINE 0.3 MG/.3ML
0.3 INJECTION SUBCUTANEOUS ONCE AS NEEDED
Status: CANCELLED | OUTPATIENT
Start: 2021-07-21

## 2021-10-27 ENCOUNTER — TELEPHONE (OUTPATIENT)
Dept: RADIOLOGY | Facility: HOSPITAL | Age: 41
End: 2021-10-27
Payer: MEDICARE

## 2021-11-01 ENCOUNTER — TELEPHONE (OUTPATIENT)
Dept: RADIOLOGY | Facility: HOSPITAL | Age: 41
End: 2021-11-01
Payer: MEDICARE

## 2021-11-02 ENCOUNTER — TELEPHONE (OUTPATIENT)
Dept: RADIOLOGY | Facility: HOSPITAL | Age: 41
End: 2021-11-02
Payer: MEDICARE

## 2021-11-02 DIAGNOSIS — N63.0 LUMP OR MASS IN BREAST: Primary | ICD-10-CM

## 2021-11-02 DIAGNOSIS — T81.89XS WOUND, SURGICAL, NONHEALING, SEQUELA: ICD-10-CM

## 2021-11-02 RX ORDER — BUPROPION HYDROCHLORIDE 100 MG/1
100 TABLET, EXTENDED RELEASE ORAL DAILY
COMMUNITY

## 2021-11-02 RX ORDER — ZINC GLUCONATE 50 MG
50 TABLET ORAL DAILY
COMMUNITY
End: 2022-06-22

## 2021-11-03 ENCOUNTER — TELEPHONE (OUTPATIENT)
Dept: HEMATOLOGY/ONCOLOGY | Facility: CLINIC | Age: 41
End: 2021-11-03
Payer: MEDICARE

## 2021-11-03 ENCOUNTER — OFFICE VISIT (OUTPATIENT)
Dept: HEMATOLOGY/ONCOLOGY | Facility: CLINIC | Age: 41
End: 2021-11-03
Payer: MEDICARE

## 2021-11-03 ENCOUNTER — HOSPITAL ENCOUNTER (OUTPATIENT)
Dept: RADIOLOGY | Facility: HOSPITAL | Age: 41
Discharge: HOME | End: 2021-11-03
Attending: STUDENT IN AN ORGANIZED HEALTH CARE EDUCATION/TRAINING PROGRAM
Payer: MEDICARE

## 2021-11-03 VITALS
BODY MASS INDEX: 38.96 KG/M2 | SYSTOLIC BLOOD PRESSURE: 127 MMHG | WEIGHT: 227 LBS | DIASTOLIC BLOOD PRESSURE: 89 MMHG | HEART RATE: 76 BPM | TEMPERATURE: 99 F

## 2021-11-03 DIAGNOSIS — Z17.1 MALIGNANT NEOPLASM OF OVERLAPPING SITES OF RIGHT BREAST IN FEMALE, ESTROGEN RECEPTOR NEGATIVE: ICD-10-CM

## 2021-11-03 DIAGNOSIS — C50.811 MALIGNANT NEOPLASM OF OVERLAPPING SITES OF RIGHT BREAST IN FEMALE, ESTROGEN RECEPTOR NEGATIVE: ICD-10-CM

## 2021-11-03 DIAGNOSIS — D50.0 IRON DEFICIENCY ANEMIA DUE TO CHRONIC BLOOD LOSS: Primary | ICD-10-CM

## 2021-11-03 DIAGNOSIS — N63.0 LUMP OR MASS IN BREAST: ICD-10-CM

## 2021-11-03 PROCEDURE — 76642 ULTRASOUND BREAST LIMITED: CPT | Mod: TC,LT

## 2021-11-03 PROCEDURE — 99214 PR OFFICE/OUTPT VISIT, EST, LEVL IV, 30-39 MIN: ICD-10-PCS | Mod: S$GLB,,, | Performed by: INTERNAL MEDICINE

## 2021-11-03 PROCEDURE — 99214 OFFICE O/P EST MOD 30 MIN: CPT | Mod: S$GLB,,, | Performed by: INTERNAL MEDICINE

## 2021-11-05 ENCOUNTER — HOSPITAL ENCOUNTER (OUTPATIENT)
Dept: RADIOLOGY | Facility: HOSPITAL | Age: 41
Discharge: HOME | End: 2021-11-05
Attending: PHYSICIAN ASSISTANT
Payer: MEDICARE

## 2021-11-05 VITALS
OXYGEN SATURATION: 99 % | BODY MASS INDEX: 38.41 KG/M2 | HEIGHT: 64 IN | DIASTOLIC BLOOD PRESSURE: 75 MMHG | TEMPERATURE: 98 F | WEIGHT: 225 LBS | SYSTOLIC BLOOD PRESSURE: 122 MMHG | RESPIRATION RATE: 18 BRPM | HEART RATE: 72 BPM

## 2021-11-05 DIAGNOSIS — T81.89XS WOUND, SURGICAL, NONHEALING, SEQUELA: ICD-10-CM

## 2021-11-05 LAB
APTT PPP: 25.5 SEC (ref 23.3–35.1)
BASOPHILS # BLD AUTO: 0.02 K/UL (ref 0–0.2)
BASOPHILS NFR BLD: 0.3 % (ref 0–1.9)
DIFFERENTIAL METHOD: ABNORMAL
EOSINOPHIL # BLD AUTO: 0.1 K/UL (ref 0–0.5)
EOSINOPHIL NFR BLD: 1 % (ref 0–8)
ERYTHROCYTE [DISTWIDTH] IN BLOOD BY AUTOMATED COUNT: 13.2 % (ref 11.5–14.5)
HCT VFR BLD AUTO: 39.6 % (ref 37–48.5)
HGB BLD-MCNC: 13.2 G/DL (ref 12–16)
IMM GRANULOCYTES # BLD AUTO: 0.01 K/UL (ref 0–0.04)
IMM GRANULOCYTES NFR BLD AUTO: 0.1 % (ref 0–0.5)
INR PPP: 1
LYMPHOCYTES # BLD AUTO: 2.2 K/UL (ref 1–4.8)
LYMPHOCYTES NFR BLD: 30.8 % (ref 18–48)
MCH RBC QN AUTO: 32.3 PG (ref 27–31)
MCHC RBC AUTO-ENTMCNC: 33.3 G/DL (ref 32–36)
MCV RBC AUTO: 97 FL (ref 82–98)
MONOCYTES # BLD AUTO: 0.6 K/UL (ref 0.3–1)
MONOCYTES NFR BLD: 9 % (ref 4–15)
NEUTROPHILS # BLD AUTO: 4.1 K/UL (ref 1.8–7.7)
NEUTROPHILS NFR BLD: 58.8 % (ref 38–73)
NRBC BLD-RTO: 0 /100 WBC
PLATELET # BLD AUTO: 264 K/UL (ref 150–450)
PMV BLD AUTO: 8.4 FL (ref 9.2–12.9)
PROTHROMBIN TIME: 12.5 SEC (ref 11.4–13.7)
RBC # BLD AUTO: 4.09 M/UL (ref 4–5.4)
SARS-COV-2 RDRP RESP QL NAA+PROBE: NEGATIVE
WBC # BLD AUTO: 6.97 K/UL (ref 3.9–12.7)

## 2021-11-05 PROCEDURE — 85730 THROMBOPLASTIN TIME PARTIAL: CPT | Performed by: RADIOLOGY

## 2021-11-05 PROCEDURE — 85610 PROTHROMBIN TIME: CPT | Performed by: RADIOLOGY

## 2021-11-05 PROCEDURE — 85025 COMPLETE CBC W/AUTO DIFF WBC: CPT | Performed by: RADIOLOGY

## 2021-11-05 PROCEDURE — 25000003 PHARM REV CODE 250: Performed by: RADIOLOGY

## 2021-11-05 PROCEDURE — U0002 COVID-19 LAB TEST NON-CDC: HCPCS | Performed by: RADIOLOGY

## 2021-11-05 PROCEDURE — A4357 BEDSIDE DRAINAGE BAG: HCPCS

## 2021-11-05 PROCEDURE — 63600175 PHARM REV CODE 636 W HCPCS: Performed by: RADIOLOGY

## 2021-11-05 PROCEDURE — 99152 MOD SED SAME PHYS/QHP 5/>YRS: CPT

## 2021-11-05 RX ORDER — MULTIVITAMIN
1 TABLET ORAL DAILY
COMMUNITY
End: 2022-12-27

## 2021-11-05 RX ORDER — MIDAZOLAM HYDROCHLORIDE 1 MG/ML
INJECTION INTRAMUSCULAR; INTRAVENOUS CODE/TRAUMA/SEDATION MEDICATION
Status: COMPLETED | OUTPATIENT
Start: 2021-11-05 | End: 2021-11-05

## 2021-11-05 RX ORDER — CHOLECALCIFEROL (VITAMIN D3) 25 MCG
1000 TABLET ORAL DAILY
COMMUNITY

## 2021-11-05 RX ORDER — CALCIUM CARBONATE/VITAMIN D3 500-10/5ML
400 LIQUID (ML) ORAL DAILY
COMMUNITY

## 2021-11-05 RX ORDER — SODIUM CHLORIDE 9 MG/ML
INJECTION, SOLUTION INTRAVENOUS
Status: COMPLETED | OUTPATIENT
Start: 2021-11-05 | End: 2021-11-05

## 2021-11-05 RX ORDER — FENTANYL CITRATE 50 UG/ML
INJECTION, SOLUTION INTRAMUSCULAR; INTRAVENOUS CODE/TRAUMA/SEDATION MEDICATION
Status: COMPLETED | OUTPATIENT
Start: 2021-11-05 | End: 2021-11-05

## 2021-11-05 RX ORDER — IBUPROFEN 100 MG/5ML
1000 SUSPENSION, ORAL (FINAL DOSE FORM) ORAL DAILY
COMMUNITY

## 2021-11-05 RX ORDER — LIDOCAINE HYDROCHLORIDE 10 MG/ML
INJECTION INFILTRATION; PERINEURAL CODE/TRAUMA/SEDATION MEDICATION
Status: COMPLETED | OUTPATIENT
Start: 2021-11-05 | End: 2021-11-05

## 2021-11-05 RX ADMIN — FENTANYL CITRATE 50 MCG: 50 INJECTION INTRAMUSCULAR; INTRAVENOUS at 09:11

## 2021-11-05 RX ADMIN — MIDAZOLAM HYDROCHLORIDE 2 MG: 1 INJECTION, SOLUTION INTRAMUSCULAR; INTRAVENOUS at 09:11

## 2021-11-05 RX ADMIN — LIDOCAINE HYDROCHLORIDE 8 ML: 10 INJECTION, SOLUTION INFILTRATION; PERINEURAL at 09:11

## 2021-11-05 RX ADMIN — SODIUM CHLORIDE 250 ML/HR: 0.9 INJECTION, SOLUTION INTRAVENOUS at 09:11

## 2021-12-03 ENCOUNTER — OFFICE VISIT (OUTPATIENT)
Dept: URGENT CARE | Facility: CLINIC | Age: 41
End: 2021-12-03
Payer: MEDICARE

## 2021-12-03 VITALS
HEIGHT: 64 IN | WEIGHT: 225 LBS | DIASTOLIC BLOOD PRESSURE: 85 MMHG | OXYGEN SATURATION: 99 % | TEMPERATURE: 98 F | SYSTOLIC BLOOD PRESSURE: 127 MMHG | BODY MASS INDEX: 38.41 KG/M2 | RESPIRATION RATE: 16 BRPM

## 2021-12-03 DIAGNOSIS — S62.201A CLOSED FRACTURE OF FIRST METACARPAL BONE OF RIGHT HAND, UNSPECIFIED FRACTURE MORPHOLOGY, UNSPECIFIED PORTION OF METACARPAL, INITIAL ENCOUNTER: ICD-10-CM

## 2021-12-03 DIAGNOSIS — M79.641 RIGHT HAND PAIN: Primary | ICD-10-CM

## 2021-12-03 PROCEDURE — 29126 PR APPLY FOREARM SPLINT,DYNAMIC: ICD-10-PCS | Mod: RT,S$GLB,, | Performed by: STUDENT IN AN ORGANIZED HEALTH CARE EDUCATION/TRAINING PROGRAM

## 2021-12-03 PROCEDURE — 99204 PR OFFICE/OUTPT VISIT, NEW, LEVL IV, 45-59 MIN: ICD-10-PCS | Mod: 25,S$GLB,, | Performed by: STUDENT IN AN ORGANIZED HEALTH CARE EDUCATION/TRAINING PROGRAM

## 2021-12-03 PROCEDURE — 99204 OFFICE O/P NEW MOD 45 MIN: CPT | Mod: 25,S$GLB,, | Performed by: STUDENT IN AN ORGANIZED HEALTH CARE EDUCATION/TRAINING PROGRAM

## 2021-12-03 PROCEDURE — 29126 APPL SHORT ARM SPLINT DYN: CPT | Mod: RT,S$GLB,, | Performed by: STUDENT IN AN ORGANIZED HEALTH CARE EDUCATION/TRAINING PROGRAM

## 2021-12-03 RX ORDER — NAPROXEN 500 MG/1
500 TABLET ORAL 2 TIMES DAILY
Qty: 30 TABLET | Refills: 0 | Status: SHIPPED | OUTPATIENT
Start: 2021-12-03 | End: 2021-12-03

## 2021-12-03 RX ORDER — NAPROXEN 500 MG/1
500 TABLET ORAL 2 TIMES DAILY
Qty: 30 TABLET | Refills: 0 | Status: SHIPPED | OUTPATIENT
Start: 2021-12-03 | End: 2021-12-17

## 2021-12-15 DIAGNOSIS — M79.641 RIGHT HAND PAIN: Primary | ICD-10-CM

## 2022-01-31 ENCOUNTER — TELEPHONE (OUTPATIENT)
Dept: HEMATOLOGY/ONCOLOGY | Facility: CLINIC | Age: 42
End: 2022-01-31
Payer: MEDICARE

## 2022-01-31 DIAGNOSIS — D50.0 IRON DEFICIENCY ANEMIA DUE TO CHRONIC BLOOD LOSS: Primary | ICD-10-CM

## 2022-01-31 DIAGNOSIS — Z17.1 MALIGNANT NEOPLASM OF OVERLAPPING SITES OF RIGHT BREAST IN FEMALE, ESTROGEN RECEPTOR NEGATIVE: ICD-10-CM

## 2022-01-31 DIAGNOSIS — C50.811 MALIGNANT NEOPLASM OF OVERLAPPING SITES OF RIGHT BREAST IN FEMALE, ESTROGEN RECEPTOR NEGATIVE: ICD-10-CM

## 2022-01-31 NOTE — TELEPHONE ENCOUNTER
----- Message from Danielle Hurt, Patient Care Assistant sent at 1/31/2022  2:37 PM CST -----  Regarding: Labs  Patient called in stating she would like her lab orders sent to [facility].  # [phone]

## 2022-02-02 ENCOUNTER — OFFICE VISIT (OUTPATIENT)
Dept: HEMATOLOGY/ONCOLOGY | Facility: CLINIC | Age: 42
End: 2022-02-02
Payer: MEDICARE

## 2022-02-02 VITALS
SYSTOLIC BLOOD PRESSURE: 123 MMHG | TEMPERATURE: 98 F | HEIGHT: 64 IN | BODY MASS INDEX: 38.58 KG/M2 | HEART RATE: 74 BPM | DIASTOLIC BLOOD PRESSURE: 73 MMHG | RESPIRATION RATE: 18 BRPM | WEIGHT: 226 LBS

## 2022-02-02 DIAGNOSIS — C50.512 MALIGNANT NEOPLASM OF LOWER-OUTER QUADRANT OF LEFT BREAST OF FEMALE, ESTROGEN RECEPTOR NEGATIVE: Primary | ICD-10-CM

## 2022-02-02 DIAGNOSIS — D50.9 IRON DEFICIENCY ANEMIA, UNSPECIFIED IRON DEFICIENCY ANEMIA TYPE: ICD-10-CM

## 2022-02-02 DIAGNOSIS — Z17.1 MALIGNANT NEOPLASM OF LOWER-OUTER QUADRANT OF LEFT BREAST OF FEMALE, ESTROGEN RECEPTOR NEGATIVE: Primary | ICD-10-CM

## 2022-02-02 PROCEDURE — 99214 PR OFFICE/OUTPT VISIT, EST, LEVL IV, 30-39 MIN: ICD-10-PCS | Mod: S$GLB,,, | Performed by: INTERNAL MEDICINE

## 2022-02-02 PROCEDURE — 99214 OFFICE O/P EST MOD 30 MIN: CPT | Mod: S$GLB,,, | Performed by: INTERNAL MEDICINE

## 2022-02-02 NOTE — PROGRESS NOTES
"  [facility] hematology Oncology in office subsequent encounter  note    2/2/22      C/O:  breast cancer follow up    Triple negative.  [place] adjuvant chemo x's 6.  Surgery, CRISTIAN.  Reconstructed.  Needs further surgery to "clean up" breast.  Hgb 10.  Ferritin decreased, heavy menses. Check stool for heme.  Discussed po Fe pills vs IV Fe infusion weekly x's 2 weeks.  She agrees to Fe infusions.  She has tolerated Fe po poorly in the past.  It is iron infusions were given and energy is improved.  Hemoglobin is normal at this time and ferritin is normal at this time.      She had bilateral mastectomy with flap reconstruction surgery on June 4th, 2020.   Dr. Castellon.  S/P chemo x's 6 cycles  Neoadjuvant Rx.   Echo ej fx NL.    She complains of tenderness at the inferior lateral aspect of her breast and tenderness at the inferior aspect of the right breast.  She had a seroma at L breast under management of plastic surgeon and is followed with intermittent ultrasound of the area.  It approximates 1-1/2 years since her major surgery.    She does have a [employer] shunt placed in 2006.  Will discuss with radiologist to see if MRI of breast is feasible in the setting of the shunt.    EGD, colonoscopy 1/13/20.  Left Breast Cancer, triple negative.    L breast mamm and u/s 2/26/20.  Mamm/U/S mass effect 2.8 cm to 1 cm, improved L breast.  R breast with fat necrosis.    Considered getting a MRI, but she has CNS shunt in place, S/P surgery for AVM.    She had L mastectomy and Sentinal node bx.  Also R prophylactic mastectomy with  Bilateral reconstruction.  Estimated 10% risk of Ca recurrence on chest wall.    PN sx have increased since completing chemo.  Joint pain sx increased. Cause?  Trial of lyrica 50mg 1 TID.    The ultrasound showed that the breast mass had resolved and was not seen on the ultrasound study.  This is a very good response to the 6 cycles of neoadjuvant chemotherapy.  Pre op u/s.    She " told me CRISTIAN in path report.  Original pathology report from [facility] was triple negative.  [facility] showed no evidence of disease in the left or right breast or in the sentinel node biopsy.    She achieved complete remission status with the neoadjuvant chemotherapy x6 cycles.    On examination today examination today, she does not have palpable lymphadenopathy at the cervical, supraclavicular, or axillary areas at the left or right.    The left and right breast shows extensive postoperative change however the incisions are closed and healing, the areas are nontender without palpable mass, ecchymoses have resolved.  The abdominal area shows that the anterior incision is closed,  well-healed, nontender without ecchymoses.    She had bilateral mastectomy with reconstruction.  I would estimate her risk of developing new primary breast cancer at 3% when followed over the next many years.  The original breast cancer was triple negative.  I have not recommended tamoxifen or Arimidex adjuvantly or preventively for her.     She had further surgery to get symmetrical appearance to the left and right breast .  The incision sites are closed and well healed and the breast are nontender  she does not have palpable mass at the left or right breast.  Her lungs are clear bilaterally, breathing is unlabored  She has regular rhythm without murmur, chest wall is nontender.  Abdomen is nontender without distention and liver and spleen are not palpable  She does not have CVA tenderness  Calves are nontender without edema or petechiae  Neurologically she is grossly intact.  She does not have palpable lymphadenopathy on exam    She had a hemoglobin at 10.  Ferritin 14.  Injectafer weekly x's 2, [facility].  Completed.  Check stools for heme.    Now Hgb better at 14.2 and Ferritin is improved at 155.  Observe for now.    RTC 4 months.  CBC, CMP, ferritin, CEA 4  months.

## 2022-03-20 ENCOUNTER — OFFICE VISIT (OUTPATIENT)
Dept: URGENT CARE | Facility: CLINIC | Age: 42
End: 2022-03-20
Payer: MEDICARE

## 2022-03-20 VITALS
OXYGEN SATURATION: 95 % | WEIGHT: 226 LBS | DIASTOLIC BLOOD PRESSURE: 86 MMHG | TEMPERATURE: 98 F | RESPIRATION RATE: 16 BRPM | BODY MASS INDEX: 38.79 KG/M2 | SYSTOLIC BLOOD PRESSURE: 120 MMHG | HEART RATE: 87 BPM

## 2022-03-20 DIAGNOSIS — L03.012 CELLULITIS OF FINGER OF LEFT HAND: Primary | ICD-10-CM

## 2022-03-20 PROCEDURE — 96372 THER/PROPH/DIAG INJ SC/IM: CPT | Mod: S$GLB,,, | Performed by: NURSE PRACTITIONER

## 2022-03-20 PROCEDURE — 96372 PR INJECTION,THERAP/PROPH/DIAG2ST, IM OR SUBCUT: ICD-10-PCS | Mod: S$GLB,,, | Performed by: NURSE PRACTITIONER

## 2022-03-20 PROCEDURE — 99213 OFFICE O/P EST LOW 20 MIN: CPT | Mod: 25,S$GLB,, | Performed by: EMERGENCY MEDICINE

## 2022-03-20 PROCEDURE — 99213 PR OFFICE/OUTPT VISIT, EST, LEVL III, 20-29 MIN: ICD-10-PCS | Mod: 25,S$GLB,, | Performed by: EMERGENCY MEDICINE

## 2022-03-20 RX ORDER — DOXYCYCLINE 100 MG/1
100 CAPSULE ORAL EVERY 12 HOURS
Qty: 14 CAPSULE | Refills: 0 | Status: SHIPPED | OUTPATIENT
Start: 2022-03-20 | End: 2022-03-27

## 2022-03-20 RX ORDER — DEXAMETHASONE SODIUM PHOSPHATE 4 MG/ML
8 INJECTION, SOLUTION INTRA-ARTICULAR; INTRALESIONAL; INTRAMUSCULAR; INTRAVENOUS; SOFT TISSUE ONCE
Status: COMPLETED | OUTPATIENT
Start: 2022-03-20 | End: 2022-03-20

## 2022-03-20 RX ADMIN — DEXAMETHASONE SODIUM PHOSPHATE 8 MG: 4 INJECTION, SOLUTION INTRA-ARTICULAR; INTRALESIONAL; INTRAMUSCULAR; INTRAVENOUS; SOFT TISSUE at 04:03

## 2022-03-20 NOTE — PROGRESS NOTES
Subjective: Left index finger has been swelling       Patient ID: Sergio Clifford is a 41 y.o. female.    Vitals:  weight is 102.5 kg (226 lb). Her temperature is 97.9 °F (36.6 °C). Her blood pressure is 120/86 and her pulse is 87. Her respiration is 16 and oxygen saturation is 95%.     Chief Complaint: Swelling (Left index finger)    Swelling and itching to left index finger which began Friday, but worse today. No known explanation.      Constitution: Negative for chills, fatigue and fever.   HENT: Negative for congestion, sinus pressure and sore throat.    Neck: Negative for neck pain, neck stiffness and painful lymph nodes.   Cardiovascular: Negative for chest pain and sob on exertion.   Eyes: Negative for eye itching, eye pain and blurred vision.   Respiratory: Negative for cough and shortness of breath.    Gastrointestinal: Negative for abdominal pain, nausea and vomiting.   Endocrine: cold intolerance and heat intolerance.   Genitourinary: Negative for urgency and flank pain.   Musculoskeletal: Positive for joint swelling. Negative for joint pain and gout.   Skin: Positive for color change, rash (left index finger) and erythema (left index finger). Negative for wound and lesion.   Allergic/Immunologic: Positive for itching. Negative for immunocompromised state.   Neurological: Negative for altered mental status and numbness.   Hematologic/Lymphatic: Negative for swollen lymph nodes and trouble clotting.   Psychiatric/Behavioral: Negative for altered mental status and confusion.       Objective:      Physical Exam   Constitutional: She is oriented to person, place, and time. She appears well-developed. She is cooperative.   HENT:   Head: Normocephalic and atraumatic.   Ears:   Right Ear: Hearing normal.   Left Ear: Hearing and ear canal normal.   Nose: Nose normal. No mucosal edema, rhinorrhea or nasal deformity. No epistaxis. Right sinus exhibits no maxillary sinus tenderness and no frontal sinus  tenderness. Left sinus exhibits no maxillary sinus tenderness and no frontal sinus tenderness.   Mouth/Throat: Uvula is midline, oropharynx is clear and moist and mucous membranes are normal. No trismus in the jaw. Normal dentition. No uvula swelling. No oropharyngeal exudate, posterior oropharyngeal edema or posterior oropharyngeal erythema.   Eyes: Lids are normal. No scleral icterus.   Neck: Trachea normal and phonation normal. Neck supple. No edema present. No erythema present. No neck rigidity present.   Cardiovascular: Normal rate, regular rhythm, normal heart sounds and normal pulses.   Pulmonary/Chest: Effort normal and breath sounds normal. No respiratory distress. She has no decreased breath sounds. She has no rhonchi.   Abdominal: Normal appearance.   Musculoskeletal: Normal range of motion.         General: Tenderness (left index finger) present. No deformity. Normal range of motion.   Neurological: no focal deficit. She is alert and oriented to person, place, and time. She exhibits normal muscle tone. Coordination normal.   Skin: Skin is warm, dry, intact, not pale and rash (scattered raised rash to index finger). erythema (left index finger)   Psychiatric: Her speech is normal and behavior is normal. Mood, judgment and thought content normal.   Nursing note and vitals reviewed.        Assessment:       1. Cellulitis of finger of left hand          Plan:         Cellulitis of finger of left hand    Other orders  -     dexamethasone injection 8 mg  -     doxycycline (VIBRAMYCIN) 100 MG Cap; Take 1 capsule (100 mg total) by mouth every 12 (twelve) hours. for 7 days  Dispense: 14 capsule; Refill: 0                 Meds as directed; f/u as needed.

## 2022-04-04 ENCOUNTER — OFFICE VISIT (OUTPATIENT)
Dept: URGENT CARE | Facility: CLINIC | Age: 42
End: 2022-04-04
Payer: MEDICARE

## 2022-04-04 VITALS
DIASTOLIC BLOOD PRESSURE: 81 MMHG | HEART RATE: 93 BPM | OXYGEN SATURATION: 96 % | RESPIRATION RATE: 16 BRPM | SYSTOLIC BLOOD PRESSURE: 124 MMHG | TEMPERATURE: 98 F

## 2022-04-04 DIAGNOSIS — J02.9 SORE THROAT: Primary | ICD-10-CM

## 2022-04-04 DIAGNOSIS — J20.9 ACUTE BRONCHITIS, UNSPECIFIED ORGANISM: ICD-10-CM

## 2022-04-04 DIAGNOSIS — J01.00 ACUTE NON-RECURRENT MAXILLARY SINUSITIS: ICD-10-CM

## 2022-04-04 DIAGNOSIS — R05.9 COUGH: ICD-10-CM

## 2022-04-04 LAB
CTP QC/QA: YES
CTP QC/QA: YES
FLUAV AG NPH QL: NEGATIVE
FLUBV AG NPH QL: NEGATIVE
S PYO RRNA THROAT QL PROBE: NEGATIVE

## 2022-04-04 PROCEDURE — 87804 POCT INFLUENZA A/B: ICD-10-PCS | Mod: 59,QW,, | Performed by: STUDENT IN AN ORGANIZED HEALTH CARE EDUCATION/TRAINING PROGRAM

## 2022-04-04 PROCEDURE — 87880 STREP A ASSAY W/OPTIC: CPT | Mod: QW,,, | Performed by: STUDENT IN AN ORGANIZED HEALTH CARE EDUCATION/TRAINING PROGRAM

## 2022-04-04 PROCEDURE — 87880 POCT RAPID STREP A: ICD-10-PCS | Mod: QW,,, | Performed by: STUDENT IN AN ORGANIZED HEALTH CARE EDUCATION/TRAINING PROGRAM

## 2022-04-04 PROCEDURE — 87804 INFLUENZA ASSAY W/OPTIC: CPT | Mod: QW,,, | Performed by: STUDENT IN AN ORGANIZED HEALTH CARE EDUCATION/TRAINING PROGRAM

## 2022-04-04 PROCEDURE — 99214 OFFICE O/P EST MOD 30 MIN: CPT | Mod: S$GLB,,, | Performed by: STUDENT IN AN ORGANIZED HEALTH CARE EDUCATION/TRAINING PROGRAM

## 2022-04-04 PROCEDURE — 99214 PR OFFICE/OUTPT VISIT, EST, LEVL IV, 30-39 MIN: ICD-10-PCS | Mod: S$GLB,,, | Performed by: STUDENT IN AN ORGANIZED HEALTH CARE EDUCATION/TRAINING PROGRAM

## 2022-04-04 RX ORDER — PREDNISONE 20 MG/1
40 TABLET ORAL DAILY
Qty: 10 TABLET | Refills: 0 | Status: SHIPPED | OUTPATIENT
Start: 2022-04-04 | End: 2022-04-09

## 2022-04-04 RX ORDER — ALBUTEROL SULFATE 90 UG/1
1-2 AEROSOL, METERED RESPIRATORY (INHALATION) EVERY 6 HOURS PRN
Qty: 18 G | Refills: 0 | Status: SHIPPED | OUTPATIENT
Start: 2022-04-04 | End: 2024-03-14

## 2022-04-04 RX ORDER — DOXYCYCLINE HYCLATE 100 MG
100 TABLET ORAL 2 TIMES DAILY
Qty: 20 TABLET | Refills: 0 | Status: SHIPPED | OUTPATIENT
Start: 2022-04-04 | End: 2022-04-14

## 2022-04-04 RX ORDER — PROMETHAZINE HYDROCHLORIDE AND DEXTROMETHORPHAN HYDROBROMIDE 6.25; 15 MG/5ML; MG/5ML
5 SYRUP ORAL
Qty: 118 ML | Refills: 0 | Status: SHIPPED | OUTPATIENT
Start: 2022-04-04 | End: 2022-04-14

## 2022-04-04 NOTE — PROGRESS NOTES
Subjective: Pt complains of sinus problem, st , productive cough, and wheezing x 5 days. Pt has been vaccinated for the flu. Pt has been treating symptoms with OTC meds and states that this has helped, but has not relieved symptoms completely.       Patient ID: Sergio Clifford is a 41 y.o. female.    Vitals:  temperature is 98.3 °F (36.8 °C). Her blood pressure is 124/81 and her pulse is 93. Her respiration is 16 and oxygen saturation is 96%.     Chief Complaint: Sinus Problem, Sore Throat, Cough, and Wheezing    Patient is a 41-year-old female with a past medical history of breast cancer, anemia, AVM, and mitral valve prolapse who presents to clinic for evaluation of sinus infection.  Patient reports she is vaccinated for flu however not COVID.  Patient states daughter with similar symptoms last week.  Patient reports her symptoms have been present for approximately 5 days.  Patient states over-the-counter medications have helped symptoms however not resolve symptoms.  Patient states thinks she needs further treatment at this point.  Patient states typically gets some like this every year due to seasonal allergies.  Patient complaining of fatigue, nasal sinus congestion with thick yellow mucus, postnasal drainage, sinus pressure, sore throat with painful swallowing, productive cough with yellow mucus, occasional wheezing associated with coughing, and sinus headaches.  Patient denies any acute dizziness or vision changes, generalized body aches, fever or chills, ear pain, chest pain or palpitations, shortness of breath, abdominal pain, nausea or vomiting, diarrhea, or rash.      Constitution: Positive for fatigue. Negative for chills, sweating and fever.   HENT: Positive for congestion (With thick yellow mucus), postnasal drip, sinus pressure, sore throat and trouble swallowing (Painful swallowing). Negative for ear pain.    Neck: neck negative. Negative for painful lymph nodes.   Cardiovascular: Negative.   Negative for chest pain and palpitations.   Eyes: Negative.    Respiratory: Positive for cough, sputum production (Yellow mucus) and wheezing (Some wheezing with prolonged coughing). Negative for chest tightness and shortness of breath.    Gastrointestinal: Negative for abdominal pain, nausea, vomiting and diarrhea.   Endocrine: negative.   Genitourinary: Negative.  Negative for dysuria, frequency and urgency.   Musculoskeletal: Negative.  Negative for muscle ache.   Skin: Negative.  Negative for color change, pale, rash and erythema.   Allergic/Immunologic: Positive for seasonal allergies.   Neurological: Positive for headaches (Sinus headaches). Negative for dizziness, light-headedness, passing out, disorientation and altered mental status.   Hematologic/Lymphatic: Negative.  Negative for swollen lymph nodes.   Psychiatric/Behavioral: Negative.  Negative for altered mental status, disorientation and confusion.       Objective:      Physical Exam   Constitutional: She is oriented to person, place, and time. She appears well-developed. She is cooperative.  Non-toxic appearance. She appears ill. No distress.   HENT:   Head: Normocephalic and atraumatic.   Ears:   Right Ear: Hearing, tympanic membrane, external ear and ear canal normal.   Left Ear: Hearing, tympanic membrane, external ear and ear canal normal.   Nose: Rhinorrhea and congestion present. No mucosal edema or nasal deformity. No epistaxis. Right sinus exhibits maxillary sinus tenderness. Right sinus exhibits no frontal sinus tenderness. Left sinus exhibits maxillary sinus tenderness. Left sinus exhibits no frontal sinus tenderness.   Mouth/Throat: Uvula is midline and mucous membranes are normal. Mucous membranes are moist. No trismus in the jaw. Normal dentition. No uvula swelling. Posterior oropharyngeal erythema present. No oropharyngeal exudate. Tonsils are 1+ on the right. Tonsils are 1+ on the left. Oropharynx is clear.   Eyes: Conjunctivae and lids  are normal. Pupils are equal, round, and reactive to light. Right eye exhibits no discharge. Left eye exhibits no discharge. No scleral icterus.   Neck: Trachea normal and phonation normal. Neck supple.   Cardiovascular: Normal rate, regular rhythm, normal heart sounds and normal pulses.   Pulmonary/Chest: Effort normal. No respiratory distress (Equal rise and fall of chest.  Able speak in full complete sentences.  Occasional cough noted.). She has no wheezes. She has rhonchi (Mildly improved with cough). She has no rales.   Abdominal: Normal appearance and bowel sounds are normal. She exhibits no distension. Soft. There is no abdominal tenderness.   Musculoskeletal: Normal range of motion.         General: No deformity. Normal range of motion.   Neurological: She is alert and oriented to person, place, and time. She exhibits normal muscle tone. Gait (Use of assistive devices, Rollator) abnormal. Coordination normal.   Skin: Skin is warm, dry, intact, not diaphoretic, not pale and no rash. Capillary refill takes less than 2 seconds. No erythema   Psychiatric: Her speech is normal and behavior is normal. Judgment and thought content normal.   Nursing note and vitals reviewed.        Assessment:       1. Sore throat    2. Cough    3. Acute non-recurrent maxillary sinusitis    4. Acute bronchitis, unspecified organism          Plan:         Sore throat  -     POCT Influenza A/B  -     POCT rapid strep A    Cough  -     POCT Influenza A/B  -     XR CHEST PA AND LATERAL; Future; Expected date: 04/04/2022    Acute non-recurrent maxillary sinusitis    Acute bronchitis, unspecified organism    Other orders  -     doxycycline (VIBRA-TABS) 100 MG tablet; Take 1 tablet (100 mg total) by mouth 2 (two) times daily. for 10 days  Dispense: 20 tablet; Refill: 0  -     predniSONE (DELTASONE) 20 MG tablet; Take 2 tablets (40 mg total) by mouth once daily. for 5 days  Dispense: 10 tablet; Refill: 0  -     albuterol (VENTOLIN HFA) 90  mcg/actuation inhaler; Inhale 1-2 puffs into the lungs every 6 (six) hours as needed for Wheezing or Shortness of Breath. Rescue  Dispense: 18 g; Refill: 0  -     promethazine-dextromethorphan (PROMETHAZINE-DM) 6.25-15 mg/5 mL Syrp; Take 5 mLs by mouth every 4 to 6 hours as needed (Cough).  Dispense: 118 mL; Refill: 0                 Labs:  Influenza A and B negative.  Rapid strep negative.  Chest x-ray: A ventricular peritoneal shunt catheter courses over right chest.  The heart size is normal.  There are surgical clips superimposed on the mid chest.  The lungs are well expanded without consolidation or pleural effusion.  No acute process.  Decadron 8 mg IM in clinic.  Patient tolerated well.  No complications noted.  Take medications as prescribed.  Tylenol/Motrin per package instructions for any pain or fever.  Assure adequate hydration and rest.  Follow-up with PCP in 1 day.  Return to clinic as needed.  To ED for any new or acutely worsening symptoms.  Patient in agreement with plan of care.    DISCLAIMER: Please note that my documentation in this Electronic Healthcare Record was produced using speech recognition software and therefore may contain errors related to that software system.These could include grammar, punctuation and spelling errors or the inclusion/exclusion of phrases that were not intended. Garbled syntax, mangled pronouns, and other bizarre constructions may be attributed to that software system.

## 2022-05-31 ENCOUNTER — OFFICE VISIT (OUTPATIENT)
Dept: CARDIOLOGY | Facility: CLINIC | Age: 42
End: 2022-05-31
Payer: MEDICARE

## 2022-05-31 VITALS
HEART RATE: 94 BPM | SYSTOLIC BLOOD PRESSURE: 116 MMHG | RESPIRATION RATE: 16 BRPM | OXYGEN SATURATION: 99 % | BODY MASS INDEX: 38.58 KG/M2 | WEIGHT: 226 LBS | DIASTOLIC BLOOD PRESSURE: 74 MMHG | HEIGHT: 64 IN

## 2022-05-31 DIAGNOSIS — R00.2 PALPITATIONS: ICD-10-CM

## 2022-05-31 DIAGNOSIS — E78.5 DYSLIPIDEMIA: Primary | ICD-10-CM

## 2022-05-31 DIAGNOSIS — D50.0 IRON DEFICIENCY ANEMIA DUE TO CHRONIC BLOOD LOSS: ICD-10-CM

## 2022-05-31 DIAGNOSIS — Z17.1 MALIGNANT NEOPLASM OF OVERLAPPING SITES OF LEFT BREAST IN FEMALE, ESTROGEN RECEPTOR NEGATIVE: ICD-10-CM

## 2022-05-31 DIAGNOSIS — C50.812 MALIGNANT NEOPLASM OF OVERLAPPING SITES OF LEFT BREAST IN FEMALE, ESTROGEN RECEPTOR NEGATIVE: ICD-10-CM

## 2022-05-31 PROCEDURE — 99214 PR OFFICE/OUTPT VISIT, EST, LEVL IV, 30-39 MIN: ICD-10-PCS | Mod: S$GLB,,, | Performed by: INTERNAL MEDICINE

## 2022-05-31 PROCEDURE — 93000 EKG 12-LEAD: ICD-10-PCS | Mod: S$GLB,,, | Performed by: INTERNAL MEDICINE

## 2022-05-31 PROCEDURE — 99214 OFFICE O/P EST MOD 30 MIN: CPT | Mod: S$GLB,,, | Performed by: INTERNAL MEDICINE

## 2022-05-31 PROCEDURE — 93000 ELECTROCARDIOGRAM COMPLETE: CPT | Mod: S$GLB,,, | Performed by: INTERNAL MEDICINE

## 2022-05-31 RX ORDER — ROSUVASTATIN CALCIUM 5 MG/1
5 TABLET, COATED ORAL DAILY
Qty: 90 TABLET | Refills: 3 | Status: SHIPPED | OUTPATIENT
Start: 2022-05-31 | End: 2022-06-01 | Stop reason: SDUPTHER

## 2022-05-31 NOTE — ASSESSMENT & PLAN NOTE
She has completed chemo therapy and and also had a breast surgery and reconstructive surgeries almost 2 years since diagnosis and remained cancer free at this time.

## 2022-05-31 NOTE — PROGRESS NOTES
Subjective:    Patient ID:  Sergio Clifford is a 41 y.o. female patient here for evaluation Establish Care, Hyperlipidemia (Abnormal lab testing), and Anemia      History of Present Illness:     Patient is here for follow-up evaluation.  She had complex medical course she has been diagnosed with breast cancer she had 4 surgeries for the same.  She also had abnormal are surgery as well.  She is noted to have elevated cholesterol high despite diet taking and changing her habits and exercising her cholesterol went up over 3 months..  She has no new symptoms of angina PND orthopnea noted.  And no edema noted in the lower extremities.  Overall she feels more fit she is able to participate in wellness program with a  are.    EKG shows normal sinus rhythm with poor R-wave progression are done today.    Review of patient's allergies indicates:   Allergen Reactions    Codeine Itching    Gabapentin     Meperidine Other (See Comments)     hallucinations  Other reaction(s): Other (See Comments)  Hallucinations    Adhesive Rash    Penicillins Hives and Rash    Ultram [tramadol] Nausea Only       Past Medical History:   Diagnosis Date    Anxiety 2006    AVM (arteriovenous malformation) 2006    Balance disorder 2006    from AVM  uses walker    Breast cancer 08/2019    Edema     Fractures 2012    left foot    MVP (mitral valve prolapse) 1997    Reflux     Seroma of breast     recurrent seroma to left breast.     Wears glasses      Past Surgical History:   Procedure Laterality Date    BILATERAL MASTECTOMY  06/04/2020    BRAIN AVM REPAIR  2006    BREAST BIOPSY  left    2002    BREAST RECONSTRUCTION  06/04/2020    BREAST RECONSTRUCTION  06/04/2020    COLONOSCOPY N/A 1/13/2020    Procedure: COLONOSCOPY;  Surgeon: Librado Monroe MD;  [facility];  Service: Endoscopy;  Laterality: N/A;    CRANIOTOMY  2006    CVA tumor removal  2006    ESOPHAGOGASTRODUODENOSCOPY N/A 1/13/2020    Procedure:  EGD (ESOPHAGOGASTRODUODENOSCOPY);  Surgeon: Librado Monroe MD;  Location: [facility] ENDO;  Service: Endoscopy;  Laterality: N/A;    FOOT FRACTURE SURGERY      INSERTION OF CATHETER Right 9/30/2019    Procedure: INSERTION, CATHETER;  Surgeon: Caitlyn Elkins MD;  Location: [facility] OR;  Service: General;  Laterality: Right;    REVISION OF VASCULAR ACCESS PORT Right 12/13/2019    Procedure: REVISION, VASCULAR ACCESS PORT;  Surgeon: Caitlyn Elkins MD;  Location: [facility] OR;  Service: General;  Laterality: Right;  WOUND OPENED AND PORT FLIPPED.    VENTRICULOPERITONEAL SHUNT  2006    MAGNETIC - NO MRI     Social History     Tobacco Use    Smoking status: Former Smoker     Packs/day: 0.25     Years: 4.00     Pack years: 1.00     Types: Cigarettes     Quit date: 5/11/2012     Years since quitting: 10.0    Smokeless tobacco: Never Used   Substance Use Topics    Alcohol use: Yes     Comment: social    Drug use: No        Review of Systems:    As noted in HPI in addition      REVIEW OF SYSTEMS  CARDIOVASCULAR: No recent chest pain, palpitations, arm, neck, or jaw pain  RESPIRATORY: No recent fever, cough chills, SOB or congestion  : No blood in the urine  GI: No Nausea, vomiting, constipation, diarrhea, blood, or reflux.  MUSCULOSKELETAL: No myalgias  NEURO: No lightheadedness or dizziness  EYES: No Double vision, blurry, vision or headache              Objective        Vitals:    05/31/22 1714   BP: 116/74   Pulse: 94   Resp: 16       LIPIDS - LAST 2   No results found for: CHOL, HDL, LDLCALC, TRIG, CHOLHDL    CBC - LAST 2  Lab Results   Component Value Date    WBC 6.97 11/05/2021    WBC 6.89 11/03/2021    RBC 4.09 11/05/2021    RBC 4.13 11/03/2021    HGB 13.2 11/05/2021    HGB 13.7 11/03/2021    HCT 39.6 11/05/2021    HCT 41.0 11/03/2021    MCV 97 11/05/2021    MCV 99 (H) 11/03/2021    MCH 32.3 (H) 11/05/2021    MCH 33.2 (H) 11/03/2021    MCHC 33.3 11/05/2021    MCHC 33.4 11/03/2021    RDW 13.2 11/05/2021    RDW 13.3 11/03/2021      11/05/2021     11/03/2021    MPV 8.4 (L) 11/05/2021    MPV 8.2 (L) 11/03/2021    GRAN 4.1 11/05/2021    GRAN 58.8 11/05/2021    LYMPH 2.2 11/05/2021    LYMPH 30.8 11/05/2021    MONO 0.6 11/05/2021    MONO 9.0 11/05/2021    BASO 0.02 11/05/2021    BASO 0.03 11/03/2021    NRBC 0 11/05/2021    NRBC 0 11/03/2021       CHEMISTRY & LIVER FUNCTION - LAST 2  Lab Results   Component Value Date     02/10/2020     01/13/2020    K 4.2 02/10/2020    K 3.9 01/13/2020     02/10/2020     01/13/2020    CO2 27 02/10/2020    CO2 28 01/13/2020    ANIONGAP 12 02/10/2020    ANIONGAP 8 01/13/2020    BUN 13 02/10/2020    BUN 7 01/13/2020    CREATININE 0.6 02/10/2020    CREATININE 0.8 01/13/2020     (H) 02/10/2020     (H) 01/13/2020    CALCIUM 9.3 02/10/2020    CALCIUM 9.4 01/13/2020    ALBUMIN 3.8 02/10/2020    ALBUMIN 3.6 01/13/2020    PROT 6.8 02/10/2020    PROT 6.7 01/13/2020    ALKPHOS 57 02/10/2020    ALKPHOS 44 (L) 01/13/2020    ALT 22 02/10/2020    ALT 21 01/13/2020    AST 16 02/10/2020    AST 22 01/13/2020    BILITOT 0.9 02/10/2020    BILITOT 1.1 (H) 01/13/2020        CARDIAC PROFILE - LAST 2  No results found for: BNP, CPK, CPKMB, LDH, TROPONINI     COAGULATION - LAST 2  Lab Results   Component Value Date    LABPT 12.5 11/05/2021    INR 1.0 11/05/2021    APTT 25.5 11/05/2021       ENDOCRINE & PSA - LAST 2  No results found for: HGBA1C, MICROALBUR, TSH, PROCAL, PSA     ECHOCARDIOGRAM RESULTS  Results for orders placed in visit on 12/30/19    Echo Color Flow Doppler? Yes; Bubble Contrast? No    Interpretation Summary  · The left ventricular global longitudinal strain is --18%.< normal>  · Increased (hyperdynamic) left ventricular systolic function. The estimated ejection fraction is 75%  · Normal LV diastolic function.  · No wall motion abnormalities.  · Normal right ventricular systolic function.  · Normal central venous pressure (3 mm Hg).      CURRENT/PREVIOUS VISIT  EKG  Results for orders placed or performed during the hospital encounter of 09/27/19   EKG 12-lead    Collection Time: 09/27/19  9:20 AM    Narrative    Test Reason : [id number],    Vent. Rate : 073 BPM     Atrial Rate : 073 BPM     P-R Int : 150 ms          QRS Dur : 074 ms      QT Int : 386 ms       P-R-T Axes : 032 044 036 degrees     QTc Int : 425 ms    Normal sinus rhythm  Normal ECG  When compared with ECG of 16-OCT-2012 13:48,  No significant change was found  Confirmed by Annmarie Willis MD ([id number]) on 9/27/2019 7:55:21 PM    Referred By: СВЕТЛАНА ELDER           Confirmed By:Annmarie Willis MD     No valid procedures specified.   No results found for this or any previous visit.    No valid procedures specified.    PHYSICAL EXAM  CONSTITUTIONAL: Well built, well nourished in no apparent distress  NECK: no carotid bruit, no JVD  LUNGS: CTA  CHEST WALL: no tenderness  HEART: regular rate and rhythm, S1, S2 normal, no murmur, click, rub or gallop   ABDOMEN: soft, non-tender; bowel sounds normal; no masses,  no organomegaly  EXTREMITIES: Extremities normal, no edema, no calf tenderness noted  NEURO: AAO X 3    I HAVE REVIEWED :    The vital signs, nurses notes, and all the pertinent radiology and labs.        Current Outpatient Medications   Medication Instructions    albuterol (VENTOLIN HFA) 90 mcg/actuation inhaler 1-2 puffs, Inhalation, Every 6 hours PRN, Rescue    ascorbic acid (vitamin C) (VITAMIN C) 1,000 mg, Oral, Daily    ascorbic acid/multivit-min (EMERGEN-C IMMUNE PLUS ORAL) 1 packet, Oral, Daily    buPROPion (WELLBUTRIN SR) 100 mg, Oral, Daily    cyanocobalamin, vitamin B-12, (VITAMIN B-12 ORAL) 1 tablet, Oral, Daily    DULoxetine (CYMBALTA) 60 mg, Oral, Daily    esomeprazole (NEXIUM) 40 mg, Oral, Before breakfast    fluticasone propionate (FLONASE) 50 mcg/actuation nasal spray 2 sprays, Each Nostril, Daily    magnesium oxide 400 mg, Oral, Daily    metoprolol succinate (TOPROL-XL) 25 mg,  Oral, Daily    multivitamin (THERAGRAN) per tablet 1 tablet, Oral, Daily    naproxen (NAPROSYN) 500 MG tablet TAKE 1 TABLET(500 MG) BY MOUTH TWICE DAILY FOR 15 DAYS    plant stanol deb (CHOLEST OFF ORAL) Oral    sour cherry extract 1,000 mg Cap 1 tablet, Oral, Daily    vitamin D (VITAMIN D3) 1,000 Units, Oral, Daily    zinc gluconate 50 mg, Oral, Daily          Assessment & Plan     Dyslipidemia  At this point she probably will benefit from statin therapy.  I would recommend starting on Crestor 5 mg a day and will increase this as tolerated.  Continue on other medications same doses and are advised to use court to Co Q10 200 mg daily.  Maintain on low-fat low-cholesterol diet    Malignant neoplasm of overlapping sites of breast in female, estrogen receptor negative  She has completed chemo therapy and and also had a breast surgery and reconstructive surgeries almost 2 years since diagnosis and remained cancer free at this time.    Iron deficiency anemia due to chronic blood loss  She is or intolerant to oral iron.  And she has been doing better currently    Palpitations  They have resolved and she is continuing on Toprol-XL 25 mg daily.  And also to continue on magnesium 400 mg a day symptoms have resolved completely          Follow up in about 6 months (around 11/30/2022).

## 2022-05-31 NOTE — ASSESSMENT & PLAN NOTE
At this point she probably will benefit from statin therapy.  I would recommend starting on Crestor 5 mg a day and will increase this as tolerated.  Continue on other medications same doses and are advised to use court to Co Q10 200 mg daily.  Maintain on low-fat low-cholesterol diet

## 2022-05-31 NOTE — ASSESSMENT & PLAN NOTE
They have resolved and she is continuing on Toprol-XL 25 mg daily.  And also to continue on magnesium 400 mg a day symptoms have resolved completely

## 2022-06-01 ENCOUNTER — TELEPHONE (OUTPATIENT)
Dept: CARDIOLOGY | Facility: CLINIC | Age: 42
End: 2022-06-01
Payer: MEDICARE

## 2022-06-01 NOTE — TELEPHONE ENCOUNTER
----- Message from Augusto Magallon sent at 6/1/2022  4:30 PM CDT -----  Contact: pt  Follow up from last message says Rx could be sent into [facility] for cheaper Rx price     [id number] - [place], [place] - [address]  [address]  [place] [address]  Phone: [phone] Fax: [phone]    Who Called:pt    Best Call Back Number:[phone]    Additional Information: Jose is too expensive for Rx pickup, asking for alternative if office has samples or if there is an otc a pt can pickup as well please call back pt. rosuvastatin (CRESTOR) 5 MG tablet    Please Advise-Thank you

## 2022-06-01 NOTE — TELEPHONE ENCOUNTER
crestor is over 400 with her insurance     I called and spoke with her, she is going to move it to the [facility] in [place] and use good rx for a cheaper price.

## 2022-06-02 RX ORDER — ROSUVASTATIN CALCIUM 5 MG/1
5 TABLET, COATED ORAL DAILY
Qty: 90 TABLET | Refills: 3 | Status: SHIPPED | OUTPATIENT
Start: 2022-06-02 | End: 2023-01-17 | Stop reason: SDUPTHER

## 2022-06-07 ENCOUNTER — TELEPHONE (OUTPATIENT)
Dept: HEMATOLOGY/ONCOLOGY | Facility: CLINIC | Age: 42
End: 2022-06-07

## 2022-06-22 ENCOUNTER — OFFICE VISIT (OUTPATIENT)
Dept: URGENT CARE | Facility: CLINIC | Age: 42
End: 2022-06-22
Payer: MEDICARE
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
## 2022-06-22 DIAGNOSIS — S81.811A LACERATION OF RIGHT LOWER EXTREMITY, INITIAL ENCOUNTER: Primary | ICD-10-CM

## 2022-06-22 PROCEDURE — 99213 PR OFFICE/OUTPT VISIT, EST, LEVL III, 20-29 MIN: ICD-10-PCS | Mod: 25,S$GLB,, | Performed by: NURSE PRACTITIONER

## 2022-06-22 PROCEDURE — 99213 OFFICE O/P EST LOW 20 MIN: CPT | Mod: 25,S$GLB,, | Performed by: NURSE PRACTITIONER

## 2022-06-22 PROCEDURE — 12001 RPR S/N/AX/GEN/TRNK 2.5CM/<: CPT | Mod: S$GLB,,, | Performed by: NURSE PRACTITIONER

## 2022-06-22 PROCEDURE — 12001 LACERATION REPAIR: ICD-10-PCS | Mod: S$GLB,,, | Performed by: NURSE PRACTITIONER

## 2022-06-22 RX ORDER — DOXYCYCLINE 100 MG/1
100 CAPSULE ORAL 2 TIMES DAILY
Qty: 14 CAPSULE | Refills: 0 | Status: SHIPPED | OUTPATIENT
Start: 2022-06-22 | End: 2022-06-29

## 2022-06-22 NOTE — PROGRESS NOTES
Subjective:       Patient ID: Sergio Clifford is a 41 y.o. female.    Vitals:  vitals were not taken for this visit.     Chief Complaint: Laceration (PT reports falling on tile in home.. Doing so cut her knee. States last TD was within the last 10 years.)    Presents with laceration to the right leg after a fall last night.  States UTD on tetanus.    Laceration   The incident occurred less than 1 hour ago. The laceration is located on the right leg. The laceration is 4 cm in size. The laceration mechanism is unknown.      Skin: Positive for laceration.       Objective:      Physical Exam   Skin:         Comments: Laceration lateral to right knee approx 4cm.  2 smaller lacerations approx 1.5cm each below the larger laceration.  Bleeding controlled.         Assessment:       No diagnosis found.      Plan:         There are no diagnoses linked to this encounter.

## 2022-06-22 NOTE — PROCEDURES
Laceration Repair    Date/Time: 6/22/2022 11:20 AM  Performed by: LASHAUN Odonnell  Authorized by: LASHAUN Odonnell   Body area: lower extremity  Location details: right lower leg  Foreign bodies: no foreign bodies  Tendon involvement: none  Nerve involvement: none  Vascular damage: no  Anesthesia: local infiltration    Anesthesia:  Local Anesthetic: lidocaine 1% with epinephrine    Patient sedated: no  Irrigation solution: saline  Irrigation method: jet lavage  Amount of cleaning: standard  Debridement: none  Skin closure: 5-0 nylon  Number of sutures: 5  Technique: simple  Approximation: loose  Approximation difficulty: complex  Dressing: 4x4 sterile gauze  Patient tolerance: Patient tolerated the procedure well with no immediate complications

## 2022-06-27 ENCOUNTER — OFFICE VISIT (OUTPATIENT)
Dept: HEMATOLOGY/ONCOLOGY | Facility: CLINIC | Age: 42
End: 2022-06-27
Payer: MEDICARE

## 2022-06-27 VITALS
RESPIRATION RATE: 18 BRPM | HEART RATE: 77 BPM | HEIGHT: 64 IN | DIASTOLIC BLOOD PRESSURE: 84 MMHG | WEIGHT: 227 LBS | SYSTOLIC BLOOD PRESSURE: 142 MMHG | BODY MASS INDEX: 38.76 KG/M2

## 2022-06-27 DIAGNOSIS — C50.812 MALIGNANT NEOPLASM OF OVERLAPPING SITES OF LEFT BREAST IN FEMALE, ESTROGEN RECEPTOR NEGATIVE: Primary | ICD-10-CM

## 2022-06-27 DIAGNOSIS — Z17.1 MALIGNANT NEOPLASM OF OVERLAPPING SITES OF LEFT BREAST IN FEMALE, ESTROGEN RECEPTOR NEGATIVE: Primary | ICD-10-CM

## 2022-06-27 DIAGNOSIS — D50.0 IRON DEFICIENCY ANEMIA DUE TO CHRONIC BLOOD LOSS: ICD-10-CM

## 2022-06-27 PROCEDURE — 99214 OFFICE O/P EST MOD 30 MIN: CPT | Mod: S$GLB,,, | Performed by: INTERNAL MEDICINE

## 2022-06-27 PROCEDURE — 99214 PR OFFICE/OUTPT VISIT, EST, LEVL IV, 30-39 MIN: ICD-10-PCS | Mod: S$GLB,,, | Performed by: INTERNAL MEDICINE

## 2022-06-27 NOTE — PROGRESS NOTES
"  [facility] hematology Oncology in office subsequent encounter  note  Sasha Fish NP  6/27/22      C/O:  breast cancer follow up    Triple negative.  Willard adjuvant chemo x's 6.  Surgery, CRISTIAN.  Reconstructed.  Needs further surgery to "clean up" breast.  Hgb 10.  Ferritin decreased, heavy menses. Check stool for heme.  Discussed po Fe pills vs IV Fe infusion weekly x's 2 weeks.  She agrees to Fe infusions.  She has tolerated Fe po poorly in the past.  It is iron infusions were given and energy is improved.  Hemoglobin is normal at this time and ferritin is normal at this time.      She had bilateral mastectomy with flap reconstruction surgery on June 4th, 2020.   Dr. Castellon.  S/P chemo x's 6 cycles  Neoadjuvant Rx.   Echo ej fx NL.    She complains of tenderness at the inferior lateral aspect of her breast and tenderness at the inferior aspect of the right breast.  She had a seroma at L breast under management of plastic surgeon and is followed with intermittent ultrasound of the area.  It approximates 1-1/2 years since her major surgery.    She does have a central nervous Systems shunt placed in 2006.  Will discuss with radiologist to see if MRI of breast is feasible in the setting of the shunt.    EGD, colonoscopy 1/13/20.  Left Breast Cancer, triple negative.    L breast mamm and u/s 2/26/20.  Mamm/U/S mass effect 2.8 cm to 1 cm, improved L breast.  R breast with fat necrosis.    Considered getting a MRI, but she has CNS shunt in place, S/P surgery for AVM.    She had L mastectomy and Sentinal node bx.  Also R prophylactic mastectomy with  Bilateral reconstruction.  Estimated 10% risk of Ca recurrence on chest wall.    PN sx have increased since completing chemo.  Joint pain sx increased. Cause?  Trial of lyrica 50mg 1 TID.    The ultrasound showed that the breast mass had resolved and was not seen on the ultrasound study.  This is a very good response to the 6 cycles of neoadjuvant chemotherapy.  Pre " op u/s.    She told me CRISTIAN in path report.  Original pathology report from [facility] was triple negative.  [facility] showed no evidence of disease in the left or right breast or in the sentinel node biopsy.    She achieved complete remission status with the neoadjuvant chemotherapy x6 cycles.    On examination today examination today, she does not have palpable lymphadenopathy at the cervical, supraclavicular, or axillary areas at the left or right.    The left and right breast shows extensive postoperative change however the incisions are closed and healing, the areas are nontender without palpable mass, ecchymoses have resolved.  The abdominal area shows that the anterior incision is closed,  well-healed, nontender without ecchymoses.    She had bilateral mastectomy with reconstruction.  I would estimate her risk of developing new primary breast cancer at 3% when followed over the next many years.  The original breast cancer was triple negative.  I have not recommended tamoxifen or Arimidex adjuvantly or preventively for her.     She had further surgery to get symmetrical appearance to the left and right breast .  The incision sites are closed and well healed and the breast are nontender  she does not have palpable mass at the left or right breast.  Her lungs are clear bilaterally, breathing is unlabored  She has regular rhythm without murmur, chest wall is nontender.  Abdomen is nontender without distention and liver and spleen are not palpable  She does not have CVA tenderness  Calves are nontender without edema or petechiae  Neurologically she is grossly intact.  She does not have palpable lymphadenopathy on exam    She had a hemoglobin at 10.  Ferritin 14.  Injectafer weekly x's 2, [facility].  Completed.  Check stools for heme.    Now Hgb stable 13.8. and Ferritin is acceptable at 60.  Observe for now.    RTC 6 months.  CBC, CMP, ferritin,  CEA 6 months.

## 2022-06-30 ENCOUNTER — OFFICE VISIT (OUTPATIENT)
Dept: URGENT CARE | Facility: CLINIC | Age: 42
End: 2022-06-30
Payer: MEDICARE

## 2022-06-30 VITALS
SYSTOLIC BLOOD PRESSURE: 123 MMHG | HEART RATE: 86 BPM | OXYGEN SATURATION: 98 % | BODY MASS INDEX: 38.96 KG/M2 | WEIGHT: 227 LBS | TEMPERATURE: 98 F | DIASTOLIC BLOOD PRESSURE: 86 MMHG | RESPIRATION RATE: 16 BRPM

## 2022-06-30 DIAGNOSIS — S81.811D LACERATION OF RIGHT LOWER EXTREMITY, SUBSEQUENT ENCOUNTER: Primary | ICD-10-CM

## 2022-06-30 PROCEDURE — 99213 PR OFFICE/OUTPT VISIT, EST, LEVL III, 20-29 MIN: ICD-10-PCS | Mod: S$GLB,,, | Performed by: STUDENT IN AN ORGANIZED HEALTH CARE EDUCATION/TRAINING PROGRAM

## 2022-06-30 PROCEDURE — 99213 OFFICE O/P EST LOW 20 MIN: CPT | Mod: S$GLB,,, | Performed by: STUDENT IN AN ORGANIZED HEALTH CARE EDUCATION/TRAINING PROGRAM

## 2022-06-30 RX ORDER — SULFAMETHOXAZOLE AND TRIMETHOPRIM 800; 160 MG/1; MG/1
1 TABLET ORAL 2 TIMES DAILY
Qty: 14 TABLET | Refills: 0 | Status: SHIPPED | OUTPATIENT
Start: 2022-06-30 | End: 2022-07-07

## 2022-06-30 NOTE — PROGRESS NOTES
Subjective:       Patient ID: Sergio Clifford is a 41 y.o. female.    Vitals:  weight is 103 kg (227 lb). Her temperature is 98 °F (36.7 °C). Her blood pressure is 123/86 and her pulse is 86. Her respiration is 16 and oxygen saturation is 98%.     Chief Complaint: Suture / Staple Removal    Patient is a 41-year-old female with a past medical history of breast cancer, anemia, AVM, and mitral valve prolapse who presents to clinic for suture removal.  Patient previously seen in the clinic on June 22, 2022 for a fall were sutures were placed to her right lower extremity.  Patient reports was in her kitchen when she tripped and fell cutting her right leg on the tile floor.  Patient reports 3 small cuts to her right leg from the fall.  Patient reports 1 of the lacerations (the largest) was sutured.  Patient reports 5 sutures were placed by the provider.  Patient reports 1 of the sutures came out itself yesterday.  Patient states remains with 4 sutures intact.  Patient reports that the laceration seems to be open a little.  Patient reports has had a low-grade fever initially however yesterday has noticed some yellowish colored drainage from the left lateral aspect of the wound.  Patient states has taken doxycycline as prescribed.  Patient reports also having some tenderness surrounding the laceration along with some redness and warmth.  Patient denies any swelling.  Patient states the 2 smaller lacerations below the largest has not had any complications.  Patient reports abrasions on her left knee are old from a different fall.  Patient denies any acute chest pain or shortness of breath, abdominal pain, nausea or vomiting, diarrhea, headaches or dizziness.    Suture / Staple Removal  The sutures were placed 7 to 10 days ago. She tried oral antibiotics since the wound repair. The maximum temperature noted was less than 100.4 F. The temperature was taken using an oral thermometer. There has been colored discharge from  the wound.       Constitution: Positive for fever (Low-grade).   HENT: Negative.    Neck: neck negative.   Cardiovascular: Negative.  Negative for chest pain and palpitations.   Eyes: Negative.    Respiratory: Negative.  Negative for chest tightness, cough and shortness of breath.    Gastrointestinal: Negative.  Negative for abdominal pain, nausea, vomiting and diarrhea.   Endocrine: negative.   Genitourinary: Negative.    Skin: Positive for laceration (Right lower extremity). Negative for erythema.   Allergic/Immunologic: Negative.    Neurological: Negative.  Negative for dizziness, light-headedness, passing out, headaches, disorientation and altered mental status.   Hematologic/Lymphatic: Negative.    Psychiatric/Behavioral: Negative.  Negative for altered mental status, disorientation and confusion.       Objective:      Physical Exam   Constitutional: She is oriented to person, place, and time. She appears well-developed. She is cooperative.  Non-toxic appearance. She does not appear ill. No distress.   HENT:   Head: Normocephalic and atraumatic.   Ears:   Right Ear: Hearing, tympanic membrane, external ear and ear canal normal.   Left Ear: Hearing, tympanic membrane, external ear and ear canal normal.   Nose: Nose normal. No mucosal edema, rhinorrhea or nasal deformity. No epistaxis. Right sinus exhibits no maxillary sinus tenderness and no frontal sinus tenderness. Left sinus exhibits no maxillary sinus tenderness and no frontal sinus tenderness.   Mouth/Throat: Uvula is midline, oropharynx is clear and moist and mucous membranes are normal. No trismus in the jaw. Normal dentition. No uvula swelling. No posterior oropharyngeal erythema.   Eyes: Conjunctivae and lids are normal. Pupils are equal, round, and reactive to light. Right eye exhibits no discharge. Left eye exhibits no discharge. No scleral icterus.   Neck: Trachea normal and phonation normal. Neck supple.   Cardiovascular: Normal rate, regular  rhythm, normal heart sounds and normal pulses.   Pulmonary/Chest: Effort normal and breath sounds normal. No respiratory distress. She has no wheezes. She has no rhonchi. She has no rales.   Abdominal: Normal appearance and bowel sounds are normal. She exhibits no distension. Soft. There is no abdominal tenderness.   Musculoskeletal: Normal range of motion.         General: No deformity. Normal range of motion.      Right lower leg: She exhibits laceration.   Neurological: She is alert and oriented to person, place, and time. She exhibits normal muscle tone. Gait (Use of assistive devices) abnormal. Coordination normal.   Skin: Skin is warm, dry, intact, not diaphoretic, not pale and no rash. Capillary refill takes less than 2 seconds. Abrasions - lower ext.:  Knee (left)Lacerations - lower ext.:  right lower leg No erythema        Psychiatric: Her speech is normal and behavior is normal. Judgment and thought content normal.   Nursing note and vitals reviewed.        Assessment:       1. Laceration of right lower extremity, subsequent encounter          Plan:         Laceration of right lower extremity, subsequent encounter    Other orders  -     sulfamethoxazole-trimethoprim 800-160mg (BACTRIM DS) 800-160 mg Tab; Take 1 tablet by mouth 2 (two) times daily. for 7 days  Dispense: 14 tablet; Refill: 0                 Take medications as prescribed.  Return to clinic in 2-3 days for suture removal.  Recommend allowing for 10 day duration prior to removal.  Tylenol/Motrin per package instructions for any pain or fever.  Wound care as directed.  Keep area clean.  Clean with antibacterial soap and warm water.  Pat dry.  Follow-up with PCP as needed.  Return to clinic as needed.  To ED for any new acutely worsening symptoms.  Patient in agreement with plan of care.    DISCLAIMER: Please note that my documentation in this Electronic Healthcare Record was produced using speech recognition software and therefore may contain  errors related to that software system.These could include grammar, punctuation and spelling errors or the inclusion/exclusion of phrases that were not intended. Garbled syntax, mangled pronouns, and other bizarre constructions may be attributed to that software system.

## 2022-08-08 NOTE — PROGRESS NOTES
Chart sent to medical director for chart review per [facility] collaborative requirement.

## 2022-09-06 ENCOUNTER — OFFICE VISIT (OUTPATIENT)
Dept: URGENT CARE | Facility: CLINIC | Age: 42
End: 2022-09-06
Payer: MEDICARE

## 2022-09-06 VITALS
HEART RATE: 82 BPM | SYSTOLIC BLOOD PRESSURE: 125 MMHG | WEIGHT: 220 LBS | BODY MASS INDEX: 37.56 KG/M2 | DIASTOLIC BLOOD PRESSURE: 89 MMHG | HEIGHT: 64 IN | OXYGEN SATURATION: 98 % | TEMPERATURE: 98 F

## 2022-09-06 DIAGNOSIS — R09.82 POST-NASAL DRIP: ICD-10-CM

## 2022-09-06 DIAGNOSIS — J06.9 UPPER RESPIRATORY TRACT INFECTION, UNSPECIFIED TYPE: Primary | ICD-10-CM

## 2022-09-06 DIAGNOSIS — Z86.16 PERSONAL HISTORY OF COVID-19: ICD-10-CM

## 2022-09-06 PROCEDURE — 99214 OFFICE O/P EST MOD 30 MIN: CPT | Mod: S$GLB,,, | Performed by: NURSE PRACTITIONER

## 2022-09-06 PROCEDURE — 99214 PR OFFICE/OUTPT VISIT, EST, LEVL IV, 30-39 MIN: ICD-10-PCS | Mod: S$GLB,,, | Performed by: NURSE PRACTITIONER

## 2022-09-06 RX ORDER — AZITHROMYCIN 250 MG/1
TABLET, FILM COATED ORAL
Qty: 6 TABLET | Refills: 0 | Status: SHIPPED | OUTPATIENT
Start: 2022-09-06 | End: 2022-09-11

## 2022-09-06 RX ORDER — DEXTROMETHORPHAN HYDROBROMIDE, GUAIFENESIN, AND PHENYLEPHRINE HYDROCHLORIDE 17.5; 385; 1 MG/1; MG/1; MG/1
1 TABLET ORAL EVERY 4 HOURS PRN
Qty: 20 TABLET | Refills: 0 | Status: SHIPPED | OUTPATIENT
Start: 2022-09-06 | End: 2022-09-13

## 2022-09-06 NOTE — PROGRESS NOTES
"Subjective:       Patient ID: Sergio Clifford is a 41 y.o. female.    Vitals:  height is 5' 4" (1.626 m) and weight is 99.8 kg (220 lb). Her oral temperature is 98.1 °F (36.7 °C). Her blood pressure is 125/89 and her pulse is 82. Her oxygen saturation is 98%.     Chief Complaint: Cough and Nasal Congestion    Sergio Clifford presents to clinic with nasal congestion, postnasal drip, runny nose that has been present for over a week.Pt tested positive for covid 7 days ago     Cough  This is a new problem. The current episode started 1 to 4 weeks ago. The problem has been unchanged. Associated symptoms include nasal congestion, postnasal drip and rhinorrhea.     Constitution: Negative.   HENT:  Positive for postnasal drip.    Neck: neck negative.   Cardiovascular: Negative.    Eyes: Negative.    Respiratory:  Positive for cough.    Gastrointestinal: Negative.    Endocrine: negative.   Genitourinary: Negative.    Musculoskeletal: Negative.    Skin: Negative.      Objective:      Physical Exam   Constitutional: She is oriented to person, place, and time. She appears well-developed. She is cooperative.  Non-toxic appearance. She appears ill. No distress.   HENT:   Head: Normocephalic and atraumatic.   Ears:   Right Ear: Hearing, external ear and ear canal normal. Tympanic membrane is injected. A middle ear effusion is present.   Left Ear: Hearing, external ear and ear canal normal. Tympanic membrane is injected. A middle ear effusion is present.   Nose: Nose normal. No mucosal edema, rhinorrhea or nasal deformity. No epistaxis. Right sinus exhibits no maxillary sinus tenderness and no frontal sinus tenderness. Left sinus exhibits no maxillary sinus tenderness and no frontal sinus tenderness.   Mouth/Throat: Uvula is midline and mucous membranes are normal. No trismus in the jaw. Normal dentition. No uvula swelling. Posterior oropharyngeal erythema present. No oropharyngeal exudate or posterior oropharyngeal " edema.   Eyes: Conjunctivae and lids are normal. No scleral icterus.   Neck: Trachea normal and phonation normal. Neck supple. No edema present. No erythema present. No neck rigidity present.   Cardiovascular: Normal rate, regular rhythm, normal heart sounds and normal pulses.   Pulmonary/Chest: Effort normal. No respiratory distress. She has no decreased breath sounds. She has rhonchi in the right upper field.   Abdominal: Normal appearance.   Musculoskeletal: Normal range of motion.         General: No deformity. Normal range of motion.   Lymphadenopathy:     She has cervical adenopathy.        Right cervical: Superficial cervical adenopathy present.        Left cervical: Superficial cervical adenopathy present.   Neurological: She is alert and oriented to person, place, and time. She exhibits normal muscle tone. Coordination normal.   Skin: Skin is warm, dry, intact, not diaphoretic and not pale.   Psychiatric: Her speech is normal and behavior is normal. Judgment and thought content normal.   Nursing note and vitals reviewed.      Assessment:       1. Upper respiratory tract infection, unspecified type    2. Personal history of COVID-19    3. Post-nasal drip          Plan:         Upper respiratory tract infection, unspecified type  -     azithromycin (Z-TONY) 250 MG tablet; Take 2 tablets by mouth on day 1; Take 1 tablet by mouth on days 2-5  Dispense: 6 tablet; Refill: 0  -     phenylephrine-DM-guaiFENesin (DECONEX DMX) 10-17.5-385 mg Tab; Take 1 tablet by mouth every 4 (four) hours as needed (uri).  Dispense: 20 tablet; Refill: 0    Personal history of COVID-19  -     azithromycin (Z-TONY) 250 MG tablet; Take 2 tablets by mouth on day 1; Take 1 tablet by mouth on days 2-5  Dispense: 6 tablet; Refill: 0  -     phenylephrine-DM-guaiFENesin (DECONEX DMX) 10-17.5-385 mg Tab; Take 1 tablet by mouth every 4 (four) hours as needed (uri).  Dispense: 20 tablet; Refill: 0    Post-nasal drip  -     azithromycin (Z-TONY)  250 MG tablet; Take 2 tablets by mouth on day 1; Take 1 tablet by mouth on days 2-5  Dispense: 6 tablet; Refill: 0  -     phenylephrine-DM-guaiFENesin (DECONEX DMX) 10-17.5-385 mg Tab; Take 1 tablet by mouth every 4 (four) hours as needed (uri).  Dispense: 20 tablet; Refill: 0

## 2022-10-13 ENCOUNTER — TELEPHONE (OUTPATIENT)
Dept: HEMATOLOGY/ONCOLOGY | Facility: CLINIC | Age: 42
End: 2022-10-13

## 2022-12-12 ENCOUNTER — TELEPHONE (OUTPATIENT)
Dept: CARDIOLOGY | Facility: CLINIC | Age: 42
End: 2022-12-12
Payer: MEDICARE

## 2022-12-12 DIAGNOSIS — C50.812 MALIGNANT NEOPLASM OF OVERLAPPING SITES OF LEFT BREAST IN FEMALE, ESTROGEN RECEPTOR NEGATIVE: ICD-10-CM

## 2022-12-12 DIAGNOSIS — Z17.1 MALIGNANT NEOPLASM OF OVERLAPPING SITES OF LEFT BREAST IN FEMALE, ESTROGEN RECEPTOR NEGATIVE: ICD-10-CM

## 2022-12-12 DIAGNOSIS — D50.0 IRON DEFICIENCY ANEMIA DUE TO CHRONIC BLOOD LOSS: ICD-10-CM

## 2022-12-12 DIAGNOSIS — E78.5 DYSLIPIDEMIA: Primary | ICD-10-CM

## 2022-12-12 NOTE — TELEPHONE ENCOUNTER
----- Message from Marcell Montano sent at 12/12/2022  3:07 PM CST -----  Type:  Sooner Appointment Request    Caller is requesting a sooner appointment.  Caller declined first available appointment listed below.  Caller will not accept being placed on the waitlist and is requesting a message be sent to doctor.    Name of Caller:  Pt  When is the first available appointment?     Symptoms:  check up thought she already had it booked  Best Call Back Number:  [phone]     Additional Information:  Sts she will also need lab orders  sent to [facility].  Please advise -- Thank you

## 2022-12-27 ENCOUNTER — HOSPITAL ENCOUNTER (OUTPATIENT)
Dept: PREADMISSION TESTING | Facility: HOSPITAL | Age: 42
Discharge: HOME | End: 2022-12-27
Attending: INTERNAL MEDICINE
Payer: MEDICARE

## 2022-12-27 VITALS
SYSTOLIC BLOOD PRESSURE: 144 MMHG | HEART RATE: 86 BPM | DIASTOLIC BLOOD PRESSURE: 96 MMHG | OXYGEN SATURATION: 99 % | HEIGHT: 64 IN | RESPIRATION RATE: 20 BRPM | WEIGHT: 220 LBS | TEMPERATURE: 98 F | BODY MASS INDEX: 37.56 KG/M2

## 2022-12-27 DIAGNOSIS — Z79.01 MONITORING FOR ANTICOAGULANT USE: ICD-10-CM

## 2022-12-27 DIAGNOSIS — Z01.818 PREOP TESTING: Primary | ICD-10-CM

## 2022-12-27 DIAGNOSIS — Z51.81 MONITORING FOR ANTICOAGULANT USE: ICD-10-CM

## 2022-12-27 LAB
APTT BLDCRRT: 24.2 SEC (ref 21–32)
BASOPHILS # BLD AUTO: 0.04 K/UL (ref 0–0.2)
BASOPHILS NFR BLD: 0.4 % (ref 0–1.9)
DIFFERENTIAL METHOD: ABNORMAL
EOSINOPHIL # BLD AUTO: 0.1 K/UL (ref 0–0.5)
EOSINOPHIL NFR BLD: 1.1 % (ref 0–8)
ERYTHROCYTE [DISTWIDTH] IN BLOOD BY AUTOMATED COUNT: 12.7 % (ref 11.5–14.5)
HCT VFR BLD AUTO: 43.4 % (ref 37–48.5)
HGB BLD-MCNC: 14.4 G/DL (ref 12–16)
IMM GRANULOCYTES # BLD AUTO: 0.04 K/UL (ref 0–0.04)
IMM GRANULOCYTES NFR BLD AUTO: 0.4 % (ref 0–0.5)
INR PPP: 0.9 (ref 0.8–1.2)
LYMPHOCYTES # BLD AUTO: 2.6 K/UL (ref 1–4.8)
LYMPHOCYTES NFR BLD: 23.7 % (ref 18–48)
MCH RBC QN AUTO: 32.5 PG (ref 27–31)
MCHC RBC AUTO-ENTMCNC: 33.2 G/DL (ref 32–36)
MCV RBC AUTO: 98 FL (ref 82–98)
MONOCYTES # BLD AUTO: 0.8 K/UL (ref 0.3–1)
MONOCYTES NFR BLD: 6.7 % (ref 4–15)
NEUTROPHILS # BLD AUTO: 7.5 K/UL (ref 1.8–7.7)
NEUTROPHILS NFR BLD: 67.7 % (ref 38–73)
NRBC BLD-RTO: 0 /100 WBC
PLATELET # BLD AUTO: 290 K/UL (ref 150–450)
PMV BLD AUTO: 8.3 FL (ref 9.2–12.9)
PROTHROMBIN TIME: 9.8 SEC (ref 9–12.5)
RBC # BLD AUTO: 4.43 M/UL (ref 4–5.4)
WBC # BLD AUTO: 11.13 K/UL (ref 3.9–12.7)

## 2022-12-27 PROCEDURE — 93010 EKG 12-LEAD: ICD-10-PCS | Mod: ,,, | Performed by: SPECIALIST

## 2022-12-27 PROCEDURE — 85025 COMPLETE CBC W/AUTO DIFF WBC: CPT | Performed by: ANESTHESIOLOGY

## 2022-12-27 PROCEDURE — 85730 THROMBOPLASTIN TIME PARTIAL: CPT | Performed by: ANESTHESIOLOGY

## 2022-12-27 PROCEDURE — 85610 PROTHROMBIN TIME: CPT | Performed by: ANESTHESIOLOGY

## 2022-12-27 PROCEDURE — 93010 ELECTROCARDIOGRAM REPORT: CPT | Mod: ,,, | Performed by: SPECIALIST

## 2022-12-27 PROCEDURE — 93005 ELECTROCARDIOGRAM TRACING: CPT | Performed by: SPECIALIST

## 2022-12-27 PROCEDURE — 36415 COLL VENOUS BLD VENIPUNCTURE: CPT | Performed by: ANESTHESIOLOGY

## 2022-12-27 RX ORDER — AMOXICILLIN 500 MG
1 CAPSULE ORAL DAILY
COMMUNITY

## 2022-12-27 NOTE — DISCHARGE INSTRUCTIONS
To confirm, Your doctor has instructed you that surgery is scheduled for: Thursday 12/29/22    Pre-Op will call the afternoon prior to surgery between 4:00 and 6:00 PM with the final arrival time.  Wednesday 12/28/22    Please report to Registration at front of the hospital --it is best to park in the garage on [address] [facility]    Do not eat or drink anything after midnight the night before your surgery - THIS INCLUDES  WATER, GUM, MINTS AND CANDY.    YOU MAY BRUSH YOUR TEETH     TAKE APPROVED  MEDICATIONS WITH A SMALL SIP OF WATER THE MORNING OF YOUR PROCEDURE: See Medication list    PLEASE NOTE:  The surgery schedule has many variables which may affect the time of your surgery case.  Family members should be available if your surgery time changes.  Plan to be here the day of your procedure between 4-6 hours.    DO NOT TAKE THESE MEDICATIONS 5-7 DAYS PRIOR to your procedure or per your surgeon's request: ASPIRIN, ALEVE, ADVIL, IBUPROFEN,  LEANDRA SELTZER, BC , FISH OIL , VITAMIN E, HERBALS  (May take Tylenol)      IMPORTANT INSTRUCTIONS    Do not smoke, vape or drink alcoholic beverages 24 hours prior to your procedure.  Shower the night before AND the morning of your procedure with a Chlorhexidine wash such as Hibiclens or Dial antibacterial soap from the neck down.    Do not get it on your face or in your eyes.  You may use your own shampoo and face wash. This helps your skin to be as bacteria free as possible.   Do not apply any deodorant, lotion or powder after you shower.   DO NOT remove hair from the surgery site.  Do not shave the incision site unless you are given specific instructions to do so.    Sleep in a bed with clean sheets.  Do not sleep with a pet in the bed.   If you wear contact lenses, dentures, hearing aids or glasses, bring a container to put them in during surgery and give to a family member for safe keeping.    Please leave all jewelry, piercing's and valuables at home.   If your doctor has scheduled  you for an overnight stay, bring a small overnight bag with any personal items you need.  Wear comfortable clothing that is easy to remove and place back on after surgery.     Make arrangements in advance for transportation home by a responsible adult.    You must make arrangements for transportation, TAXI'S, UBER'S OR LYFTS ARE NOT ALLOWED.      If you have any questions about these instructions, call Pre-Op Admit  Nursing at [phone] , Endo [phone]

## 2022-12-29 ENCOUNTER — ANESTHESIA (OUTPATIENT)
Dept: SURGERY | Facility: HOSPITAL | Age: 42
End: 2022-12-29
Payer: MEDICARE

## 2022-12-29 ENCOUNTER — HOSPITAL ENCOUNTER (OUTPATIENT)
Facility: HOSPITAL | Age: 42
Discharge: HOME | End: 2022-12-29
Attending: INTERNAL MEDICINE | Admitting: INTERNAL MEDICINE
Payer: MEDICARE

## 2022-12-29 ENCOUNTER — ANESTHESIA EVENT (OUTPATIENT)
Dept: SURGERY | Facility: HOSPITAL | Age: 42
End: 2022-12-29
Payer: MEDICARE

## 2022-12-29 VITALS
RESPIRATION RATE: 14 BRPM | HEIGHT: 64 IN | BODY MASS INDEX: 37.56 KG/M2 | TEMPERATURE: 98 F | DIASTOLIC BLOOD PRESSURE: 80 MMHG | WEIGHT: 220 LBS | SYSTOLIC BLOOD PRESSURE: 130 MMHG | OXYGEN SATURATION: 99 % | HEART RATE: 82 BPM

## 2022-12-29 VITALS
OXYGEN SATURATION: 94 % | RESPIRATION RATE: 13 BRPM | SYSTOLIC BLOOD PRESSURE: 107 MMHG | HEART RATE: 100 BPM | DIASTOLIC BLOOD PRESSURE: 53 MMHG

## 2022-12-29 DIAGNOSIS — Z85.038 PERSONAL HISTORY OF COLON CANCER: ICD-10-CM

## 2022-12-29 PROCEDURE — 37000009 HC ANESTHESIA EA ADD 15 MINS: Performed by: INTERNAL MEDICINE

## 2022-12-29 PROCEDURE — 37000008 HC ANESTHESIA 1ST 15 MINUTES: Performed by: INTERNAL MEDICINE

## 2022-12-29 PROCEDURE — 63600175 PHARM REV CODE 636 W HCPCS: Performed by: NURSE ANESTHETIST, CERTIFIED REGISTERED

## 2022-12-29 PROCEDURE — 45378 DIAGNOSTIC COLONOSCOPY: CPT | Performed by: INTERNAL MEDICINE

## 2022-12-29 RX ORDER — PROPOFOL 10 MG/ML
VIAL (ML) INTRAVENOUS
Status: DISCONTINUED | OUTPATIENT
Start: 2022-12-29 | End: 2022-12-29

## 2022-12-29 RX ADMIN — PROPOFOL 100 MG: 10 INJECTION, EMULSION INTRAVENOUS at 10:12

## 2022-12-29 RX ADMIN — SODIUM CHLORIDE, SODIUM LACTATE, POTASSIUM CHLORIDE, AND CALCIUM CHLORIDE: .6; .31; .03; .02 INJECTION, SOLUTION INTRAVENOUS at 09:12

## 2022-12-29 RX ADMIN — PROPOFOL 100 MG: 10 INJECTION, EMULSION INTRAVENOUS at 09:12

## 2022-12-29 NOTE — ANESTHESIA POSTPROCEDURE EVALUATION
Anesthesia Post Evaluation    Patient: Sergio Clifford    Procedure(s) Performed: Procedure(s) (LRB):  COLONOSCOPY (N/A)    Final Anesthesia Type: MAC      Patient location during evaluation: GI PACU  Patient participation: Yes- Able to Participate  Level of consciousness: awake and alert  Post-procedure vital signs: reviewed and stable  Pain management: adequate  Airway patency: patent    PONV status at discharge: No PONV  Anesthetic complications: no      Cardiovascular status: stable  Respiratory status: unassisted  Hydration status: euvolemic  Follow-up not needed.          Vitals Value Taken Time   /80 12/29/22 1050   Temp 36.7 °C (98.1 °F) 12/29/22 1030   Pulse 82 12/29/22 1058   Resp Using 12/29/22 1102   SpO2 99 % 12/29/22 1058   Vitals shown include unvalidated device data.      No case tracking events are documented in the log.      Pain/Corazon Score: No data recorded

## 2022-12-29 NOTE — TRANSFER OF CARE
"Anesthesia Transfer of Care Note    Patient: Sergio Clifford    Procedure(s) Performed: Procedure(s) (LRB):  COLONOSCOPY (N/A)    Patient location: GI    Anesthesia Type: MAC    Transport from OR: Transported from OR on room air with adequate spontaneous ventilation    Post pain: adequate analgesia    Post assessment: no apparent anesthetic complications    Post vital signs: stable    Level of consciousness: responds to stimulation    Complications: none    Transfer of care protocol was followed      Last vitals:   Visit Vitals  /82 (BP Location: Left arm, Patient Position: Lying)   Pulse 92   Temp 36.9 °C (98.5 °F) (Oral)   Resp 16   Ht 5' 4" (1.626 m)   Wt 99.8 kg (220 lb)   LMP 12/22/2022 (Exact Date)   SpO2 98%   Breastfeeding No   BMI 37.76 kg/m²     "

## 2022-12-29 NOTE — ANESTHESIA PREPROCEDURE EVALUATION
12/29/2022  Sergio Clifford is a 42 y.o., female.  Patient Active Problem List   Diagnosis    Nondisplaced fracture of fourth metatarsal bone, left foot, initial encounter for closed fracture    Nondisplaced fracture of third metatarsal bone, left foot, initial encounter for closed fracture    Malignant neoplasm of overlapping sites of breast in female, estrogen receptor negative    Chemotherapy induced neutropenia    Family history of malignant neoplasm of digestive organs    Iron deficiency anemia due to chronic blood loss    Dyslipidemia    Palpitations       Past Surgical History:   Procedure Laterality Date    BILATERAL MASTECTOMY  06/04/2020    BRAIN AVM REPAIR  2006    BREAST BIOPSY  left    2002    BREAST RECONSTRUCTION  06/04/2020    BREAST RECONSTRUCTION  06/04/2020    COLONOSCOPY N/A 01/13/2020    Procedure: COLONOSCOPY;  Surgeon: Librado Monroe MD;  Location: [facility] ENDO;  Service: Endoscopy;  Laterality: N/A;    CRANIOTOMY  2006    CVA tumor removal  2006    ESOPHAGOGASTRODUODENOSCOPY N/A 01/13/2020    Procedure: EGD (ESOPHAGOGASTRODUODENOSCOPY);  Surgeon: Librado Monroe MD;  Location: [facility] ENDO;  Service: Endoscopy;  Laterality: N/A;    FOOT FRACTURE SURGERY Left     INSERTION OF CATHETER Right 09/30/2019    Procedure: INSERTION, CATHETER;  Surgeon: Caitlyn Elkins MD;  Location: [facility] OR;  Service: General;  Laterality: Right;    removal port of cath  2020    REVISION OF VASCULAR ACCESS PORT Right 12/13/2019    Procedure: REVISION, VASCULAR ACCESS PORT;  Surgeon: Caitlyn Elkins MD;  Location: [facility] OR;  Service: General;  Laterality: Right;  WOUND OPENED AND PORT FLIPPED.    VENTRICULOPERITONEAL SHUNT  2006    MAGNETIC - NO MRI        Tobacco Use:  The patient  reports that she quit smoking about 10 years ago. Her smoking use included cigarettes. She has a 1.00 pack-year  smoking history. She has never used smokeless tobacco.     Results for orders placed or performed during the hospital encounter of 12/27/22   EKG 12-lead    Collection Time: 12/27/22  2:15 PM    Narrative    Test Reason : Z01.818,    Vent. Rate : 082 BPM     Atrial Rate : 082 BPM     P-R Int : 158 ms          QRS Dur : 074 ms      QT Int : 362 ms       P-R-T Axes : 047 048 037 degrees     QTc Int : 422 ms    Normal sinus rhythm  Normal ECG  When compared with ECG of 31-MAY-2022 17:23,  No significant change was found  Confirmed by Aquiles Hurley MD [id number]) on 12/28/2022 2:56:12 PM    Referred By:             Confirmed By:Aquiles Hurley MD             Lab Results   Component Value Date    WBC 11.13 12/27/2022    HGB 14.4 12/27/2022    HCT 43.4 12/27/2022    MCV 98 12/27/2022     12/27/2022     BMP  Lab Results   Component Value Date     02/10/2020    K 4.2 02/10/2020     02/10/2020    CO2 27 02/10/2020    BUN 13 02/10/2020    CREATININE 0.6 02/10/2020    CALCIUM 9.3 02/10/2020    ANIONGAP 12 02/10/2020    ESTGFRAFRICA >60.0 02/10/2020    EGFRNONAA >60.0 02/10/2020                                                                                                                    12/13/2019  Sergio Clifford is a 39 y.o., female.    Patient Active Problem List   Diagnosis    Nondisplaced fracture of fourth metatarsal bone, left foot, initial encounter for closed fracture    Nondisplaced fracture of third metatarsal bone, left foot, initial encounter for closed fracture    Malignant neoplasm of overlapping sites of breast in female, estrogen receptor negative    Chemotherapy induced neutropenia       Past Surgical History:   Procedure Laterality Date    BRAIN AVM REPAIR  2006    BREAST BIOPSY  left    2002    CRANIOTOMY  2006    CVA tumor removal  2006    FOOT FRACTURE SURGERY      INSERTION OF CATHETER Right 9/30/2019    Procedure: INSERTION, CATHETER;  Surgeon: Caitlyn Elkins MD;   Location: [facility] OR;  Service: General;  Laterality: Right;    VENTRICULOPERITONEAL SHUNT  2006    MAGNETIC - NO MRI          Results for orders placed or performed during the hospital encounter of 09/27/19   EKG 12-lead    Collection Time: 09/27/19  9:20 AM    Narrative    Test Reason : Z01.818,    Vent. Rate : 073 BPM     Atrial Rate : 073 BPM     P-R Int : 150 ms          QRS Dur : 074 ms      QT Int : 386 ms       P-R-T Axes : 032 044 036 degrees     QTc Int : 425 ms    Normal sinus rhythm  Normal ECG  When compared with ECG of 16-OCT-2012 13:48,  No significant change was found  Confirmed by Annmarie Willis MD ([id number]) on 9/27/2019 7:55:21 PM    Referred By: СВЕТЛАНА ELDER           Confirmed By:Annmarie Willis MD             Lab Results   Component Value Date    WBC 12.25 12/12/2019    HGB 12.0 12/12/2019    HCT 37.1 12/12/2019    MCV 96 12/12/2019     12/12/2019     BMP  Lab Results   Component Value Date     12/12/2019    K 3.6 12/12/2019     12/12/2019    CO2 29 12/12/2019    BUN 14 12/12/2019    CREATININE 0.6 12/12/2019    CALCIUM 9.5 12/12/2019    ANIONGAP 8 12/12/2019    ESTGFRAFRICA >60.0 12/12/2019    EGFRNONAA >60.0 12/12/2019             Pre-op Assessment    I have reviewed the Patient Summary Reports.     I have reviewed the Nursing Notes.   I have reviewed the Medications.   Decadron    Review of Systems  Anesthesia Hx:  No problems with previous Anesthesia Denies Hx of Anesthetic complications  History of prior surgery of interest to airway management or planning: Previous anesthesia: General Denies Family Hx of Anesthesia complications.   Denies Personal Hx of Anesthesia complications.   Social:  Former Smoker, Alcohol Use    Hematology/Oncology:        Current/Recent Cancer. Breast left no axillary node dissection no lymphedema chemotherapy and surgery   EENT/Dental:EENT/Dental Normal   Cardiovascular:   Valvular problems/Murmurs, MVP ECG has been reviewed. Followed by   Mikie seen 6+ mo. Ago no change sin medicines or Tx.   Pulmonary:   Recent URI, resolved Bronchitis 2 mo. Ago without fever now   Residual non-productive cough at this time   Renal/:  Renal/ Normal     Hepatic/GI:   GERD, well controlled    Musculoskeletal:  Musculoskeletal Normal    Neurological:   CVA (h/o AVM, CVA 2006, s/p  shunt, uses walker for balance, good muscle strength), residual symptoms AVM s/p crainotomy   Patient with residual balance gait disturbance   Patient uses wqalker   Endocrine:  Endocrine Normal    Dermatological:  Skin Normal    Psych:   anxiety Claustrophobia         Physical Exam  General:  Well nourished, Obesity    Airway/Jaw/Neck:  Airway Findings: Mouth Opening: Normal Tongue: Normal  General Airway Assessment: Adult  Mallampati: III  Improves to III with phonation.  TM Distance: Normal, at least 6 cm  Jaw/Neck Findings:  Neck ROM: Normal ROM      Dental:  Dental Findings: (perm. lower bridge) In tact    Chest/Lungs:  Chest/Lungs Findings: Clear to auscultation, Normal Respiratory Rate     Heart/Vascular:  Heart Findings: Rate: Normal  Rhythm: Regular Rhythm  Sounds: Normal     Abdomen:  Abdomen Findings: Normal    Musculoskeletal:  Musculoskeletal Findings: Normal   Skin:  Skin Findings: Normal    Mental Status:  Mental Status Findings:  Cooperative, Alert and Oriented         Anesthesia Plan  Type of Anesthesia, risks & benefits discussed:  Anesthesia Type:  MAC  Patient's Preference:   Intra-op Monitoring Plan: standard ASA monitors  Intra-op Monitoring Plan Comments:   Post Op Pain Control Plan: per primary service following discharge from PACU  Post Op Pain Control Plan Comments:   Induction:   IV  Beta Blocker:  Patient is on a Beta-Blocker and has received one dose within the past 24 hours (No further documentation required).       Informed Consent: Patient understands risks and agrees with Anesthesia plan.  Questions answered. Anesthesia consent signed with  patient.  ASA Score: 3     Day of Surgery Review of History & Physical: I have interviewed and examined the patient. I have reviewed the patient's H&P dated:  There are no significant changes.      Anesthesia Plan Notes: MAC   Benadryl 6.25 mg iv  Decadron 8 mg iv   Zofran 4mg iv  Pepcid 20mg iv   Tylenol 1000 mg po   _________________              Pre-op Assessment    I have reviewed the Patient Summary Reports.    I have reviewed the Nursing Notes. I have reviewed the NPO Status.   I have reviewed the Medications.     Review of Systems  Anesthesia Hx:  No problems with previous Anesthesia  Denies Family Hx of Anesthesia complications.   Denies Personal Hx of Anesthesia complications.   Social:  Former Smoker, Social Alcohol Use    Hematology/Oncology:  Hematology Normal      Current/Recent Cancer. Breast left no axillary node dissection no lymphedema chemotherapy   EENT/Dental:EENT/Dental Normal   Cardiovascular:   Valvular problems/Murmurs (Takes metoprolol, no symptoms), MVP ECG has been reviewed.    Pulmonary:  Pulmonary Normal  Denies Sleep Apnea.    Renal/:  Renal/ Normal     Hepatic/GI:   GERD, well controlled Patient denies active N/V or intestinal obstruction   Musculoskeletal:  Musculoskeletal Normal    Neurological:   CVA ( Imbalance only residual deficit) Hx AVM and benign  Tumor removal  Unsteady Gait uses walker    Shunt present   Endocrine:  Endocrine Normal    Dermatological:  Skin Normal    Psych:  Psychiatric Normal           Physical Exam  General:  Obesity, Well nourished, Cooperative, Alert and Oriented      Airway/Jaw/Neck:  Airway Findings: Mouth Opening: Normal   Tongue: Normal   General Airway Assessment: Adult Mallampati: III  Improves to II with phonation.  TM Distance: < 4 cm   Jaw/Neck Findings:  Neck ROM: Normal ROM       Dental:  Dental Findings: Molar caps, In tact     Chest/Lungs:  Chest/Lungs Findings: Clear to auscultation, Normal Respiratory Rate      Heart/Vascular:  Heart  Findings: Rate: Normal  Rhythm: Regular Rhythm  Sounds: Normal  Heart murmur: negative Vascular Findings: Normal    Abdomen:  Abdomen Findings: Normal    Musculoskeletal:  Musculoskeletal Findings: Normal   Skin:  Skin Findings: Normal    Mental Status:  Mental Status Findings:  Cooperative, Alert and Oriented         Anesthesia Plan  Type of Anesthesia, risks & benefits discussed:  Anesthesia Type:  MAC    Patient's Preference: MAC  Plan Factors:          Intra-op Monitoring Plan: standard ASA monitors  Intra-op Monitoring Plan Comments:   Post Op Pain Control Plan: per primary service following discharge from PACU  Post Op Pain Control Plan Comments:     Induction:    Beta Blocker:  Patient is on a Beta-Blocker and has received one dose within the past 24 hours (No further documentation required).       Informed Consent: Informed consent signed with the Patient and all parties understand the risks and agree with anesthesia plan.  All questions answered.  Anesthesia consent signed with patient.  ASA Score: 3     Day of Surgery Review of History & Physical:              Ready For Surgery From Anesthesia Perspective.           Physical Exam  General: Obesity, Well nourished, Cooperative, Alert and Oriented    Airway:  Mallampati: III / II  Mouth Opening: Normal  TM Distance: < 4 cm  Tongue: Normal  Neck ROM: Normal ROM    Dental:  Molar caps, In tact    Chest/Lungs:  Clear to auscultation, Normal Respiratory Rate    Heart:  Rate: Normal  Rhythm: Regular Rhythm  Sounds: Normal          Anesthesia Plan  Type of Anesthesia, risks & benefits discussed:    Anesthesia Type: MAC  Intra-op Monitoring Plan: standard ASA monitors  Post Op Pain Control Plan: per primary service following discharge from PACU  Informed Consent: Informed consent signed with the Patient and all parties understand the risks and agree with anesthesia plan.  All questions answered.   ASA Score: 3    Ready For Surgery From Anesthesia Perspective.        .

## 2022-12-29 NOTE — H&P
GASTROENTEROLOGY PRE-PROCEDURE H&P NOTE  Patient Name: Sergio Clifford  Patient MRN: [id number]  Patient : 1980    Service date: 2022    PCP: LASHAUN Cho    No chief complaint on file.      HPI: Patient is a 42 y.o. female with PMHx as below here for evaluation of h/o poylps.      HPI  39 F with breast CA, father with CRC 47, brother with 5 colonoscopies for polyps before age 52 and sister with several colonoscopies before age 50 for polyps. concern for hereditary syndrome  -repeat colon in 2 years  -prn miralax  -gave flyer for genetic testing which she will review with her oncologist    Past Medical History:  Past Medical History:   Diagnosis Date    Anxiety     AVM (arteriovenous malformation)     Balance disorder     from AVM  uses walker    Breast cancer 2019    left    Edema     Fractures 2012    left foot    MVP (mitral valve prolapse)     Reflux     Seroma of breast     recurrent seroma to left breast.     Wears glasses         Past Surgical History:  Past Surgical History:   Procedure Laterality Date    BILATERAL MASTECTOMY  2020    BRAIN AVM REPAIR  2006    BREAST BIOPSY  left    2002    BREAST RECONSTRUCTION  2020    BREAST RECONSTRUCTION  2020    COLONOSCOPY N/A 2020    Procedure: COLONOSCOPY;  Surgeon: Librado Monroe MD;  Location: [facility] ENDO;  Service: Endoscopy;  Laterality: N/A;    CRANIOTOMY  2006    CVA tumor removal  2006    ESOPHAGOGASTRODUODENOSCOPY N/A 2020    Procedure: EGD (ESOPHAGOGASTRODUODENOSCOPY);  Surgeon: Librado Monroe MD;  Location: [facility] ENDO;  Service: Endoscopy;  Laterality: N/A;    FOOT FRACTURE SURGERY Left     INSERTION OF CATHETER Right 2019    Procedure: INSERTION, CATHETER;  Surgeon: Caitlyn Elkins MD;  Location: [facility] OR;  Service: General;  Laterality: Right;    removal port of cath      REVISION OF VASCULAR ACCESS PORT Right 2019    Procedure: REVISION, VASCULAR ACCESS PORT;   Surgeon: Caitlyn Elkins MD;  Location: [facility] OR;  Service: General;  Laterality: Right;  WOUND OPENED AND PORT FLIPPED.    VENTRICULOPERITONEAL SHUNT  2006    MAGNETIC - NO MRI        Home Medications:  Medications Prior to Admission   Medication Sig Dispense Refill Last Dose    buPROPion (WELLBUTRIN SR) 100 MG TBSR 12 hr tablet Take 100 mg by mouth once daily.   12/28/2022    DULoxetine (CYMBALTA) 60 MG capsule Take 60 mg by mouth once daily. Pt takes 30 mg   12/28/2022    esomeprazole (NEXIUM) 20 MG capsule Take 40 mg by mouth before breakfast.   12/29/2022 at 0745    metoprolol succinate (TOPROL-XL) 25 MG 24 hr tablet Take 1 tablet (25 mg total) by mouth once daily. 90 tablet 3 12/29/2022 at 0745    rosuvastatin (CRESTOR) 5 MG tablet Take 1 tablet (5 mg total) by mouth once daily. 90 tablet 3 12/28/2022    albuterol (VENTOLIN HFA) 90 mcg/actuation inhaler Inhale 1-2 puffs into the lungs every 6 (six) hours as needed for Wheezing or Shortness of Breath. Rescue 18 g 0 More than a month    ascorbic acid, vitamin C, (VITAMIN C) 1000 MG tablet Take 1,000 mg by mouth once daily.   12/27/2022    magnesium oxide 400 mg magnesium Cap Take 400 mg by mouth once daily.   12/27/2022    omega-3 fatty acids/fish oil (FISH OIL-OMEGA-3 FATTY ACIDS) 300-1,000 mg capsule Take 1 capsule by mouth once daily.   12/27/2022    vitamin D (VITAMIN D3) 1000 units Tab Take 1,000 Units by mouth once daily.   12/27/2022       Inpatient Medications:        Review of patient's allergies indicates:   Allergen Reactions    Adhesive tape-silicones Rash    Codeine Itching    Gabapentin Other (See Comments)     Vivid dreams    Meperidine Other (See Comments)     hallucinations  Other reaction(s): Other (See Comments)  Hallucinations    Adhesive Rash    Penicillins Hives and Rash    Ultram [tramadol] Nausea Only       Social History:   Social History     Occupational History    Not on file   Tobacco Use    Smoking status: Former     Packs/day: 0.25      "Years: 4.00     Pack years: 1.00     Types: Cigarettes     Quit date: 5/11/2012     Years since quitting: 10.6    Smokeless tobacco: Never   Substance and Sexual Activity    Alcohol use: Yes     Alcohol/week: 27.0 standard drinks     Types: 21 Glasses of wine, 4 Cans of beer, 2 Shots of liquor per week    Drug use: No    Sexual activity: Not on file       Family History:   Family History   Problem Relation Age of Onset    Diabetes Neg Hx        Review of Systems:  A 10 point review of systems was performed and was normal, except as mentioned in the HPI, including constitutional, HEENT, heme, lymph, cardiovascular, respiratory, gastrointestinal, genitourinary, neurologic, endocrine, psychiatric and musculoskeletal.      OBJECTIVE:    Physical Exam:  24 Hour Vital Sign Ranges: Temp:  [98.5 °F (36.9 °C)] 98.5 °F (36.9 °C)  Pulse:  [92] 92  Resp:  [16] 16  SpO2:  [98 %] 98 %  BP: (134)/(82) 134/82  Most recent vitals: /82 (BP Location: Left arm, Patient Position: Lying)   Pulse 92   Temp 98.5 °F (36.9 °C) (Oral)   Resp 16   Ht 5' 4" (1.626 m)   Wt 99.8 kg (220 lb)   LMP 12/22/2022 (Exact Date)   SpO2 98% Comment: ROOM AIR  Breastfeeding No   BMI 37.76 kg/m²    GEN: well-developed, well-nourished, awake and alert, non-toxic appearing adult  HEENT: PERRL, sclera anicteric, oral mucosa pink and moist without lesion  NECK: trachea midline; Good ROM  CV: regular rate and rhythm, no murmurs or gallops  RESP: clear to auscultation bilaterally, no wheezes, rhonci or rales  ABD: soft, non-tender, non-distended, normal bowel sounds  EXT: no swelling or edema, 2+ pulses distally  SKIN: no rashes or jaundice  PSYCH: normal affect    Labs:   Recent Labs     12/27/22  1445   WBC 11.13   MCV 98        No results for input(s): NA, K, CL, CO2, BUN, GLU in the last 72 hours.    Invalid input(s): CREA  No results for input(s): ALB in the last 72 hours.    Invalid input(s): ALKP, SGOT, SGPT, TBIL, DBIL, TPRO  Recent " Labs     12/27/22  1445   INR 0.9         IMPRESSION / RECOMMENDATIONS:  H/o polyps  Family history of polyps  Cscope with interventions as warranted. Recommend genetics testing     RIsks, benefits, alternatives discussed in detail regarding upcoming procedures and sedation. Some of the more common endoscopic complications include but not limited to immediate or delayed perforation, bleeding, infections, pain, inadvertent injury to surrounding tissue / organs and possible need for surgical evaluation. Patient expressed understanding, all questions answered and will proceed with procedure as planned.     Librado Monroe  12/29/2022  9:44 AM

## 2022-12-29 NOTE — [employer] PATIENT INSTRUCTIONS
Discharge Summary/Instructions after an Endoscopic Procedure  Patient Name: Sergio Clifford  Patient MRN: [id number]  Patient YOB: 1980  Thursday, December 29, 2022  Librado Monroe MD  RESTRICTIONS:  During your procedure today, you received medications for sedation.  These   medications may affect your judgment, balance and coordination.  Therefore,   for 24 hours, you have the following restrictions:   - DO NOT drive a car, operate machinery, make legal/financial decisions,   sign important papers or drink alcohol.    ACTIVITY:  Today: no heavy lifting, straining or running due to procedural   sedation/anesthesia.  The following day: return to full activity including work.  DIET:  Eat and drink normally unless instructed otherwise.     TREATMENT FOR COMMON SIDE EFFECTS:  - Mild abdominal pain, nausea, belching, bloating or excessive gas:  rest,   eat lightly and use a heating pad.  - Sore Throat: treat with throat lozenges and/or gargle with warm salt   water.  - Because air was used during the procedure, expelling large amounts of air   from your rectum or belching is normal.  - If a bowel prep was taken, you may not have a bowel movement for 1-3 days.    This is normal.  SYMPTOMS TO WATCH FOR AND REPORT TO YOUR PHYSICIAN:  1. Abdominal pain or bloating, other than gas cramps.  2. Chest pain.  3. Back pain.  4. Signs of infection such as: chills or fever occurring within 24 hours   after the procedure.  5. Rectal bleeding, which would show as bright red, maroon, or black stools.   (A tablespoon of blood from the rectum is not serious, especially if   hemorrhoids are present.)  6. Vomiting.  7. Weakness or dizziness.  GO DIRECTLY TO THE NEAREST EMERGENCY ROOM IF YOU HAVE ANY OF THE FOLLOWING:      Difficulty breathing              Chills and/or fever over 101 F   Persistent vomiting and/or vomiting blood   Severe abdominal pain   Severe chest pain   Black, tarry stools   Bleeding- more than one  tablespoon   Any other symptom or condition that you feel may need urgent attention  Your doctor recommends these additional instructions:  If any biopsies were taken, your doctors clinic will contact you in 1 to 2   weeks with any results.  - Patient has a contact number available for emergencies.  The signs and   symptoms of potential delayed complications were discussed with the   patient.  Return to normal activities tomorrow.  Written discharge   instructions were provided to the patient.   - Resume previous diet.   - Continue present medications.   - Repeat colonoscopy in 2 years for surveillance for now pending results of   genetics testing.   - Return to GI clinic in 4 weeks.   - Discharge patient to home (with escort).  For questions, problems or results please call your physician - Librado Monroe MD at Work:  [phone].  [facility], EMERGENCY ROOM PHONE NUMBER: [phone]  IF A COMPLICATION OR EMERGENCY SITUATION ARISES AND YOU ARE UNABLE TO REACH   YOUR PHYSICIAN - GO DIRECTLY TO THE EMERGENCY ROOM.  MD Librado Zhang MD  12/29/2022 10:23:54 AM  This report has been verified and signed electronically.  Dear patient,  As a result of recent federal legislation (The Federal Cures Act), you may   receive lab or pathology results from your procedure in your [facility]   account before your physician is able to contact you. Your physician or   their representative will relay the results to you with their   recommendations at their soonest availability.  Thank you,  [employer]

## 2023-01-13 ENCOUNTER — TELEPHONE (OUTPATIENT)
Dept: CARDIOLOGY | Facility: CLINIC | Age: 43
End: 2023-01-13
Payer: MEDICARE

## 2023-01-13 NOTE — TELEPHONE ENCOUNTER
----- Message from Bernard Morrison sent at 1/13/2023  2:20 PM CST -----  Contact: Nevin @ [phone]  Type: Needs Medical Advice  Who Called:  nevin  Best Call Back Number: [phone]    Additional Information: Pt is calling office to request lab orders for tomorrow due to her appt on 1-17. Please call back and advise.

## 2023-01-16 ENCOUNTER — LAB VISIT (OUTPATIENT)
Dept: LAB | Facility: HOSPITAL | Age: 43
End: 2023-01-16
Attending: INTERNAL MEDICINE
Payer: MEDICARE

## 2023-01-16 DIAGNOSIS — E78.5 HYPERLIPEMIA: Primary | ICD-10-CM

## 2023-01-16 DIAGNOSIS — D50.0 IRON DEFICIENCY ANEMIA SECONDARY TO BLOOD LOSS (CHRONIC): ICD-10-CM

## 2023-01-16 DIAGNOSIS — C50.812 MALIGNANT NEOPLASM OF OVERLAPPING SITES OF LEFT FEMALE BREAST: ICD-10-CM

## 2023-01-16 DIAGNOSIS — Z17.1 ESTROGEN RECEPTOR NEGATIVE STATUS (ER-): ICD-10-CM

## 2023-01-16 LAB
ALBUMIN SERPL BCP-MCNC: 4 G/DL (ref 3.5–5.2)
ALP SERPL-CCNC: 56 U/L (ref 55–135)
ALT SERPL W/O P-5'-P-CCNC: 16 U/L (ref 10–44)
ANION GAP SERPL CALC-SCNC: 10 MMOL/L (ref 8–16)
AST SERPL-CCNC: 14 U/L (ref 10–40)
BASOPHILS # BLD AUTO: 0.04 K/UL (ref 0–0.2)
BASOPHILS NFR BLD: 0.5 % (ref 0–1.9)
BILIRUB SERPL-MCNC: 0.7 MG/DL (ref 0.1–1)
BUN SERPL-MCNC: 11 MG/DL (ref 6–20)
CALCIUM SERPL-MCNC: 9.5 MG/DL (ref 8.7–10.5)
CHLORIDE SERPL-SCNC: 99 MMOL/L (ref 95–110)
CHOLEST SERPL-MCNC: 170 MG/DL (ref 120–199)
CHOLEST/HDLC SERPL: 2.6 {RATIO} (ref 2–5)
CO2 SERPL-SCNC: 29 MMOL/L (ref 23–29)
CREAT SERPL-MCNC: 0.7 MG/DL (ref 0.5–1.4)
DIFFERENTIAL METHOD: ABNORMAL
EOSINOPHIL # BLD AUTO: 0.1 K/UL (ref 0–0.5)
EOSINOPHIL NFR BLD: 1 % (ref 0–8)
ERYTHROCYTE [DISTWIDTH] IN BLOOD BY AUTOMATED COUNT: 12.6 % (ref 11.5–14.5)
EST. GFR  (NO RACE VARIABLE): >60 ML/MIN/1.73 M^2
GLUCOSE SERPL-MCNC: 101 MG/DL (ref 70–110)
HCT VFR BLD AUTO: 42 % (ref 37–48.5)
HDLC SERPL-MCNC: 66 MG/DL (ref 40–75)
HDLC SERPL: 38.8 % (ref 20–50)
HGB BLD-MCNC: 13.7 G/DL (ref 12–16)
IMM GRANULOCYTES # BLD AUTO: 0.03 K/UL (ref 0–0.04)
IMM GRANULOCYTES NFR BLD AUTO: 0.4 % (ref 0–0.5)
LDLC SERPL CALC-MCNC: 81.6 MG/DL (ref 63–159)
LYMPHOCYTES # BLD AUTO: 1.8 K/UL (ref 1–4.8)
LYMPHOCYTES NFR BLD: 22.5 % (ref 18–48)
MCH RBC QN AUTO: 32.2 PG (ref 27–31)
MCHC RBC AUTO-ENTMCNC: 32.6 G/DL (ref 32–36)
MCV RBC AUTO: 99 FL (ref 82–98)
MONOCYTES # BLD AUTO: 0.7 K/UL (ref 0.3–1)
MONOCYTES NFR BLD: 8.7 % (ref 4–15)
NEUTROPHILS # BLD AUTO: 5.3 K/UL (ref 1.8–7.7)
NEUTROPHILS NFR BLD: 66.9 % (ref 38–73)
NONHDLC SERPL-MCNC: 104 MG/DL
NRBC BLD-RTO: 0 /100 WBC
PLATELET # BLD AUTO: 280 K/UL (ref 150–450)
PMV BLD AUTO: 8.3 FL (ref 9.2–12.9)
POTASSIUM SERPL-SCNC: 4.8 MMOL/L (ref 3.5–5.1)
PROT SERPL-MCNC: 7.4 G/DL (ref 6–8.4)
RBC # BLD AUTO: 4.26 M/UL (ref 4–5.4)
SODIUM SERPL-SCNC: 138 MMOL/L (ref 136–145)
TRIGL SERPL-MCNC: 112 MG/DL (ref 30–150)
WBC # BLD AUTO: 7.95 K/UL (ref 3.9–12.7)

## 2023-01-16 PROCEDURE — 80053 COMPREHEN METABOLIC PANEL: CPT | Performed by: INTERNAL MEDICINE

## 2023-01-16 PROCEDURE — 80061 LIPID PANEL: CPT | Performed by: INTERNAL MEDICINE

## 2023-01-16 PROCEDURE — 36415 COLL VENOUS BLD VENIPUNCTURE: CPT | Performed by: INTERNAL MEDICINE

## 2023-01-16 PROCEDURE — 85025 COMPLETE CBC W/AUTO DIFF WBC: CPT | Performed by: INTERNAL MEDICINE

## 2023-01-17 ENCOUNTER — OFFICE VISIT (OUTPATIENT)
Dept: CARDIOLOGY | Facility: CLINIC | Age: 43
End: 2023-01-17
Payer: MEDICARE

## 2023-01-17 VITALS
DIASTOLIC BLOOD PRESSURE: 64 MMHG | RESPIRATION RATE: 16 BRPM | BODY MASS INDEX: 38.07 KG/M2 | HEART RATE: 83 BPM | OXYGEN SATURATION: 98 % | SYSTOLIC BLOOD PRESSURE: 112 MMHG | HEIGHT: 64 IN | WEIGHT: 223 LBS

## 2023-01-17 DIAGNOSIS — R00.2 PALPITATIONS: ICD-10-CM

## 2023-01-17 DIAGNOSIS — E78.5 DYSLIPIDEMIA: ICD-10-CM

## 2023-01-17 DIAGNOSIS — F41.1 GENERALIZED ANXIETY DISORDER: ICD-10-CM

## 2023-01-17 PROCEDURE — 99999 PR PBB SHADOW E&M-EST. PATIENT-LVL IV: ICD-10-PCS | Mod: PBBFAC,,, | Performed by: INTERNAL MEDICINE

## 2023-01-17 PROCEDURE — 99213 PR OFFICE/OUTPT VISIT, EST, LEVL III, 20-29 MIN: ICD-10-PCS | Mod: S$PBB,,, | Performed by: INTERNAL MEDICINE

## 2023-01-17 PROCEDURE — 99213 OFFICE O/P EST LOW 20 MIN: CPT | Mod: S$PBB,,, | Performed by: INTERNAL MEDICINE

## 2023-01-17 PROCEDURE — 99214 OFFICE O/P EST MOD 30 MIN: CPT | Mod: PBBFAC,PN | Performed by: INTERNAL MEDICINE

## 2023-01-17 PROCEDURE — 99999 PR PBB SHADOW E&M-EST. PATIENT-LVL IV: CPT | Mod: PBBFAC,,, | Performed by: INTERNAL MEDICINE

## 2023-01-17 RX ORDER — ROSUVASTATIN CALCIUM 5 MG/1
5 TABLET, COATED ORAL DAILY
Qty: 90 TABLET | Refills: 3 | Status: SHIPPED | OUTPATIENT
Start: 2023-01-17 | End: 2024-03-14 | Stop reason: SDUPTHER

## 2023-01-17 RX ORDER — METOPROLOL SUCCINATE 25 MG/1
25 TABLET, EXTENDED RELEASE ORAL DAILY
Qty: 90 TABLET | Refills: 3 | Status: SHIPPED | OUTPATIENT
Start: 2023-01-17 | End: 2024-01-31

## 2023-01-17 NOTE — ASSESSMENT & PLAN NOTE
Maintain on low-fat low-cholesterol diet   Latest Reference Range & Units 01/16/23 11:05   Cholesterol 120 - 199 mg/dL 170   HDL 40 - 75 mg/dL 66   HDL/Cholesterol Ratio 20.0 - 50.0 % 38.8   LDL Cholesterol External 63.0 - 159.0 mg/dL 81.6   Non-HDL Cholesterol mg/dL 104   Total Cholesterol/HDL Ratio 2.0 - 5.0  2.6   Triglycerides 30 - 150 mg/dL 112     Continue on Crestor at 5 mg daily maintain low-fat low-cholesterol diet

## 2023-01-17 NOTE — ASSESSMENT & PLAN NOTE
Palpitations controlled on low doses of metoprolol succinate 25 mg once daily along with magnesium supplements 400 mg a day.

## 2023-01-17 NOTE — PROGRESS NOTES
Subjective:    Patient ID:  Sergio Clifford is a 42 y.o. female patient here for evaluation Follow-up and Results (Labs in epic)      History of Present Illness:   Patient is a 42-year-old here for follow-up evaluation for her palpitations and dyslipidemia,   doing well with no new symptoms of angina arm neck or jaw pain.  And she is exercising regularly with her sister previously with a .  During this physical activities no new symptoms of shortness of breath palpitations dizziness lightheadedness noted.  She is also maintaining compliance with the diet and her medications.          Review of patient's allergies indicates:   Allergen Reactions    Adhesive tape-silicones Rash    Codeine Itching    Gabapentin Other (See Comments)     Vivid dreams    Meperidine Other (See Comments)     hallucinations  Other reaction(s): Other (See Comments)  Hallucinations    Adhesive Rash    Penicillins Hives and Rash    Ultram [tramadol] Nausea Only       Past Medical History:   Diagnosis Date    Anxiety 2006    AVM (arteriovenous malformation) 2006    Balance disorder 2006    from AVM  uses walker    Breast cancer 08/2019    left    Edema     Fractures 2012    left foot    MVP (mitral valve prolapse) 1997    Reflux     Seroma of breast     recurrent seroma to left breast.     Wears glasses      Past Surgical History:   Procedure Laterality Date    BILATERAL MASTECTOMY  06/04/2020    BRAIN AVM REPAIR  2006    BREAST BIOPSY  left    2002    BREAST RECONSTRUCTION  06/04/2020    BREAST RECONSTRUCTION  06/04/2020    COLONOSCOPY N/A 01/13/2020    Procedure: COLONOSCOPY;  Surgeon: Librado Monroe MD;  Location: [facility];  Service: Endoscopy;  Laterality: N/A;    COLONOSCOPY N/A 12/29/2022    Procedure: COLONOSCOPY;  Surgeon: Librado Monroe MD;  Location: [facility];  Service: Endoscopy;  Laterality: N/A;    CRANIOTOMY  2006    CVA tumor removal  2006    ESOPHAGOGASTRODUODENOSCOPY N/A 01/13/2020    Procedure: EGD  (ESOPHAGOGASTRODUODENOSCOPY);  Surgeon: Librado Monroe MD;  Location: [facility] ENDO;  Service: Endoscopy;  Laterality: N/A;    FOOT FRACTURE SURGERY Left     INSERTION OF CATHETER Right 09/30/2019    Procedure: INSERTION, CATHETER;  Surgeon: Ciatlyn Elkins MD;  Location: [facility] OR;  Service: General;  Laterality: Right;    removal port of cath  2020    REVISION OF VASCULAR ACCESS PORT Right 12/13/2019    Procedure: REVISION, VASCULAR ACCESS PORT;  Surgeon: Caitlyn Elkins MD;  Location: [facility] OR;  Service: General;  Laterality: Right;  WOUND OPENED AND PORT FLIPPED.    VENTRICULOPERITONEAL SHUNT  2006    MAGNETIC - NO MRI     Social History     Tobacco Use    Smoking status: Former     Packs/day: 0.25     Years: 4.00     Pack years: 1.00     Types: Cigarettes     Quit date: 5/11/2012     Years since quitting: 10.6    Smokeless tobacco: Never   Substance Use Topics    Alcohol use: Yes     Alcohol/week: 27.0 standard drinks     Types: 21 Glasses of wine, 4 Cans of beer, 2 Shots of liquor per week    Drug use: No        Review of Systems:    As noted in HPI in addition      REVIEW OF SYSTEMS  CARDIOVASCULAR: No recent chest pain, palpitations, arm, neck, or jaw pain  RESPIRATORY: No recent fever, cough chills, SOB or congestion  : No blood in the urine  GI: No Nausea, vomiting, constipation, diarrhea, blood, or reflux.  MUSCULOSKELETAL: No myalgias  NEURO: No lightheadedness or dizziness  EYES: No Double vision, blurry, vision or headache              Objective        Vitals:    01/17/23 1445   BP: 112/64   Pulse: 83   Resp: 16       LIPIDS - LAST 2   Lab Results   Component Value Date    CHOL 170 01/16/2023    HDL 66 01/16/2023    LDLCALC 81.6 01/16/2023    TRIG 112 01/16/2023    CHOLHDL 38.8 01/16/2023       CBC - LAST 2  Lab Results   Component Value Date    WBC 7.95 01/16/2023    WBC 11.13 12/27/2022    RBC 4.26 01/16/2023    RBC 4.43 12/27/2022    HGB 13.7 01/16/2023    HGB 14.4 12/27/2022    HCT 42.0 01/16/2023    HCT  43.4 12/27/2022    MCV 99 (H) 01/16/2023    MCV 98 12/27/2022    MCH 32.2 (H) 01/16/2023    MCH 32.5 (H) 12/27/2022    MCHC 32.6 01/16/2023    MCHC 33.2 12/27/2022    RDW 12.6 01/16/2023    RDW 12.7 12/27/2022     01/16/2023     12/27/2022    MPV 8.3 (L) 01/16/2023    MPV 8.3 (L) 12/27/2022    GRAN 5.3 01/16/2023    GRAN 66.9 01/16/2023    LYMPH 1.8 01/16/2023    LYMPH 22.5 01/16/2023    MONO 0.7 01/16/2023    MONO 8.7 01/16/2023    BASO 0.04 01/16/2023    BASO 0.04 12/27/2022    NRBC 0 01/16/2023    NRBC 0 12/27/2022       CHEMISTRY & LIVER FUNCTION - LAST 2  Lab Results   Component Value Date     01/16/2023     02/10/2020    K 4.8 01/16/2023    K 4.2 02/10/2020    CL 99 01/16/2023     02/10/2020    CO2 29 01/16/2023    CO2 27 02/10/2020    ANIONGAP 10 01/16/2023    ANIONGAP 12 02/10/2020    BUN 11 01/16/2023    BUN 13 02/10/2020    CREATININE 0.7 01/16/2023    CREATININE 0.6 02/10/2020     01/16/2023     (H) 02/10/2020    CALCIUM 9.5 01/16/2023    CALCIUM 9.3 02/10/2020    ALBUMIN 4.0 01/16/2023    ALBUMIN 3.8 02/10/2020    PROT 7.4 01/16/2023    PROT 6.8 02/10/2020    ALKPHOS 56 01/16/2023    ALKPHOS 57 02/10/2020    ALT 16 01/16/2023    ALT 22 02/10/2020    AST 14 01/16/2023    AST 16 02/10/2020    BILITOT 0.7 01/16/2023    BILITOT 0.9 02/10/2020        CARDIAC PROFILE - LAST 2  No results found for: BNP, CPK, CPKMB, LDH, TROPONINI, TROPONINIHS     COAGULATION - LAST 2  Lab Results   Component Value Date    LABPT 12.5 11/05/2021    INR 0.9 12/27/2022    INR 1.0 11/05/2021    APTT 24.2 12/27/2022    APTT 25.5 11/05/2021       ENDOCRINE & PSA - LAST 2  No results found for: HGBA1C, MICROALBUR, TSH, PROCAL, PSA     ECHOCARDIOGRAM RESULTS  Results for orders placed in visit on 12/30/19    Echo Color Flow Doppler? Yes; Bubble Contrast? No    Interpretation Summary  · The left ventricular global longitudinal strain is --18%.< normal>  · Increased (hyperdynamic) left  ventricular systolic function. The estimated ejection fraction is 75%  · Normal LV diastolic function.  · No wall motion abnormalities.  · Normal right ventricular systolic function.  · Normal central venous pressure (3 mm Hg).      CURRENT/PREVIOUS VISIT EKG  Results for orders placed or performed during the hospital encounter of 12/27/22   EKG 12-lead    Collection Time: 12/27/22  2:15 PM    Narrative    Test Reason : Z01.818,    Vent. Rate : 082 BPM     Atrial Rate : 082 BPM     P-R Int : 158 ms          QRS Dur : 074 ms      QT Int : 362 ms       P-R-T Axes : 047 048 037 degrees     QTc Int : 422 ms    Normal sinus rhythm  Normal ECG  When compared with ECG of 31-MAY-2022 17:23,  No significant change was found  Confirmed by Xavi TAN, Aquiles HIRSCH ([id number]) on 12/28/2022 2:56:12 PM    Referred By:             Confirmed By:Aquiles Hurley MD     No valid procedures specified.   No results found for this or any previous visit.    No valid procedures specified.    PHYSICAL EXAM  CONSTITUTIONAL: Well built, well nourished in no apparent distress  NECK: no carotid bruit, no JVD  LUNGS: CTA  CHEST WALL: no tenderness  HEART: regular rate and rhythm, S1, S2 normal, no murmur, click, rub or gallop   ABDOMEN: soft, non-tender; bowel sounds normal; no masses,  no organomegaly  EXTREMITIES: Extremities normal, no edema, no calf tenderness noted  NEURO: AAO X 3    I HAVE REVIEWED :    The vital signs, nurses notes, and all the pertinent radiology and labs.        Current Outpatient Medications   Medication Instructions    albuterol (VENTOLIN HFA) 90 mcg/actuation inhaler 1-2 puffs, Inhalation, Every 6 hours PRN, Rescue    ascorbic acid (vitamin C) (VITAMIN C) 1,000 mg, Oral, Daily    buPROPion (WELLBUTRIN SR) 100 mg, Oral, Daily    DULoxetine (CYMBALTA) 60 mg, Oral, Daily, Pt takes 30 mg    esomeprazole (NEXIUM) 40 mg, Oral, Before breakfast    magnesium oxide 400 mg, Oral, Daily    metoprolol succinate (TOPROL-XL) 25 mg, Oral,  Daily    omega-3 fatty acids/fish oil (FISH OIL-OMEGA-3 FATTY ACIDS) 300-1,000 mg capsule 1 capsule, Oral, Daily    rosuvastatin (CRESTOR) 5 mg, Oral, Daily    vitamin D (VITAMIN D3) 1,000 Units, Oral, Daily          Assessment & Plan     Dyslipidemia  Maintain on low-fat low-cholesterol diet   Latest Reference Range & Units 01/16/23 11:05   Cholesterol 120 - 199 mg/dL 170   HDL 40 - 75 mg/dL 66   HDL/Cholesterol Ratio 20.0 - 50.0 % 38.8   LDL Cholesterol External 63.0 - 159.0 mg/dL 81.6   Non-HDL Cholesterol mg/dL 104   Total Cholesterol/HDL Ratio 2.0 - 5.0  2.6   Triglycerides 30 - 150 mg/dL 112     Continue on Crestor at 5 mg daily maintain low-fat low-cholesterol diet    Palpitations  Palpitations controlled on low doses of metoprolol succinate 25 mg once daily along with magnesium supplements 400 mg a day.    Generalized anxiety disorder  Presently stable with Cymbalta 60 mg daily and Wellbutrin 100 mg q.12 hours.          Follow up in about 1 year (around 1/17/2024).

## 2023-01-30 ENCOUNTER — OFFICE VISIT (OUTPATIENT)
Dept: HEMATOLOGY/ONCOLOGY | Facility: CLINIC | Age: 43
End: 2023-01-30
Payer: MEDICARE

## 2023-01-30 VITALS
SYSTOLIC BLOOD PRESSURE: 151 MMHG | WEIGHT: 229.19 LBS | HEART RATE: 90 BPM | BODY MASS INDEX: 39.13 KG/M2 | RESPIRATION RATE: 18 BRPM | TEMPERATURE: 98 F | DIASTOLIC BLOOD PRESSURE: 91 MMHG | HEIGHT: 64 IN

## 2023-01-30 DIAGNOSIS — C50.812 MALIGNANT NEOPLASM OF OVERLAPPING SITES OF LEFT BREAST IN FEMALE, ESTROGEN RECEPTOR NEGATIVE: Primary | ICD-10-CM

## 2023-01-30 DIAGNOSIS — D50.0 IRON DEFICIENCY ANEMIA DUE TO CHRONIC BLOOD LOSS: ICD-10-CM

## 2023-01-30 DIAGNOSIS — Z17.1 MALIGNANT NEOPLASM OF OVERLAPPING SITES OF LEFT BREAST IN FEMALE, ESTROGEN RECEPTOR NEGATIVE: Primary | ICD-10-CM

## 2023-01-30 PROCEDURE — 99214 OFFICE O/P EST MOD 30 MIN: CPT | Mod: S$GLB,,, | Performed by: INTERNAL MEDICINE

## 2023-01-30 PROCEDURE — 99214 PR OFFICE/OUTPT VISIT, EST, LEVL IV, 30-39 MIN: ICD-10-PCS | Mod: S$GLB,,, | Performed by: INTERNAL MEDICINE

## 2023-02-05 NOTE — PROGRESS NOTES
"  [facility] hematology Oncology in office subsequent encounter  note  Sasha Fish, ELROY  1/30/23      C/O:  breast cancer follow up    Triple negative.  Willard adjuvant chemo x's 6.  Surgery, CRISTIAN.  Reconstructed.  Needs further surgery to "clean up" breast.  Hgb 10.  Ferritin decreased, heavy menses. Check stool for heme.  Discussed po Fe pills vs IV Fe infusion weekly x's 2 weeks.  She agrees to Fe infusions.  She has tolerated Fe po poorly in the past.  It is iron infusions were given and energy is improved.  Hemoglobin is normal at this time and ferritin is normal at this time.      She had bilateral mastectomy with flap reconstruction surgery on June 4th, 2020.   Dr. Castellon.  S/P chemo x's 6 cycles  Neoadjuvant Rx.   Echo ej fx NL.    She complains of tenderness at the inferior lateral aspect of her breast and tenderness at the inferior aspect of the right breast.  She had a seroma at L breast under management of plastic surgeon and is followed with intermittent ultrasound of the area.  It approximates 1-1/2 years since her major surgery.    She does have a [employer] nervous Systems shunt placed in 2006.  Will discuss with radiologist to see if MRI of breast is feasible in the setting of the shunt.    EGD, colonoscopy 12/2022 NL.  Repeat in 2 years.  Genetics test pending.  Left Breast Cancer, triple negative.    L breast mamm and u/s 2/26/20.  Mamm/U/S mass effect 2.8 cm to 1 cm, improved L breast.  R breast with fat necrosis.    Considered getting a MRI, but she has CNS shunt in place, S/P surgery for AVM.    She had L mastectomy and Sentinal node bx.  Also R prophylactic mastectomy with  Bilateral reconstruction.  Estimated 10% risk of Ca recurrence on chest wall.    PN sx have increased since completing chemo.  Joint pain sx increased. Cause?  Trial of lyrica 50mg 1 TID.    The ultrasound showed that the breast mass had resolved and was not seen on the ultrasound study.  This is a very good response to " the 6 cycles of neoadjuvant chemotherapy.  Pre op u/s.    She told me CRISTIAN in path report.  Original pathology report from [facility] was triple negative.  [facility] showed no evidence of disease in the left or right breast or in the sentinel node biopsy.    She achieved complete remission status with the neoadjuvant chemotherapy x6 cycles.    On examination today examination today, she does not have palpable lymphadenopathy at the cervical, supraclavicular, or axillary areas at the left or right.    The left and right breast shows extensive postoperative change however the incisions are closed and healing, the areas are nontender without palpable mass, ecchymoses have resolved.  The abdominal area shows that the anterior incision is closed,  well-healed, nontender without ecchymoses.    She had bilateral mastectomy with reconstruction.  I would estimate her risk of developing new primary breast cancer at 3% when followed over the next many years.  The original breast cancer was triple negative.  I have not recommended tamoxifen or Arimidex adjuvantly or preventively for her.     She had further surgery to get symmetrical appearance to the left and right breast .  The incision sites are closed and well healed and the breast are nontender  she does not have palpable mass at the left or right breast.  Her lungs are clear bilaterally, breathing is unlabored  She has regular rhythm without murmur, chest wall is nontender.  Abdomen is nontender without distention and liver and spleen are not palpable  She does not have CVA tenderness  Calves are nontender without edema or petechiae  Neurologically she is grossly intact.  She does not have palpable lymphadenopathy on exam    She had a hemoglobin at 10.  Ferritin 14.  Injectafer weekly x's 2, [facility].  Completed.  Check stools for heme.    Now Hgb stable 13.4,  and Ferritin is acceptable at 39.  Observe  for now.    RTC 6 months.  CBC, CMP, ferritin, CEA 6 months.

## 2023-07-17 ENCOUNTER — TELEPHONE (OUTPATIENT)
Dept: HEMATOLOGY/ONCOLOGY | Facility: CLINIC | Age: 43
End: 2023-07-17

## 2023-07-17 NOTE — TELEPHONE ENCOUNTER
I spoke with pt and reminded her to have labs done prior to appt here on 7/24/23 pt verbalized understanding

## 2023-07-24 ENCOUNTER — OFFICE VISIT (OUTPATIENT)
Dept: HEMATOLOGY/ONCOLOGY | Facility: CLINIC | Age: 43
End: 2023-07-24
Payer: MEDICARE

## 2023-07-24 VITALS
TEMPERATURE: 97 F | SYSTOLIC BLOOD PRESSURE: 118 MMHG | BODY MASS INDEX: 39.44 KG/M2 | WEIGHT: 231 LBS | RESPIRATION RATE: 16 BRPM | DIASTOLIC BLOOD PRESSURE: 80 MMHG | HEIGHT: 64 IN | HEART RATE: 92 BPM

## 2023-07-24 DIAGNOSIS — D50.0 IRON DEFICIENCY ANEMIA DUE TO CHRONIC BLOOD LOSS: ICD-10-CM

## 2023-07-24 DIAGNOSIS — C50.812 MALIGNANT NEOPLASM OF OVERLAPPING SITES OF LEFT BREAST IN FEMALE, ESTROGEN RECEPTOR NEGATIVE: Primary | ICD-10-CM

## 2023-07-24 DIAGNOSIS — Z17.1 MALIGNANT NEOPLASM OF OVERLAPPING SITES OF LEFT BREAST IN FEMALE, ESTROGEN RECEPTOR NEGATIVE: Primary | ICD-10-CM

## 2023-07-24 PROCEDURE — 99214 PR OFFICE/OUTPT VISIT, EST, LEVL IV, 30-39 MIN: ICD-10-PCS | Mod: S$GLB,,, | Performed by: INTERNAL MEDICINE

## 2023-07-24 PROCEDURE — 99214 OFFICE O/P EST MOD 30 MIN: CPT | Mod: S$GLB,,, | Performed by: INTERNAL MEDICINE

## 2023-07-24 RX ORDER — SODIUM CHLORIDE 0.9 % (FLUSH) 0.9 %
10 SYRINGE (ML) INJECTION
Status: CANCELLED | OUTPATIENT
Start: 2023-08-03

## 2023-07-24 RX ORDER — EPINEPHRINE 0.3 MG/.3ML
0.3 INJECTION SUBCUTANEOUS ONCE AS NEEDED
Status: CANCELLED | OUTPATIENT
Start: 2023-08-03

## 2023-07-24 RX ORDER — HEPARIN 100 UNIT/ML
5 SYRINGE INTRAVENOUS
Status: CANCELLED | OUTPATIENT
Start: 2023-08-03

## 2023-07-24 RX ORDER — DIPHENHYDRAMINE HYDROCHLORIDE 50 MG/ML
50 INJECTION INTRAMUSCULAR; INTRAVENOUS ONCE AS NEEDED
Status: CANCELLED | OUTPATIENT
Start: 2023-08-03

## 2023-07-24 RX ORDER — SODIUM CHLORIDE 9 MG/ML
INJECTION, SOLUTION INTRAVENOUS CONTINUOUS
Status: CANCELLED | OUTPATIENT
Start: 2023-08-03

## 2023-07-24 NOTE — LETTER
July 24, 2023        Sasha Fish, LASHAUN  [address]  [place] [address]             [facility] - Hematology Oncology  [address] [address]  Phone: [phone]  Fax: [phone]   Patient: Sergio Clifford   MR Number: [id number]   YOB: 1980   Date of Visit: 7/24/2023       Dear Dr. Fish:    Thank you for referring Sergio Clifford to me for evaluation. Below are the relevant portions of my assessment and plan of care.            If you have questions, please do not hesitate to call me. I look forward to following Sergio along with you.    Sincerely,      СВЕТЛАНА Serna MD           CC    No Recipients

## 2023-07-24 NOTE — PROGRESS NOTES
"  [facility] hematology Oncology in office subsequent encounter  note  Sasha Fish, MD Librado Moreno MD  7/24/23      C/O:  breast cancer follow up    Triple negative. Dx 9/2019.  Approaching 4 years out since Dx in 9/2023.  Willard adjuvant chemo x's 6.  Surgery, CRISTIAN.  Reconstructed.  Needs further surgery to "clean up" breast.  Hgb 10.  Ferritin decreased, heavy menses. Check stool for heme.  Discussed po Fe pills vs IV Fe infusion weekly x's 2 weeks.  She agrees to Fe infusions.  She has tolerated Fe po poorly in the past.  It is iron infusions were given and energy is improved.  Hemoglobin is normal at this time and ferritin is normal at this time.    After Fe infusion, Hgb 13.9 and Ferritin now at 24.  Discussed and ordered Injectafer 750 mg HH x's 1 to replenish Fe stores.  RTC 6 months with lab HH.      She had bilateral mastectomy with flap reconstruction surgery on June 4th, 2020.   Dr. Castellon.  S/P chemo x's 6 cycles  Neoadjuvant Rx.   Echo ej fx NL.    She complains of tenderness at the inferior lateral aspect of her breast and tenderness at the inferior aspect of the right breast.  She had a seroma at L breast under management of plastic surgeon and is followed with intermittent ultrasound of the area.  It approximates 1-1/2 years since her major surgery.    She does have a central nervous Systems shunt placed in 2006.  Will discuss with radiologist to see if MRI of breast is feasible in the setting of the shunt.    EGD, colonoscopy 12/2022 NL.  Repeat in 2 years.  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    L breast mamm and u/s 2/26/20.  Mamm/U/S mass effect 2.8 cm to 1 cm, improved L breast.  R breast with fat necrosis.    Considered getting a MRI, but she has CNS shunt in place, S/P surgery for AVM.    She had L mastectomy and Sentinal node bx.  Also R prophylactic mastectomy with  Bilateral reconstruction.  Estimated " 10% risk of Ca recurrence on chest wall.    PN sx have increased since completing chemo.  Joint pain sx increased. Cause?  Trial of lyrica 50mg 1 TID.    The ultrasound showed that the breast mass had resolved and was not seen on the ultrasound study.  This is a very good response to the 6 cycles of neoadjuvant chemotherapy.  Pre op u/s.    She told me CRISTIAN in path report.  Original pathology report from [facility] was triple negative.  [facility] showed no evidence of disease in the left or right breast or in the sentinel node biopsy.    She achieved complete remission status with the neoadjuvant chemotherapy x6 cycles.    On examination today examination today, she does not have palpable lymphadenopathy at the cervical, supraclavicular, or axillary areas at the left or right.    The left and right breast shows extensive postoperative change however the incisions are closed and healing, the areas are nontender without palpable mass, ecchymoses have resolved.  The abdominal area shows that the anterior incision is closed,  well-healed, nontender without ecchymoses.    She had bilateral mastectomy with reconstruction.  I would estimate her risk of developing new primary breast cancer at 3% when followed over the next many years.  The original breast cancer was triple negative.  I have not recommended tamoxifen or Arimidex adjuvantly or preventively for her.     She had further surgery to get symmetrical appearance to the left and right breast .  The incision sites are closed and well healed and the breast are nontender  she does not have palpable mass at the left or right breast.  Her lungs are clear bilaterally, breathing is unlabored  She has regular rhythm without murmur, chest wall is nontender.  Abdomen is nontender without distention and liver and spleen are not palpable  She does not have CVA tenderness  Calves are nontender without edema or  petechiae  Neurologically she is grossly intact.  She does not have palpable lymphadenopathy on exam    She had a hemoglobin at 10.  Ferritin 14.  Injectafer weekly x's 2, [facility].  Completed.  Check stools for heme.    Now Hgb stable 13.9,  and Ferritin is down to 24.  Give injectafer x's 1 at .    RTC 6 months.  CBC, CMP, ferritin, tumor markers 6 months.                               6

## 2023-07-27 ENCOUNTER — TELEPHONE (OUTPATIENT)
Dept: HEMATOLOGY/ONCOLOGY | Facility: CLINIC | Age: 43
End: 2023-07-27

## 2023-07-27 NOTE — TELEPHONE ENCOUNTER
Tell her she is not anemic now, 1 infusion should be enough.  I ordered 1 infusion HH.  We will recheck lab 6 months.

## 2023-07-27 NOTE — TELEPHONE ENCOUNTER
Attempted to make patient aware of above, no answer at number listed. Voicemail left with above and instructions to call back with any questions.

## 2023-08-10 ENCOUNTER — TELEPHONE (OUTPATIENT)
Dept: HEMATOLOGY/ONCOLOGY | Facility: CLINIC | Age: 43
End: 2023-08-10

## 2023-08-10 DIAGNOSIS — Z17.1 MALIGNANT NEOPLASM OF OVERLAPPING SITES OF LEFT BREAST IN FEMALE, ESTROGEN RECEPTOR NEGATIVE: Primary | ICD-10-CM

## 2023-08-10 DIAGNOSIS — C50.812 MALIGNANT NEOPLASM OF OVERLAPPING SITES OF LEFT BREAST IN FEMALE, ESTROGEN RECEPTOR NEGATIVE: Primary | ICD-10-CM

## 2023-08-10 NOTE — TELEPHONE ENCOUNTER
Patient called in reporting pain in her left chest area, beneath where she had her breast reconstructed after her mastectomy. She reports it feels sharp and burning and has been occurring x 1 week. Pain does get worse with palpation. Patient denies left arm pain, feelings of indigestion, or SOB. Reviewed with MATT Leiva and per her verbal order, patient to get an MRI. Jamal reports that she has a shunt in her head and cannot get an MRI. Reviewed with MATT Leiva and per her verbal order, patient to get a mammogram and ultrasound. Patient educated that if the chest pain gets worse, if she becomes SOB, experiences, arm pain or feeling like she has indigestion, patient to report immediately to the ER for evaluation and treatment. Patient made aware of above and verbalized understanding.

## 2023-08-24 ENCOUNTER — HOSPITAL ENCOUNTER (OUTPATIENT)
Dept: RADIOLOGY | Facility: HOSPITAL | Age: 43
Discharge: HOME | End: 2023-08-24
Attending: INTERNAL MEDICINE
Payer: MEDICARE

## 2023-08-24 DIAGNOSIS — C50.812 MALIGNANT NEOPLASM OF OVERLAPPING SITES OF LEFT BREAST IN FEMALE, ESTROGEN RECEPTOR NEGATIVE: ICD-10-CM

## 2023-08-24 DIAGNOSIS — Z17.1 MALIGNANT NEOPLASM OF OVERLAPPING SITES OF LEFT BREAST IN FEMALE, ESTROGEN RECEPTOR NEGATIVE: ICD-10-CM

## 2023-08-24 PROCEDURE — 76642 ULTRASOUND BREAST LIMITED: CPT | Mod: 26,LT,, | Performed by: RADIOLOGY

## 2023-08-24 PROCEDURE — 76642 ULTRASOUND BREAST LIMITED: CPT | Mod: TC,LT

## 2023-08-24 PROCEDURE — 76642 US BREAST LEFT LIMITED: ICD-10-PCS | Mod: 26,LT,, | Performed by: RADIOLOGY

## 2023-08-25 DIAGNOSIS — R93.89 ABNORMAL ULTRASOUND: Primary | ICD-10-CM

## 2023-09-11 NOTE — TELEPHONE ENCOUNTER
----- Message from Augusto Magallon sent at 6/1/2022  4:18 PM CDT -----  Contact: pt  Type: Needs Medical Advice    Who Called:pt    Best Call Back Number:[phone]    Additional Information: Jose is too expensive for Rx pickup, asking for alternative if office has samples or if there is an otc a pt can pickup as well please call back pt. rosuvastatin (CRESTOR) 5 MG tablet     Please Advise-Thank you        Finasteride Pregnancy And Lactation Text: This medication is absolutely contraindicated during pregnancy. It is unknown if it is excreted in breast milk.

## 2023-09-13 ENCOUNTER — TELEPHONE (OUTPATIENT)
Dept: HEMATOLOGY/ONCOLOGY | Facility: CLINIC | Age: 43
End: 2023-09-13

## 2023-09-13 NOTE — TELEPHONE ENCOUNTER
LASHAUN Benitez, called and reported that patient had a lump to the 6 o clock area of her left breast and was experiencing discomfort. She wanted to know if Dr Serna would recommend any other scans as patient has already had an Ultrasound completed. Reviewed with Dr Serna, he reports he will review chart and give further instructions.

## 2023-09-26 ENCOUNTER — PATIENT MESSAGE (OUTPATIENT)
Dept: HEMATOLOGY/ONCOLOGY | Facility: CLINIC | Age: 43
End: 2023-09-26

## 2023-09-27 ENCOUNTER — TELEPHONE (OUTPATIENT)
Dept: HEMATOLOGY/ONCOLOGY | Facility: CLINIC | Age: 43
End: 2023-09-27

## 2023-09-27 DIAGNOSIS — N63.25 BREAST LUMP ON LEFT SIDE AT 12 O'CLOCK POSITION: Primary | ICD-10-CM

## 2023-09-28 NOTE — TELEPHONE ENCOUNTER
Order placed in epic for [facility]   Radiology to do u/s guided of painful palpable mass at L breast.    See if you can get this scheduled.

## 2023-12-30 ENCOUNTER — OFFICE VISIT (OUTPATIENT)
Dept: URGENT CARE | Facility: CLINIC | Age: 43
End: 2023-12-30
Payer: MEDICARE

## 2023-12-30 VITALS
SYSTOLIC BLOOD PRESSURE: 131 MMHG | RESPIRATION RATE: 18 BRPM | TEMPERATURE: 99 F | DIASTOLIC BLOOD PRESSURE: 81 MMHG | OXYGEN SATURATION: 100 % | WEIGHT: 225 LBS | HEART RATE: 77 BPM | BODY MASS INDEX: 38.41 KG/M2 | HEIGHT: 64 IN

## 2023-12-30 DIAGNOSIS — J06.9 VIRAL URI WITH COUGH: Primary | ICD-10-CM

## 2023-12-30 DIAGNOSIS — Z20.822 COVID-19 VIRUS NOT DETECTED: ICD-10-CM

## 2023-12-30 DIAGNOSIS — R50.9 FEVER, UNSPECIFIED FEVER CAUSE: ICD-10-CM

## 2023-12-30 LAB
CTP QC/QA: YES
FLUAV AG NPH QL: NEGATIVE
FLUBV AG NPH QL: NEGATIVE
S PYO RRNA THROAT QL PROBE: NEGATIVE
SARS-COV-2 AG RESP QL IA.RAPID: NEGATIVE

## 2023-12-30 PROCEDURE — 87880 POCT RAPID STREP A: ICD-10-PCS | Mod: QW,,, | Performed by: NURSE PRACTITIONER

## 2023-12-30 PROCEDURE — 87811 SARS-COV-2 COVID19 W/OPTIC: CPT | Mod: QW,S$GLB,, | Performed by: NURSE PRACTITIONER

## 2023-12-30 PROCEDURE — 99214 PR OFFICE/OUTPT VISIT, EST, LEVL IV, 30-39 MIN: ICD-10-PCS | Mod: S$GLB,,, | Performed by: NURSE PRACTITIONER

## 2023-12-30 PROCEDURE — 87804 INFLUENZA ASSAY W/OPTIC: CPT | Mod: QW,,, | Performed by: NURSE PRACTITIONER

## 2023-12-30 PROCEDURE — 87880 STREP A ASSAY W/OPTIC: CPT | Mod: QW,,, | Performed by: NURSE PRACTITIONER

## 2023-12-30 PROCEDURE — 87804 POCT INFLUENZA A/B: ICD-10-PCS | Mod: QW,,, | Performed by: NURSE PRACTITIONER

## 2023-12-30 PROCEDURE — 87811 SARS CORONAVIRUS 2 ANTIGEN POCT, MANUAL READ: ICD-10-PCS | Mod: QW,S$GLB,, | Performed by: NURSE PRACTITIONER

## 2023-12-30 PROCEDURE — 99214 OFFICE O/P EST MOD 30 MIN: CPT | Mod: S$GLB,,, | Performed by: NURSE PRACTITIONER

## 2023-12-30 RX ORDER — ALBUTEROL SULFATE 90 UG/1
2 AEROSOL, METERED RESPIRATORY (INHALATION) EVERY 6 HOURS PRN
Qty: 18 G | Refills: 0 | Status: SHIPPED | OUTPATIENT
Start: 2023-12-30 | End: 2024-03-14

## 2023-12-30 RX ORDER — BROMPHENIRAMINE MALEATE, PSEUDOEPHEDRINE HYDROCHLORIDE, AND DEXTROMETHORPHAN HYDROBROMIDE 2; 30; 10 MG/5ML; MG/5ML; MG/5ML
10 SYRUP ORAL EVERY 4 HOURS PRN
Qty: 118 ML | Refills: 0 | Status: SHIPPED | OUTPATIENT
Start: 2023-12-30 | End: 2024-01-09

## 2023-12-30 RX ORDER — AZELASTINE 1 MG/ML
1 SPRAY, METERED NASAL 2 TIMES DAILY PRN
Qty: 30 ML | Refills: 0 | Status: SHIPPED | OUTPATIENT
Start: 2023-12-30 | End: 2024-03-14

## 2023-12-30 RX ORDER — GUAIFENESIN 200 MG/1
400 TABLET ORAL EVERY 4 HOURS PRN
Qty: 30 TABLET | Refills: 0 | Status: SHIPPED | OUTPATIENT
Start: 2023-12-30 | End: 2024-01-06

## 2023-12-30 RX ORDER — PROMETHAZINE HYDROCHLORIDE AND DEXTROMETHORPHAN HYDROBROMIDE 6.25; 15 MG/5ML; MG/5ML
5 SYRUP ORAL NIGHTLY PRN
Qty: 50 ML | Refills: 0 | Status: SHIPPED | OUTPATIENT
Start: 2023-12-30 | End: 2024-01-06

## 2023-12-30 NOTE — PROGRESS NOTES
"Subjective:      Patient ID: Sergio Clifford is a 43 y.o. female.    Vitals:  height is 5' 4" (1.626 m) and weight is 102.1 kg (225 lb). Her oral temperature is 98.7 °F (37.1 °C). Her blood pressure is 131/81 and her pulse is 77. Her respiration is 18 and oxygen saturation is 100%.     Chief Complaint: Cough    Highest fever recorded 100.3 F.    Cough  This is a new problem. The current episode started yesterday. The problem has been unchanged. The problem occurs constantly. The cough is Non-productive. Associated symptoms include a fever, headaches, myalgias and a sore throat. Pertinent negatives include no chest pain, ear pain, rash or shortness of breath. The symptoms are aggravated by lying down. Treatments tried: dayquil/ advil. The treatment provided no relief.       Constitution: Positive for fever.   HENT:  Positive for congestion and sore throat. Negative for ear pain.    Neck: Negative for painful lymph nodes.   Cardiovascular:  Negative for chest pain, palpitations and sob on exertion.   Respiratory:  Positive for cough and sputum production. Negative for shortness of breath.    Gastrointestinal:  Negative for nausea and vomiting.   Musculoskeletal:  Positive for muscle ache.   Skin:  Negative for rash.   Neurological:  Positive for headaches. Negative for dizziness, history of vertigo, light-headedness, disorientation and altered mental status.   Hematologic/Lymphatic: Negative for swollen lymph nodes.   Psychiatric/Behavioral:  Negative for altered mental status, disorientation and confusion.       Objective:     Physical Exam   Constitutional: She is oriented to person, place, and time. She appears well-developed. She is cooperative.  Non-toxic appearance. She does not appear ill. No distress.   HENT:   Head: Normocephalic and atraumatic.   Ears:   Right Ear: Hearing and external ear normal.   Left Ear: Hearing and external ear normal.   Nose: Mucosal edema, rhinorrhea and congestion present. No " nasal deformity. No epistaxis. Right sinus exhibits no maxillary sinus tenderness and no frontal sinus tenderness. Left sinus exhibits no maxillary sinus tenderness and no frontal sinus tenderness.   Mouth/Throat: Uvula is midline, oropharynx is clear and moist and mucous membranes are normal. Mucous membranes are moist. No trismus in the jaw. Normal dentition. No uvula swelling. No oropharyngeal exudate, posterior oropharyngeal edema, posterior oropharyngeal erythema, tonsillar abscesses or cobblestoning. Tonsils are 1+ on the right. Tonsils are 1+ on the left. No tonsillar exudate. Oropharynx is clear.   Eyes: Conjunctivae and lids are normal. No scleral icterus.   Neck: Trachea normal and phonation normal. Neck supple. No edema present. No erythema present. No neck rigidity present.   Cardiovascular: Normal rate, regular rhythm, normal heart sounds and normal pulses.   Pulmonary/Chest: Effort normal and breath sounds normal. No stridor. No respiratory distress. She has no decreased breath sounds. She has no wheezes. She has no rhonchi.   Abdominal: Normal appearance.   Musculoskeletal: Normal range of motion.         General: No deformity. Normal range of motion.   Lymphadenopathy:     She has no cervical adenopathy.   Neurological: no focal deficit. She is alert and oriented to person, place, and time. She exhibits normal muscle tone. Coordination normal.   Skin: Skin is warm, dry, intact, not diaphoretic and not pale. Capillary refill takes 2 to 3 seconds.   Psychiatric: Her speech is normal and behavior is normal. Judgment and thought content normal.   Nursing note and vitals reviewed.      Assessment:     1. Viral URI with cough    2. Fever, unspecified fever cause    3. COVID-19 virus not detected        Plan:       Viral URI with cough  -     azelastine (ASTELIN) 137 mcg (0.1 %) nasal spray; 1 spray (137 mcg total) by Nasal route 2 (two) times daily as needed for Rhinitis.  Dispense: 30 mL; Refill: 0  -      guaiFENesin 200 mg tablet; Take 2 tablets (400 mg total) by mouth every 4 (four) hours as needed for Congestion.  Dispense: 30 tablet; Refill: 0  -     brompheniramine-pseudoeph-DM (BROMFED DM) 2-30-10 mg/5 mL Syrp; Take 10 mLs by mouth every 4 (four) hours as needed (sinus congestion/drainage).  Dispense: 118 mL; Refill: 0  -     benzocaine-menthoL 6-10 mg lozenge; Take 1 lozenge by mouth every 2 (two) hours as needed (Sore Throat).  Dispense: 18 tablet; Refill: 0  -     promethazine-dextromethorphan (PROMETHAZINE-DM) 6.25-15 mg/5 mL Syrp; Take 5 mLs by mouth nightly as needed (cough). Take as needed for nighttime coughing  Dispense: 50 mL; Refill: 0  -     albuterol (PROVENTIL HFA) 90 mcg/actuation inhaler; Inhale 2 puffs into the lungs every 6 (six) hours as needed for Wheezing. Rescue  Dispense: 18 g; Refill: 0    Fever, unspecified fever cause  -     POCT Influenza A/B Rapid Antigen  -     SARS Coronavirus 2 Antigen, POCT Manual Read  -     POCT rapid strep A    COVID-19 virus not detected       Flu negative   Strep negative    I have discussed the test results and physical exam findings with the patient. We discussed symptom monitoring, conservative care methods, medication use, and follow up orders. The patient verbalized understanding and agreement with the plan of care.

## 2023-12-30 NOTE — PROGRESS NOTES
Subjective:      Patient ID: Sergio Clifford is a 43 y.o. female.    Vitals:  vitals were not taken for this visit.     Chief Complaint: No chief complaint on file.    HPI  ROS   Objective:     Physical Exam    Assessment:     No diagnosis found.    Plan:       There are no diagnoses linked to this encounter.

## 2024-01-09 ENCOUNTER — TELEPHONE (OUTPATIENT)
Dept: HEMATOLOGY/ONCOLOGY | Facility: CLINIC | Age: 44
End: 2024-01-09

## 2024-01-16 ENCOUNTER — TELEPHONE (OUTPATIENT)
Dept: HEMATOLOGY/ONCOLOGY | Facility: CLINIC | Age: 44
End: 2024-01-16

## 2024-01-17 ENCOUNTER — OFFICE VISIT (OUTPATIENT)
Dept: HEMATOLOGY/ONCOLOGY | Facility: CLINIC | Age: 44
End: 2024-01-17
Payer: MEDICARE

## 2024-01-17 VITALS
TEMPERATURE: 97 F | WEIGHT: 231.31 LBS | RESPIRATION RATE: 16 BRPM | SYSTOLIC BLOOD PRESSURE: 114 MMHG | HEIGHT: 64 IN | DIASTOLIC BLOOD PRESSURE: 75 MMHG | HEART RATE: 94 BPM | BODY MASS INDEX: 39.49 KG/M2

## 2024-01-17 DIAGNOSIS — D50.0 IRON DEFICIENCY ANEMIA DUE TO CHRONIC BLOOD LOSS: ICD-10-CM

## 2024-01-17 DIAGNOSIS — Z17.1 MALIGNANT NEOPLASM OF OVERLAPPING SITES OF LEFT BREAST IN FEMALE, ESTROGEN RECEPTOR NEGATIVE: Primary | ICD-10-CM

## 2024-01-17 DIAGNOSIS — C50.812 MALIGNANT NEOPLASM OF OVERLAPPING SITES OF LEFT BREAST IN FEMALE, ESTROGEN RECEPTOR NEGATIVE: Primary | ICD-10-CM

## 2024-01-17 PROCEDURE — 99214 OFFICE O/P EST MOD 30 MIN: CPT | Mod: S$GLB,,, | Performed by: INTERNAL MEDICINE

## 2024-01-17 NOTE — PROGRESS NOTES
"  [facility] hematology Oncology in office subsequent encounter  note  ELROY Holland MD Mark Nicaud, MD  1/17/24      C/O:  breast cancer follow up    Triple negative. Dx 9/2019.  Approaching 4 1/2 years out since Dx in 9/2023.  Willard adjuvant chemo x's 6.  Surgery, CRISTIAN.  Reconstructed.  Needs further surgery to "clean up" breast.  Hgb 10.  Ferritin decreased, heavy menses. Check stool for heme.  Discussed po Fe pills vs IV Fe infusion weekly x's 2 weeks.  She agrees to Fe infusions.  She has tolerated Fe po poorly in the past.  It is iron infusions were given and energy is improved.  Hemoglobin is normal at this time and ferritin is normal at this time.    After Fe infusion, Hgb 13.6 and Ferritin now at 37.  Discussed and ordered Injectafer 750 mg HH x's 1 to replenish Fe stores.  RTC 6 months with lab HH.      She had bilateral mastectomy with flap reconstruction surgery on June 4th, 2020.   Dr. Castellon.  S/P chemo x's 6 cycles  Neoadjuvant Rx.   Echo ej fx NL.    She complains of tenderness at the inferior lateral aspect of her breast and tenderness at the inferior aspect of the right breast.  She had a seroma at L breast under management of plastic surgeon and is followed with intermittent ultrasound of the area.  It approximates 1-1/2 years since her major surgery.    She does have a [employer] nervous Systems shunt placed in 2006.  Will discuss with radiologist to see if MRI of breast is feasible in the setting of the shunt.    EGD, colonoscopy 12/2022 NL.  Repeat in 2 years.  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    L breast mamm and u/s 2/26/20.  Mamm/U/S mass effect 2.8 cm to 1 cm, improved L breast.  R breast with fat necrosis.    Considered getting a MRI, but she has CNS shunt in place, S/P surgery for AVM.    She had L mastectomy and Sentinal node bx.  Also R prophylactic mastectomy with  Bilateral " reconstruction.  Estimated 10% risk of Ca recurrence on chest wall.    PN sx have increased since completing chemo.  Joint pain sx increased. Cause?  Trial of lyrica 50mg 1 TID.    The ultrasound showed that the breast mass had resolved and was not seen on the ultrasound study.  This is a very good response to the 6 cycles of neoadjuvant chemotherapy.  Pre op u/s.    She told me CRISTIAN in path report.  Original pathology report from [facility] was triple negative.  [facility] showed no evidence of disease in the left or right breast or in the sentinel node biopsy.    She achieved complete remission status with the neoadjuvant chemotherapy x6 cycles.    On examination today examination today, she does not have palpable lymphadenopathy at the cervical, supraclavicular, or axillary areas at the left or right.    The left and right breast shows extensive postoperative change however the incisions are closed and healing, the areas are nontender without palpable mass, ecchymoses have resolved.  The abdominal area shows that the anterior incision is closed,  well-healed, nontender without ecchymoses.    She had bilateral mastectomy with reconstruction.  I would estimate her risk of developing new primary breast cancer at 3% when followed over the next many years.  The original breast cancer was triple negative.  I have not recommended tamoxifen or Arimidex adjuvantly or preventively for her.     She had further surgery to get symmetrical appearance to the left and right breast .  The incision sites are closed and well healed and the breast are nontender  she does not have palpable mass at the left or right breast.    She has keloid formation with large scaring at L & R back areas.  Her lungs are clear bilaterally, breathing is unlabored  She has regular rhythm without murmur, chest wall is nontender.  Abdomen is nontender without distention and liver and spleen are  not palpable  She does not have CVA tenderness  Calves are nontender without edema or petechiae  Neurologically she is grossly intact.  She does not have palpable lymphadenopathy on exam    She had a hemoglobin at 10.  Ferritin 14.  Injectafer weekly x's 2, [facility].  Completed.  Check stools for heme.    Now Hgb stable 13.6,  and Ferritin is down to 37.  Give injectafer x's 1 at .    RTC 6 months.  CBC, CMP, ferritin, tumor markers 6 months.

## 2024-01-20 ENCOUNTER — TELEPHONE (OUTPATIENT)
Dept: HEMATOLOGY/ONCOLOGY | Facility: CLINIC | Age: 44
End: 2024-01-20

## 2024-01-20 RX ORDER — HEPARIN 100 UNIT/ML
5 SYRINGE INTRAVENOUS
OUTPATIENT
Start: 2024-01-31

## 2024-01-20 RX ORDER — SODIUM CHLORIDE 0.9 % (FLUSH) 0.9 %
10 SYRINGE (ML) INJECTION
OUTPATIENT
Start: 2024-01-31

## 2024-01-20 RX ORDER — SODIUM CHLORIDE 9 MG/ML
INJECTION, SOLUTION INTRAVENOUS CONTINUOUS
OUTPATIENT
Start: 2024-01-31

## 2024-01-20 RX ORDER — DIPHENHYDRAMINE HYDROCHLORIDE 50 MG/ML
50 INJECTION INTRAMUSCULAR; INTRAVENOUS ONCE AS NEEDED
OUTPATIENT
Start: 2024-01-31

## 2024-01-20 RX ORDER — EPINEPHRINE 0.3 MG/.3ML
0.3 INJECTION SUBCUTANEOUS ONCE AS NEEDED
OUTPATIENT
Start: 2024-01-31

## 2024-01-22 ENCOUNTER — TELEPHONE (OUTPATIENT)
Dept: HEMATOLOGY/ONCOLOGY | Facility: CLINIC | Age: 44
End: 2024-01-22

## 2024-01-31 RX ORDER — METOPROLOL SUCCINATE 25 MG/1
25 TABLET, EXTENDED RELEASE ORAL
Qty: 90 TABLET | Refills: 3 | Status: SHIPPED | OUTPATIENT
Start: 2024-01-31

## 2024-02-09 ENCOUNTER — TELEPHONE (OUTPATIENT)
Dept: HEMATOLOGY/ONCOLOGY | Facility: CLINIC | Age: 44
End: 2024-02-09

## 2024-03-14 ENCOUNTER — OFFICE VISIT (OUTPATIENT)
Dept: CARDIOLOGY | Facility: CLINIC | Age: 44
End: 2024-03-14
Payer: MEDICARE

## 2024-03-14 VITALS
BODY MASS INDEX: 38.41 KG/M2 | HEART RATE: 100 BPM | DIASTOLIC BLOOD PRESSURE: 80 MMHG | SYSTOLIC BLOOD PRESSURE: 116 MMHG | HEIGHT: 64 IN | WEIGHT: 225 LBS | RESPIRATION RATE: 16 BRPM | OXYGEN SATURATION: 97 %

## 2024-03-14 DIAGNOSIS — R00.2 PALPITATIONS: Primary | ICD-10-CM

## 2024-03-14 DIAGNOSIS — D50.0 IRON DEFICIENCY ANEMIA DUE TO CHRONIC BLOOD LOSS: ICD-10-CM

## 2024-03-14 DIAGNOSIS — F41.1 GENERALIZED ANXIETY DISORDER: ICD-10-CM

## 2024-03-14 DIAGNOSIS — E78.5 DYSLIPIDEMIA: ICD-10-CM

## 2024-03-14 PROCEDURE — 99999 PR PBB SHADOW E&M-EST. PATIENT-LVL III: CPT | Mod: PBBFAC,,, | Performed by: INTERNAL MEDICINE

## 2024-03-14 PROCEDURE — 99213 OFFICE O/P EST LOW 20 MIN: CPT | Mod: PBBFAC,PN | Performed by: INTERNAL MEDICINE

## 2024-03-14 PROCEDURE — 99213 OFFICE O/P EST LOW 20 MIN: CPT | Mod: S$PBB,,, | Performed by: INTERNAL MEDICINE

## 2024-03-14 RX ORDER — PANTOPRAZOLE SODIUM 20 MG/1
40 TABLET, DELAYED RELEASE ORAL DAILY
Qty: 180 TABLET | Refills: 3 | Status: SHIPPED | OUTPATIENT
Start: 2024-03-14 | End: 2025-03-14

## 2024-03-14 RX ORDER — ROSUVASTATIN CALCIUM 5 MG/1
5 TABLET, COATED ORAL DAILY
Qty: 90 EACH | Refills: 3 | Status: SHIPPED | OUTPATIENT
Start: 2024-03-14 | End: 2025-03-14

## 2024-03-14 RX ORDER — METOPROLOL SUCCINATE 25 MG/1
25 TABLET, EXTENDED RELEASE ORAL DAILY
Qty: 90 TABLET | Refills: 3 | Status: CANCELLED | OUTPATIENT
Start: 2024-03-14

## 2024-03-14 RX ORDER — DILTIAZEM HYDROCHLORIDE 120 MG/1
120 CAPSULE, EXTENDED RELEASE ORAL DAILY
Qty: 90 CAPSULE | Refills: 3 | Status: SHIPPED | OUTPATIENT
Start: 2024-03-14 | End: 2025-03-14

## 2024-03-14 NOTE — ASSESSMENT & PLAN NOTE
Dyslipidemia currently on Crestor 5 mg a day maintaining on low-salt low-fat diet encouraged her to repeat some blood work in the near future fasting   No lifting > 10 lbs for 2 weeks    No driving if taking narcotic medications    OK to shower 24 hours after surgery. Remove band aids before shower, pat steri-strips dry.    Do not soak in tub or pool for 4 weeks    Consider starting a stool softener if you have a history of constipation or if you are taking narcotics regularly    Call with any questions or concerns    Follow up with Dr. Harper in 2 weeks

## 2024-03-14 NOTE — ASSESSMENT & PLAN NOTE
Patient has been doing fairly well on the current regimen maintain on low doses of beta-blocker with metoprolol succinate 25 mg once daily encouraged her to consider changing this to Cardizem CD at 120 mg daily because of Raynaud's phenomenon.  We can gradually increase this as necessary this may cause a transient increase in heart rate.

## 2024-03-14 NOTE — ASSESSMENT & PLAN NOTE
Clinically stable on the current therapy and this includes Cymbalta 60 mg daily and Wellbutrin daily.

## 2024-03-14 NOTE — PROGRESS NOTES
Subjective:    Patient ID:  Sergio Clifford is a 43 y.o. female patient here for evaluation Follow-up (She has noticed when she cooks her legs look purple, some tingling and numbness)      History of Present Illness:     Is here for follow-up evaluation for discoloration of her legs.  Apparently upon longstanding while cooking she has noted some purplish changes discoloration in his both feet.  She would this has also some associated with some tingling and numbness as well.    Denies having any palpitations no chest pain no dizziness lightheadedness or weakness.  Patient has been taking metoprolol for resting tachycardia I suspect there is some amount of Raynaud's phenomenon causing her symptoms.  Her gait is a little bit more stable she is still using a Rollator.  She does some therapy at home.          Review of patient's allergies indicates:   Allergen Reactions    Adhesive tape-silicones Rash    Codeine Itching    Gabapentin Other (See Comments)     Vivid dreams    Meperidine Other (See Comments)     hallucinations  Other reaction(s): Other (See Comments)  Hallucinations    Adhesive Rash    Penicillins Hives and Rash    Ultram [tramadol] Nausea Only       Past Medical History:   Diagnosis Date    Anxiety 2006    AVM (arteriovenous malformation) 2006    Balance disorder 2006    from AVM  uses walker    Breast cancer 08/2019    left    Edema     Fractures 2012    left foot    MVP (mitral valve prolapse) 1997    Reflux     Seroma of breast     recurrent seroma to left breast.     Wears glasses      Past Surgical History:   Procedure Laterality Date    BILATERAL MASTECTOMY  06/04/2020    BRAIN AVM REPAIR  2006    BREAST BIOPSY  left    2002    BREAST RECONSTRUCTION  06/04/2020    BREAST RECONSTRUCTION  06/04/2020    COLONOSCOPY N/A 01/13/2020    Procedure: COLONOSCOPY;  Surgeon: Librado Monroe MD;  Location: [facility];  Service: Endoscopy;  Laterality: N/A;    COLONOSCOPY N/A 12/29/2022    Procedure:  COLONOSCOPY;  Surgeon: Librado Monroe MD;  Location: [facility] ENDO;  Service: Endoscopy;  Laterality: N/A;    CRANIOTOMY  2006    CVA tumor removal  2006    ESOPHAGOGASTRODUODENOSCOPY N/A 2020    Procedure: EGD (ESOPHAGOGASTRODUODENOSCOPY);  Surgeon: Librado Monroe MD;  Location: [facility] ENDO;  Service: Endoscopy;  Laterality: N/A;    FOOT FRACTURE SURGERY Left     INSERTION OF CATHETER Right 2019    Procedure: INSERTION, CATHETER;  Surgeon: Caitlyn Elkins MD;  Location: [facility] OR;  Service: General;  Laterality: Right;    removal port of cath      REVISION OF VASCULAR ACCESS PORT Right 2019    Procedure: REVISION, VASCULAR ACCESS PORT;  Surgeon: Caitlyn Elkins MD;  Location: [facility] OR;  Service: General;  Laterality: Right;  WOUND OPENED AND PORT FLIPPED.    VENTRICULOPERITONEAL SHUNT      MAGNETIC - NO MRI     Social History     Tobacco Use    Smoking status: Former     Current packs/day: 0.00     Average packs/day: 0.3 packs/day for 4.0 years (1.0 ttl pk-yrs)     Types: Cigarettes     Start date: 2008     Quit date: 2012     Years since quittin.8    Smokeless tobacco: Never   Substance Use Topics    Alcohol use: Yes     Alcohol/week: 27.0 standard drinks of alcohol     Types: 21 Glasses of wine, 4 Cans of beer, 2 Shots of liquor per week    Drug use: No        Review of Systems:    As noted in HPI in addition      REVIEW OF SYSTEMS  CARDIOVASCULAR: No recent chest pain, palpitations, arm, neck, or jaw pain  Mainly noticed some discoloration in her feet upon longstanding.  RESPIRATORY: No recent fever, cough chills, SOB or congestion  : No blood in the urine  GI: No Nausea, vomiting, constipation, diarrhea, blood, or reflux.  MUSCULOSKELETAL: No myalgias  NEURO: No lightheadedness or dizziness some tingling and numbness in her feet  EYES: No Double vision, blurry, vision or headache              Objective        Vitals:    24 0850   BP: 116/80   Pulse: 100   Resp: 16  "      LIPIDS - LAST 2   Lab Results   Component Value Date    CHOL 170 01/16/2023    HDL 66 01/16/2023    LDLCALC 81.6 01/16/2023    TRIG 112 01/16/2023    CHOLHDL 38.8 01/16/2023       CBC - LAST 2  Lab Results   Component Value Date    WBC 7.95 01/16/2023    WBC 11.13 12/27/2022    RBC 4.26 01/16/2023    RBC 4.43 12/27/2022    HGB 13.7 01/16/2023    HGB 14.4 12/27/2022    HCT 42.0 01/16/2023    HCT 43.4 12/27/2022    MCV 99 (H) 01/16/2023    MCV 98 12/27/2022    MCH 32.2 (H) 01/16/2023    MCH 32.5 (H) 12/27/2022    MCHC 32.6 01/16/2023    MCHC 33.2 12/27/2022    RDW 12.6 01/16/2023    RDW 12.7 12/27/2022     01/16/2023     12/27/2022    MPV 8.3 (L) 01/16/2023    MPV 8.3 (L) 12/27/2022    GRAN 5.3 01/16/2023    GRAN 66.9 01/16/2023    LYMPH 1.8 01/16/2023    LYMPH 22.5 01/16/2023    MONO 0.7 01/16/2023    MONO 8.7 01/16/2023    BASO 0.04 01/16/2023    BASO 0.04 12/27/2022    NRBC 0 01/16/2023    NRBC 0 12/27/2022       CHEMISTRY & LIVER FUNCTION - LAST 2  Lab Results   Component Value Date     01/16/2023     02/10/2020    K 4.8 01/16/2023    K 4.2 02/10/2020    CL 99 01/16/2023     02/10/2020    CO2 29 01/16/2023    CO2 27 02/10/2020    ANIONGAP 10 01/16/2023    ANIONGAP 12 02/10/2020    BUN 11 01/16/2023    BUN 13 02/10/2020    CREATININE 0.7 01/16/2023    CREATININE 0.6 02/10/2020     01/16/2023     (H) 02/10/2020    CALCIUM 9.5 01/16/2023    CALCIUM 9.3 02/10/2020    ALBUMIN 4.0 01/16/2023    ALBUMIN 3.8 02/10/2020    PROT 7.4 01/16/2023    PROT 6.8 02/10/2020    ALKPHOS 56 01/16/2023    ALKPHOS 57 02/10/2020    ALT 16 01/16/2023    ALT 22 02/10/2020    AST 14 01/16/2023    AST 16 02/10/2020    BILITOT 0.7 01/16/2023    BILITOT 0.9 02/10/2020        CARDIAC PROFILE - LAST 2  No results found for: "BNP", "CPK", "CPKMB", "LDH", "TROPONINI", "TROPONINIHS"     COAGULATION - LAST 2  Lab Results   Component Value Date    LABPT 12.5 11/05/2021    INR 0.9 12/27/2022    INR " "1.0 11/05/2021    APTT 24.2 12/27/2022    APTT 25.5 11/05/2021       ENDOCRINE & PSA - LAST 2  No results found for: "HGBA1C", "MICROALBUR", "TSH", "PROCAL", "PSA"     ECHOCARDIOGRAM RESULTS  Results for orders placed in visit on 12/30/19    Echo Color Flow Doppler? Yes; Bubble Contrast? No    Interpretation Summary  · The left ventricular global longitudinal strain is --18%.< normal>  · Increased (hyperdynamic) left ventricular systolic function. The estimated ejection fraction is 75%  · Normal LV diastolic function.  · No wall motion abnormalities.  · Normal right ventricular systolic function.  · Normal central venous pressure (3 mm Hg).      CURRENT/PREVIOUS VISIT EKG  Results for orders placed or performed during the hospital encounter of 12/27/22   EKG 12-lead    Collection Time: 12/27/22  2:15 PM    Narrative    Test Reason : Z01.818,    Vent. Rate : 082 BPM     Atrial Rate : 082 BPM     P-R Int : 158 ms          QRS Dur : 074 ms      QT Int : 362 ms       P-R-T Axes : 047 048 037 degrees     QTc Int : 422 ms    Normal sinus rhythm  Normal ECG  When compared with ECG of 31-MAY-2022 17:23,  No significant change was found  Confirmed by Xavi TAN, Aquiles HIRSCH ([id number]) on 12/28/2022 2:56:12 PM    Referred By:             Confirmed By:Aquiles Hurley MD     No valid procedures specified.   No results found for this or any previous visit.    No valid procedures specified.    PHYSICAL EXAM  CONSTITUTIONAL: Well built, well nourished in no apparent distress  NECK: no carotid bruit, no JVD  LUNGS: CTA  CHEST WALL: no tenderness  HEART: regular rate and rhythm, S1, S2 normal, no murmur, click, rub or gallop   ABDOMEN: soft, non-tender; bowel sounds normal; no masses,  no organomegaly  EXTREMITIES: Extremities normal, no edema, no calf tenderness noted  NEURO: AAO X 3    I HAVE REVIEWED :    The vital signs, nurses notes, and all the pertinent radiology and labs.        Current Outpatient Medications   Medication " Instructions    ascorbic acid (vitamin C) (VITAMIN C) 1,000 mg, Oral, Daily    buPROPion (WELLBUTRIN SR) 100 mg, Oral, Daily    DULoxetine (CYMBALTA) 60 mg, Oral, Daily, Pt takes 30 mg    esomeprazole (NEXIUM) 40 mg, Oral, Before breakfast    magnesium oxide 400 mg, Oral, Daily    metoprolol succinate (TOPROL-XL) 25 mg, Oral    omega-3 fatty acids/fish oil (FISH OIL-OMEGA-3 FATTY ACIDS) 300-1,000 mg capsule 1 capsule, Oral, Daily    rosuvastatin (CRESTOR) 5 mg, Oral, Daily    vitamin D (VITAMIN D3) 1,000 Units, Oral, Daily          Assessment & Plan     Palpitations  Patient has been doing fairly well on the current regimen maintain on low doses of beta-blocker with metoprolol succinate 25 mg once daily encouraged her to consider changing this to Cardizem CD at 120 mg daily because of Raynaud's phenomenon.  We can gradually increase this as necessary this may cause a transient increase in heart rate.    Dyslipidemia  Dyslipidemia currently on Crestor 5 mg a day maintaining on low-salt low-fat diet encouraged her to repeat some blood work in the near future fasting    Generalized anxiety disorder  Clinically stable on the current therapy and this includes Cymbalta 60 mg daily and Wellbutrin daily.    Iron deficiency anemia due to chronic blood loss  Consider repeating the blood work in the near future she has been followed by hematologist          No follow-ups on file.

## 2024-03-21 ENCOUNTER — PATIENT MESSAGE (OUTPATIENT)
Dept: CARDIOLOGY | Facility: CLINIC | Age: 44
End: 2024-03-21
Payer: MEDICARE

## 2024-04-01 ENCOUNTER — TELEPHONE (OUTPATIENT)
Dept: CARDIOLOGY | Facility: CLINIC | Age: 44
End: 2024-04-01
Payer: MEDICARE

## 2024-04-01 NOTE — TELEPHONE ENCOUNTER
----- Message from Jade Ba, Patient Care Assistant sent at 4/1/2024  1:53 PM CDT -----  Regarding: results  Contact: pt  Type:  Test Results    Who Called:  pt     Name of Test (Lab/Mammo/Etc): labs     Date of Test:  3/14/24    Ordering Provider:  Mikie     Where the test was performed:  [facility]    Best Call Back Number:  [phone] (home)     Additional Information:  please call to advise. Thanks!

## 2024-04-03 ENCOUNTER — PATIENT MESSAGE (OUTPATIENT)
Dept: CARDIOLOGY | Facility: CLINIC | Age: 44
End: 2024-04-03
Payer: MEDICARE

## 2024-05-13 ENCOUNTER — TELEPHONE (OUTPATIENT)
Facility: CLINIC | Age: 44
End: 2024-05-13
Payer: MEDICARE

## 2024-05-15 ENCOUNTER — TELEPHONE (OUTPATIENT)
Facility: CLINIC | Age: 44
End: 2024-05-15
Payer: MEDICARE

## 2024-05-31 ENCOUNTER — OFFICE VISIT (OUTPATIENT)
Facility: CLINIC | Age: 44
End: 2024-05-31
Payer: MEDICARE

## 2024-05-31 VITALS
BODY MASS INDEX: 40.43 KG/M2 | TEMPERATURE: 98 F | HEART RATE: 98 BPM | HEIGHT: 64 IN | RESPIRATION RATE: 18 BRPM | DIASTOLIC BLOOD PRESSURE: 79 MMHG | SYSTOLIC BLOOD PRESSURE: 124 MMHG | WEIGHT: 236.81 LBS

## 2024-05-31 DIAGNOSIS — C50.812 MALIGNANT NEOPLASM OF OVERLAPPING SITES OF LEFT BREAST IN FEMALE, ESTROGEN RECEPTOR NEGATIVE: ICD-10-CM

## 2024-05-31 DIAGNOSIS — Z17.1 MALIGNANT NEOPLASM OF OVERLAPPING SITES OF LEFT BREAST IN FEMALE, ESTROGEN RECEPTOR NEGATIVE: ICD-10-CM

## 2024-05-31 DIAGNOSIS — S20.111A ABRASION OF RIGHT BREAST, INITIAL ENCOUNTER: Primary | ICD-10-CM

## 2024-05-31 DIAGNOSIS — D50.9 IRON DEFICIENCY ANEMIA, UNSPECIFIED IRON DEFICIENCY ANEMIA TYPE: ICD-10-CM

## 2024-05-31 DIAGNOSIS — M79.89 NODULE OF SOFT TISSUE: ICD-10-CM

## 2024-05-31 PROCEDURE — 99214 OFFICE O/P EST MOD 30 MIN: CPT | Mod: PBBFAC,PN | Performed by: INTERNAL MEDICINE

## 2024-05-31 PROCEDURE — 99215 OFFICE O/P EST HI 40 MIN: CPT | Mod: S$PBB,,, | Performed by: INTERNAL MEDICINE

## 2024-05-31 PROCEDURE — G2211 COMPLEX E/M VISIT ADD ON: HCPCS | Mod: S$PBB,,, | Performed by: INTERNAL MEDICINE

## 2024-05-31 PROCEDURE — 99999 PR PBB SHADOW E&M-EST. PATIENT-LVL IV: CPT | Mod: PBBFAC,,, | Performed by: INTERNAL MEDICINE

## 2024-06-01 ENCOUNTER — PATIENT MESSAGE (OUTPATIENT)
Facility: CLINIC | Age: 44
End: 2024-06-01
Payer: MEDICARE

## 2024-06-02 DIAGNOSIS — M25.612 DECREASED ROM OF LEFT SHOULDER: Primary | ICD-10-CM

## 2024-06-02 RX ORDER — CEPHALEXIN 500 MG/1
500 CAPSULE ORAL EVERY 8 HOURS
Qty: 30 CAPSULE | Refills: 0 | Status: SHIPPED | OUTPATIENT
Start: 2024-06-02 | End: 2024-06-12

## 2024-06-03 ENCOUNTER — TELEPHONE (OUTPATIENT)
Facility: CLINIC | Age: 44
End: 2024-06-03
Payer: MEDICARE

## 2024-06-03 DIAGNOSIS — S20.111A ABRASION OF RIGHT BREAST, INITIAL ENCOUNTER: ICD-10-CM

## 2024-06-03 DIAGNOSIS — C50.812 MALIGNANT NEOPLASM OF OVERLAPPING SITES OF LEFT BREAST IN FEMALE, ESTROGEN RECEPTOR NEGATIVE: Primary | ICD-10-CM

## 2024-06-03 DIAGNOSIS — Z17.1 MALIGNANT NEOPLASM OF OVERLAPPING SITES OF LEFT BREAST IN FEMALE, ESTROGEN RECEPTOR NEGATIVE: Primary | ICD-10-CM

## 2024-06-03 DIAGNOSIS — M25.612 DECREASED ROM OF LEFT SHOULDER: Primary | ICD-10-CM

## 2024-06-03 DIAGNOSIS — D50.9 IRON DEFICIENCY ANEMIA, UNSPECIFIED IRON DEFICIENCY ANEMIA TYPE: ICD-10-CM

## 2024-06-03 NOTE — PROGRESS NOTES
"[facility] [address] Hematology Oncology In Office Subsequent Encounter Note    5/31/24      ELROY Holland MD Mark Nicaud, MD    C/O:  breast cancer follow up    Triple negative. Dx 9/2019.  Approaching 4 1/2 years out since Dx in 9/2023.  Willard adjuvant chemo x's 6.  Surgery, CRISTIAN.  Reconstructed.  Needs further surgery to "clean up" breast.  Hgb 10.  Ferritin decreased, heavy menses. Check stool for heme.  Discussed po Fe pills vs IV Fe infusion weekly x's 2 weeks.  She agrees to Fe infusions.  She has tolerated Fe po poorly in the past.  It is iron infusions were given and energy is improved.  Hemoglobin is normal at this time and ferritin is normal at this time.    After Fe infusion, Hgb 13.6 and Ferritin now at 37.  Discussed and ordered Injectafer 750 mg HH x's 1 to replenish Fe stores.  RTC 6 months with lab HH.      She had bilateral mastectomy with flap reconstruction surgery on June 4th, 2020.   Dr. Castellon.  S/P chemo x's 6 cycles  Neoadjuvant Rx.   Echo ej fx NL.    She had a nightmare and got up quickly and tripped and fell and hit her breast on a stool.  She was black and blue.  She has a small abrasion at the 7 o'clock position of the right breast and at the left anterior axillary line there is a palpable movable lymph node on exam'.  She has reconstructed breast with extensive fat in the breast area.  She complains of pain and decreased range of motion at the left shoulder area.    Discussed with the radiologist, he recommends an x-ray of the shoulder and an ultrasound of the left chest wall to check on the palpable nodule and to see if this is something that could be biopsy able if needed.    She had placed a heating pad to the right breast after falling, and 2 or 3 days later she developed a blister there which popped, and then 1 or 2 weeks later the bridge abrasion like area recurred.  Have given her Keflex 500 mg 1 p.o. t.i.d. times 10 days to see if this will heal.  " She is allergic to penicillin but has taken Keflex before.    When she goes for the x-ray and ultrasound of the left chest wall, will order CBC, ferritin, and tumor markers.    She does have a [employer] nervous Systems shunt placed in 2006.  Will discuss with radiologist to see if MRI of breast is feasible in the setting of the shunt.    EGD, colonoscopy 12/2022 NL.  Repeat in 2 years.  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    L breast mamm and u/s 2/26/20.  Mamm/U/S mass effect 2.8 cm to 1 cm, improved L breast.  R breast with fat necrosis.    Considered getting a MRI, but she has CNS shunt in place, S/P surgery for AVM.    She had L mastectomy and Sentinal node bx.  Also R prophylactic mastectomy with  Bilateral reconstruction.  Estimated 10% risk of Ca recurrence on chest wall.    PN sx have increased since completing chemo.  Joint pain sx increased. Cause?  Trial of lyrica 50mg 1 TID.    The ultrasound showed that the breast mass had resolved and was not seen on the ultrasound study.  This is a very good response to the 6 cycles of neoadjuvant chemotherapy.  Pre op u/s.    She told me CRISTIAN in path report.  Original pathology report from [facility] was triple negative.  [facility] showed no evidence of disease in the left or right breast or in the sentinel node biopsy.    She achieved complete remission status with the neoadjuvant chemotherapy x6 cycles.    On examination today examination today, she does not have palpable lymphadenopathy at the cervical, supraclavicular, or axillary areas at the left or right.    The left and right breast shows extensive postoperative change however the incisions are closed and healing, the areas are nontender without palpable mass, ecchymoses have resolved.  The abdominal area shows that the anterior incision is closed,  well-healed, nontender without ecchymoses.    She had  bilateral mastectomy with reconstruction.  I would estimate her risk of developing new primary breast cancer at 3% when followed over the next many years.  The original breast cancer was triple negative.  I have not recommended tamoxifen or Arimidex adjuvantly or preventively for her.     She had further surgery to get symmetrical appearance to the left and right breast .  The incision sites are closed and well healed and the breast are nontender  she does not have palpable mass at the left or right breast.    She has keloid formation with large scaring at L & R back areas.  Her lungs are clear bilaterally, breathing is unlabored  She has regular rhythm without murmur, chest wall is nontender.  Abdomen is nontender without distention and liver and spleen are not palpable  She does not have CVA tenderness  Calves are nontender without edema or petechiae  Neurologically she is grossly intact.  She does not have palpable lymphadenopathy on exam    She had a hemoglobin at 10.  Ferritin 14.  Injectafer weekly x's 2, [facility].  Completed.  Check stools for heme.    Now Hgb stable 13.6,  and Ferritin is down to 37.  Give injectafer x's 1 at .    RTC 6 months.  CBC, CMP, ferritin, tumor markers 6 months.

## 2024-06-06 ENCOUNTER — TELEPHONE (OUTPATIENT)
Facility: CLINIC | Age: 44
End: 2024-06-06
Payer: MEDICARE

## 2024-06-10 ENCOUNTER — HOSPITAL ENCOUNTER (EMERGENCY)
Facility: HOSPITAL | Age: 44
Discharge: HOME | End: 2024-06-11
Attending: EMERGENCY MEDICINE
Payer: MEDICARE

## 2024-06-10 DIAGNOSIS — R00.2 PALPITATION: Primary | ICD-10-CM

## 2024-06-10 DIAGNOSIS — R07.9 CHEST PAIN: ICD-10-CM

## 2024-06-10 LAB
ALBUMIN SERPL BCP-MCNC: 4.3 G/DL (ref 3.5–5.2)
ALP SERPL-CCNC: 61 U/L (ref 55–135)
ALT SERPL W/O P-5'-P-CCNC: 25 U/L (ref 10–44)
ANION GAP SERPL CALC-SCNC: 9 MMOL/L (ref 8–16)
AST SERPL-CCNC: 26 U/L (ref 10–40)
BASOPHILS # BLD AUTO: 0.03 K/UL (ref 0–0.2)
BASOPHILS NFR BLD: 0.4 % (ref 0–1.9)
BILIRUB SERPL-MCNC: 0.5 MG/DL (ref 0.1–1)
BNP SERPL-MCNC: 149 PG/ML (ref 0–99)
BUN SERPL-MCNC: 14 MG/DL (ref 6–20)
CALCIUM SERPL-MCNC: 9.7 MG/DL (ref 8.7–10.5)
CHLORIDE SERPL-SCNC: 99 MMOL/L (ref 95–110)
CO2 SERPL-SCNC: 29 MMOL/L (ref 23–29)
CREAT SERPL-MCNC: 0.7 MG/DL (ref 0.5–1.4)
DIFFERENTIAL METHOD BLD: ABNORMAL
EOSINOPHIL # BLD AUTO: 0 K/UL (ref 0–0.5)
EOSINOPHIL NFR BLD: 0.5 % (ref 0–8)
ERYTHROCYTE [DISTWIDTH] IN BLOOD BY AUTOMATED COUNT: 12.5 % (ref 11.5–14.5)
EST. GFR  (NO RACE VARIABLE): >60 ML/MIN/1.73 M^2
GLUCOSE SERPL-MCNC: 99 MG/DL (ref 70–110)
HCT VFR BLD AUTO: 41.7 % (ref 37–48.5)
HGB BLD-MCNC: 14.3 G/DL (ref 12–16)
IMM GRANULOCYTES # BLD AUTO: 0.01 K/UL (ref 0–0.04)
IMM GRANULOCYTES NFR BLD AUTO: 0.1 % (ref 0–0.5)
LYMPHOCYTES # BLD AUTO: 1.9 K/UL (ref 1–4.8)
LYMPHOCYTES NFR BLD: 22.6 % (ref 18–48)
MAGNESIUM SERPL-MCNC: 2 MG/DL (ref 1.6–2.6)
MCH RBC QN AUTO: 32.6 PG (ref 27–31)
MCHC RBC AUTO-ENTMCNC: 34.3 G/DL (ref 32–36)
MCV RBC AUTO: 95 FL (ref 82–98)
MONOCYTES # BLD AUTO: 0.8 K/UL (ref 0.3–1)
MONOCYTES NFR BLD: 8.9 % (ref 4–15)
NEUTROPHILS # BLD AUTO: 5.8 K/UL (ref 1.8–7.7)
NEUTROPHILS NFR BLD: 67.5 % (ref 38–73)
NRBC BLD-RTO: 0 /100 WBC
PLATELET # BLD AUTO: 301 K/UL (ref 150–450)
PMV BLD AUTO: 8.3 FL (ref 9.2–12.9)
POTASSIUM SERPL-SCNC: 3.7 MMOL/L (ref 3.5–5.1)
PROT SERPL-MCNC: 7.6 G/DL (ref 6–8.4)
RBC # BLD AUTO: 4.39 M/UL (ref 4–5.4)
SODIUM SERPL-SCNC: 137 MMOL/L (ref 136–145)
TROPONIN I SERPL HS-MCNC: 3.6 PG/ML (ref 0–14.9)
WBC # BLD AUTO: 8.51 K/UL (ref 3.9–12.7)

## 2024-06-10 PROCEDURE — 93010 ELECTROCARDIOGRAM REPORT: CPT | Mod: ,,, | Performed by: GENERAL PRACTICE

## 2024-06-10 PROCEDURE — 99285 EMERGENCY DEPT VISIT HI MDM: CPT | Mod: 25

## 2024-06-10 PROCEDURE — 83880 ASSAY OF NATRIURETIC PEPTIDE: CPT | Performed by: PHYSICIAN ASSISTANT

## 2024-06-10 PROCEDURE — 80053 COMPREHEN METABOLIC PANEL: CPT | Performed by: PHYSICIAN ASSISTANT

## 2024-06-10 PROCEDURE — 93005 ELECTROCARDIOGRAM TRACING: CPT | Performed by: GENERAL PRACTICE

## 2024-06-10 PROCEDURE — 85025 COMPLETE CBC W/AUTO DIFF WBC: CPT | Performed by: PHYSICIAN ASSISTANT

## 2024-06-10 PROCEDURE — 84443 ASSAY THYROID STIM HORMONE: CPT | Performed by: PHYSICIAN ASSISTANT

## 2024-06-10 PROCEDURE — 83735 ASSAY OF MAGNESIUM: CPT | Performed by: PHYSICIAN ASSISTANT

## 2024-06-10 PROCEDURE — 84484 ASSAY OF TROPONIN QUANT: CPT | Performed by: PHYSICIAN ASSISTANT

## 2024-06-11 VITALS
RESPIRATION RATE: 21 BRPM | HEART RATE: 101 BPM | HEIGHT: 64 IN | DIASTOLIC BLOOD PRESSURE: 79 MMHG | WEIGHT: 230 LBS | TEMPERATURE: 99 F | OXYGEN SATURATION: 99 % | SYSTOLIC BLOOD PRESSURE: 136 MMHG | BODY MASS INDEX: 39.27 KG/M2

## 2024-06-11 LAB
BILIRUB UR QL STRIP: NEGATIVE
CLARITY UR: CLEAR
COLOR UR: YELLOW
GLUCOSE UR QL STRIP: NEGATIVE
HGB UR QL STRIP: NEGATIVE
KETONES UR QL STRIP: NEGATIVE
LEUKOCYTE ESTERASE UR QL STRIP: NEGATIVE
NITRITE UR QL STRIP: NEGATIVE
OHS QRS DURATION: 74 MS
OHS QTC CALCULATION: 454 MS
PH UR STRIP: 7 [PH] (ref 5–8)
PROT UR QL STRIP: NEGATIVE
SP GR UR STRIP: 1.01 (ref 1–1.03)
TROPONIN I SERPL HS-MCNC: 3.6 PG/ML (ref 0–14.9)
TSH SERPL DL<=0.005 MIU/L-ACNC: 3.57 UIU/ML (ref 0.34–5.6)
URN SPEC COLLECT METH UR: NORMAL
UROBILINOGEN UR STRIP-ACNC: NEGATIVE EU/DL

## 2024-06-11 PROCEDURE — 96365 THER/PROPH/DIAG IV INF INIT: CPT

## 2024-06-11 PROCEDURE — 36415 COLL VENOUS BLD VENIPUNCTURE: CPT | Performed by: PHYSICIAN ASSISTANT

## 2024-06-11 PROCEDURE — 25000003 PHARM REV CODE 250: Performed by: EMERGENCY MEDICINE

## 2024-06-11 PROCEDURE — 84484 ASSAY OF TROPONIN QUANT: CPT | Performed by: PHYSICIAN ASSISTANT

## 2024-06-11 PROCEDURE — 81003 URINALYSIS AUTO W/O SCOPE: CPT | Performed by: EMERGENCY MEDICINE

## 2024-06-11 PROCEDURE — 63600175 PHARM REV CODE 636 W HCPCS: Performed by: EMERGENCY MEDICINE

## 2024-06-11 PROCEDURE — 96361 HYDRATE IV INFUSION ADD-ON: CPT

## 2024-06-11 RX ORDER — POTASSIUM CHLORIDE 20 MEQ/1
40 TABLET, EXTENDED RELEASE ORAL ONCE
Status: COMPLETED | OUTPATIENT
Start: 2024-06-11 | End: 2024-06-11

## 2024-06-11 RX ORDER — MAGNESIUM SULFATE 1 G/100ML
1 INJECTION INTRAVENOUS ONCE
Status: COMPLETED | OUTPATIENT
Start: 2024-06-11 | End: 2024-06-11

## 2024-06-11 RX ADMIN — SODIUM CHLORIDE, POTASSIUM CHLORIDE, SODIUM LACTATE AND CALCIUM CHLORIDE 1000 ML: 600; 310; 30; 20 INJECTION, SOLUTION INTRAVENOUS at 07:06

## 2024-06-11 RX ADMIN — POTASSIUM CHLORIDE 40 MEQ: 1500 TABLET, EXTENDED RELEASE ORAL at 10:06

## 2024-06-11 RX ADMIN — MAGNESIUM SULFATE HEPTAHYDRATE 1 G: 1 INJECTION, SOLUTION INTRAVENOUS at 10:06

## 2024-06-11 NOTE — PLAN OF CARE
06/11/24 1236   Discharge Reassessment   Assessment Type Discharge Planning Reassessment   Did the patient's condition or plan change since previous assessment? Yes   Post-Acute Status   Discharge Delays None known at this time     Pt scheduled for Stress Test on Friday, June 14, 2024, Pt will be contacted the day before with instructions and arrival time.

## 2024-06-11 NOTE — DISCHARGE INSTRUCTIONS
You have been scheduled for outpatient stress test.  You will be notified in the next 24 hours with your appointment.  Follow up with Cardiology as scheduled.    Return to emergency department if problems persist worsens additional concerns.

## 2024-06-11 NOTE — ED PROVIDER NOTES
Encounter Date: 6/10/2024       History     Chief Complaint   Patient presents with    Palpitations     Pt takes dilt but denies hx of afib.  Pt says she has been feeling like her heart was racing today     43-year-old female with history of hyperlipidemia, palpitations, anxiety, previous breast cancer, mitral valve prolapse, anemia.  Patient followed by Dr. Deluna cardiology.  Currently on Cardizem.  Patient presents emergency department with complaint palpitations which was noted since yesterday.  Patient states felt like her heart was racing.  At that time she did experience sharp midsternal chest pain.  Stated that it was non radiation.  She denied diaphoresis, no shortness of breath, no weakness or dizziness.  Denies any other constitutional symptoms.  Patient states that currently her symptoms have abated.  No active chest pain at this time.  Patient states that she does get chest pain from time to time she has been told however that this is related to her mitral valve prolapse.      Review of patient's allergies indicates:   Allergen Reactions    Adhesive tape-silicones Rash    Codeine Itching    Gabapentin Other (See Comments)     Vivid dreams    Meperidine Other (See Comments)     hallucinations  Other reaction(s): Other (See Comments)  Hallucinations    Adhesive Rash    Penicillins Hives and Rash    Ultram [tramadol] Nausea Only     Past Medical History:   Diagnosis Date    Anxiety 2006    AVM (arteriovenous malformation) 2006    Balance disorder 2006    from AVM  uses walker    Breast cancer 08/2019    left    Edema     Fractures 2012    left foot    MVP (mitral valve prolapse) 1997    Reflux     Seroma of breast     recurrent seroma to left breast.     Wears glasses      Past Surgical History:   Procedure Laterality Date    BILATERAL MASTECTOMY  06/04/2020    BRAIN AVM REPAIR  2006    BREAST BIOPSY  left    2002    BREAST RECONSTRUCTION  06/04/2020    BREAST RECONSTRUCTION  06/04/2020    COLONOSCOPY N/A  2020    Procedure: COLONOSCOPY;  Surgeon: Librado Monroe MD;  Location: [facility] ENDO;  Service: Endoscopy;  Laterality: N/A;    COLONOSCOPY N/A 2022    Procedure: COLONOSCOPY;  Surgeon: Librado Monroe MD;  Location: [facility] ENDO;  Service: Endoscopy;  Laterality: N/A;    CRANIOTOMY  2006    CVA tumor removal  2006    ESOPHAGOGASTRODUODENOSCOPY N/A 2020    Procedure: EGD (ESOPHAGOGASTRODUODENOSCOPY);  Surgeon: Librado Monroe MD;  Location: [facility] ENDO;  Service: Endoscopy;  Laterality: N/A;    FOOT FRACTURE SURGERY Left     INSERTION OF CATHETER Right 2019    Procedure: INSERTION, CATHETER;  Surgeon: Caitlyn Elkins MD;  Location: [facility] OR;  Service: General;  Laterality: Right;    removal port of cath      REVISION OF VASCULAR ACCESS PORT Right 2019    Procedure: REVISION, VASCULAR ACCESS PORT;  Surgeon: Caitlyn Elkins MD;  Location: [facility] OR;  Service: General;  Laterality: Right;  WOUND OPENED AND PORT FLIPPED.    VENTRICULOPERITONEAL SHUNT      MAGNETIC - NO MRI     Family History   Problem Relation Name Age of Onset    Diabetes Neg Hx       Social History     Tobacco Use    Smoking status: Former     Current packs/day: 0.00     Average packs/day: 0.3 packs/day for 4.0 years (1.0 ttl pk-yrs)     Types: Cigarettes     Start date: 2008     Quit date: 2012     Years since quittin.0    Smokeless tobacco: Never   Substance Use Topics    Alcohol use: Yes     Alcohol/week: 27.0 standard drinks of alcohol     Types: 21 Glasses of wine, 4 Cans of beer, 2 Shots of liquor per week    Drug use: No     Review of Systems   Constitutional:  Negative for fever.   HENT:  Negative for sore throat.    Respiratory:  Negative for shortness of breath.    Cardiovascular:  Positive for chest pain and palpitations. Negative for leg swelling.   Gastrointestinal:  Negative for abdominal pain, nausea and vomiting.   Genitourinary:  Negative for difficulty urinating and dysuria.   Musculoskeletal:   Negative for back pain.   Skin:  Negative for rash.   Neurological:  Negative for weakness.   Hematological:  Does not bruise/bleed easily.       Physical Exam     Initial Vitals [06/10/24 2134]   BP Pulse Resp Temp SpO2   (!) 177/97 (!) 114 18 98.9 °F (37.2 °C) 98 %      MAP       --         Physical Exam    Nursing note and vitals reviewed.  Constitutional: She appears well-developed and well-nourished.   HENT:   Head: Normocephalic and atraumatic.   Nose: Nose normal.   Mouth/Throat: Oropharynx is clear and moist.   Eyes: Conjunctivae and EOM are normal. Pupils are equal, round, and reactive to light.   Neck: Neck supple. No thyromegaly present. No tracheal deviation present.   Normal range of motion.  Cardiovascular:  Normal rate, regular rhythm and intact distal pulses.     Exam reveals no gallop and no friction rub.       Murmur (2/6 admitted systolic click heard best at left lower sternal border) heard.  Pulmonary/Chest: No stridor. No respiratory distress.   Course bilateral breath sounds no adventitious sounds     Abdominal: Abdomen is soft. Bowel sounds are normal. She exhibits no mass. There is no rebound and no guarding.   Musculoskeletal:         General: No edema. Normal range of motion.      Cervical back: Normal range of motion and neck supple.     Lymphadenopathy:     She has no cervical adenopathy.   Neurological: She is alert and oriented to person, place, and time. She has normal strength and normal reflexes. GCS score is 15. GCS eye subscore is 4. GCS verbal subscore is 5. GCS motor subscore is 6.   Skin: Skin is warm and dry. Capillary refill takes less than 2 seconds.   Psychiatric: She has a normal mood and affect.         ED Course   Procedures  Labs Reviewed   CBC W/ AUTO DIFFERENTIAL - Abnormal; Notable for the following components:       Result Value    MCH 32.6 (*)     MPV 8.3 (*)     All other components within normal limits   B-TYPE NATRIURETIC PEPTIDE - Abnormal; Notable for the  following components:     (*)     All other components within normal limits   MAGNESIUM   COMPREHENSIVE METABOLIC PANEL   TROPONIN I HIGH SENSITIVITY   TROPONIN I HIGH SENSITIVITY   TSH   URINALYSIS, REFLEX TO URINE CULTURE    Narrative:     Specimen Source->Urine   TSH        ECG Results              EKG 12-lead (In process)        Collection Time Result Time QRS Duration OHS QTC Calculation    06/10/24 21:41:27 06/11/24 05:12:46 74 454                     In process by Interface, Lab In [facility] (06/11/24 05:12:50)                   Narrative:    Test Reason : R07.9,    Vent. Rate : 121 BPM     Atrial Rate : 121 BPM     P-R Int : 154 ms          QRS Dur : 074 ms      QT Int : 320 ms       P-R-T Axes : 072 054 069 degrees     QTc Int : 454 ms     Suspect unspecified pacemaker failure  Sinus tachycardia  Possible Left atrial enlargement  Cannot rule out Anterior infarct ,age undetermined  Abnormal ECG  When compared with ECG of 27-DEC-2022 14:15,  No significant change was found    Referred By: [email or web address]   SELF           Confirmed By:                                   Imaging Results              X-Ray Chest AP Portable (Final result)  Result time 06/10/24 22:57:15      Final result by Marcell Cloon MD (06/10/24 22:57:15)                   Impression:      No acute findings in the chest.      Electronically signed by: Marcell Colon  Date:    06/10/2024  Time:    22:57               Narrative:    EXAMINATION:  XR CHEST AP PORTABLE    CLINICAL HISTORY:  Chest Pain;    TECHNIQUE:  Single frontal view of the chest was performed.    COMPARISON:  04/04/2022    FINDINGS:  The heart mediastinal contours remain stable.  Double density in the heart may represent chamber enlargement or hiatal hernia.   shunt catheter remains.  The lungs are clear.                                       Medications   lactated ringers bolus 1,000 mL (0 mLs Intravenous Stopped 6/11/24 0825)   magnesium sulfate in dextrose IVPB  (premix) 1 g (1 g Intravenous New Bag 6/11/24 1046)   potassium chloride SA CR tablet 40 mEq (40 mEq Oral Given 6/11/24 1046)     Medical Decision Making  Amount and/or Complexity of Data Reviewed  Labs: ordered.  Radiology: ordered.    Risk  Prescription drug management.               ED Course as of 06/11/24 1154   Tue Jun 11, 2024   1151 Patient seen evaluated emergency department.  Currently at this time patient found to be chest pain-free.  Patient did state she had palpitations as well as midsternal chest pain which has resolved.  Currently has been chest pain-free for several hours.  Workup in emergency department patient underwent serial cardiac markers which was found to be normal with troponin of 3.6.  TSH found to be normal at 3.5.  CBC found with H&H of 14 and 41 white count of 8.5 platelets of 301.  Chemistry found to be normal.  Patient after several hours of telemetry monitoring found with no arrhythmia.  Was given IV hydration as well as supplementation with magnesium 1 g IV piggyback as well as potassium 40 mEq p.o. given x1.  Patient was scheduled for outpatient stress test to be performed this week.  Patient also had cardiology appointment moved up to follow with Dr. Deluna within next 1-2 weeks.  She is instructed to continue on Cardizem.  Drink plenty of fluids.  Rest as much as possible.  She is to return to emergency department if problems persist worsens or additional concerns. [RM]      ED Course User Index  [RM] Mehrdad Harrington MD               Medical Decision Making:   Initial Assessment:   43-year-old female with history of hyperlipidemia, palpitations, anxiety, previous breast cancer, mitral valve prolapse, anemia.  Patient followed by Dr. Deluna cardiology.  Currently on Cardizem.  Patient presents emergency department with complaint palpitations which was noted since yesterday.  Patient states felt like her heart was racing.  At that time she did experience sharp midsternal chest  pain.  Stated that it was non radiation.  She denied diaphoresis, no shortness of breath, no weakness or dizziness.  Denies any other constitutional symptoms.  Patient states that currently her symptoms have abated.  No active chest pain at this time.  Patient states that she does get chest pain from time to time she has been told however that this is related to her mitral valve prolapse.    Differential Diagnosis:   Musculoskeletal chest wall pain, might try valve prolapse, arrhythmia, ACS  Clinical Tests:   Lab Tests: Ordered and Reviewed  Radiological Study: Ordered and Reviewed  Medical Tests: Ordered and Reviewed             Clinical Impression:  Final diagnoses:  [R07.9] Chest pain  [R00.2] Palpitation (Primary)          ED Disposition Condition    Discharge Stable          ED Prescriptions    None       Follow-up Information       Follow up With Specialties Details Why Contact Info    Sasha Fish, FNP Emergency Medicine, Family Medicine Schedule an appointment as soon as possible for a visit in 1 week For recheck/continuing care [address] [facility] [place] [address]  [phone]      Nagi Deluna MD Interventional Cardiology, Cardiology Schedule an appointment as soon as possible for a visit in 1 week For recheck/continuing care [address] [facility] [address]  [facility]  [facility] [address]  [phone]               Mehrdad Harrington MD  06/11/24 1096

## 2024-06-11 NOTE — FIRST PROVIDER EVALUATION
Emergency Department TeleTriage Encounter Note      CHIEF COMPLAINT    Chief Complaint   Patient presents with    Palpitations     Pt takes dilt but denies hx of afib.  Pt says she has been feeling like her heart was racing today       VITAL SIGNS   Initial Vitals [06/10/24 2134]   BP Pulse Resp Temp SpO2   (!) 177/97 (!) 114 18 98.9 °F (37.2 °C) 98 %      MAP       --            ALLERGIES    Review of patient's allergies indicates:   Allergen Reactions    Adhesive tape-silicones Rash    Codeine Itching    Gabapentin Other (See Comments)     Vivid dreams    Meperidine Other (See Comments)     hallucinations  Other reaction(s): Other (See Comments)  Hallucinations    Adhesive Rash    Penicillins Hives and Rash    Ultram [tramadol] Nausea Only       PROVIDER TRIAGE NOTE  42 yo F with palpitations since this am.  Reports history of mitral valve prolapse.  Patient is prescribed diltiazem.  Denies any history of AFib.  Reports intermittent chest pain throughout the day, no shortness a breath.  No nausea or vomiting.  No abdominal pain.  Patient was tachycardic currently.  Appears well and nontoxic.      ORDERS  Labs Reviewed - No data to display    ED Orders (720h ago, onward)      None              Virtual Visit Note: The provider triage portion of this emergency department evaluation and documentation was performed via [employer] a HIPAA-compliant telemedicine application, in concert with a tele-presenter in the room. A face to face patient evaluation with one of my colleagues will occur once the patient is placed in an emergency department room.      DISCLAIMER: This note was prepared with [employer] voice recognition transcription software. Garbled syntax, mangled pronouns, and other bizarre constructions may be attributed to that software system.

## 2024-06-12 ENCOUNTER — HOSPITAL ENCOUNTER (EMERGENCY)
Facility: HOSPITAL | Age: 44
Discharge: HOME | End: 2024-06-12
Attending: EMERGENCY MEDICINE
Payer: MEDICARE

## 2024-06-12 VITALS
OXYGEN SATURATION: 99 % | DIASTOLIC BLOOD PRESSURE: 93 MMHG | SYSTOLIC BLOOD PRESSURE: 170 MMHG | WEIGHT: 230 LBS | RESPIRATION RATE: 16 BRPM | BODY MASS INDEX: 39.48 KG/M2 | HEART RATE: 98 BPM | TEMPERATURE: 98 F

## 2024-06-12 DIAGNOSIS — R68.84 JAW PAIN: ICD-10-CM

## 2024-06-12 LAB
ALBUMIN SERPL BCP-MCNC: 4.1 G/DL (ref 3.5–5.2)
ALP SERPL-CCNC: 56 U/L (ref 55–135)
ALT SERPL W/O P-5'-P-CCNC: 27 U/L (ref 10–44)
ANION GAP SERPL CALC-SCNC: 6 MMOL/L (ref 8–16)
AST SERPL-CCNC: 27 U/L (ref 10–40)
BASOPHILS # BLD AUTO: 0.02 K/UL (ref 0–0.2)
BASOPHILS NFR BLD: 0.4 % (ref 0–1.9)
BILIRUB SERPL-MCNC: 0.7 MG/DL (ref 0.1–1)
BNP SERPL-MCNC: 97 PG/ML (ref 0–99)
BUN SERPL-MCNC: 11 MG/DL (ref 6–20)
CALCIUM SERPL-MCNC: 9.1 MG/DL (ref 8.7–10.5)
CHLORIDE SERPL-SCNC: 103 MMOL/L (ref 95–110)
CO2 SERPL-SCNC: 29 MMOL/L (ref 23–29)
CREAT SERPL-MCNC: 0.7 MG/DL (ref 0.5–1.4)
DIFFERENTIAL METHOD BLD: ABNORMAL
EOSINOPHIL # BLD AUTO: 0.1 K/UL (ref 0–0.5)
EOSINOPHIL NFR BLD: 1.7 % (ref 0–8)
ERYTHROCYTE [DISTWIDTH] IN BLOOD BY AUTOMATED COUNT: 12.7 % (ref 11.5–14.5)
EST. GFR  (NO RACE VARIABLE): >60 ML/MIN/1.73 M^2
GLUCOSE SERPL-MCNC: 103 MG/DL (ref 70–110)
HCT VFR BLD AUTO: 42.2 % (ref 37–48.5)
HGB BLD-MCNC: 13.9 G/DL (ref 12–16)
IMM GRANULOCYTES # BLD AUTO: 0.02 K/UL (ref 0–0.04)
IMM GRANULOCYTES NFR BLD AUTO: 0.4 % (ref 0–0.5)
LYMPHOCYTES # BLD AUTO: 1.3 K/UL (ref 1–4.8)
LYMPHOCYTES NFR BLD: 24.4 % (ref 18–48)
MAGNESIUM SERPL-MCNC: 2.1 MG/DL (ref 1.6–2.6)
MCH RBC QN AUTO: 31.8 PG (ref 27–31)
MCHC RBC AUTO-ENTMCNC: 32.9 G/DL (ref 32–36)
MCV RBC AUTO: 97 FL (ref 82–98)
MONOCYTES # BLD AUTO: 0.5 K/UL (ref 0.3–1)
MONOCYTES NFR BLD: 9.2 % (ref 4–15)
NEUTROPHILS # BLD AUTO: 3.4 K/UL (ref 1.8–7.7)
NEUTROPHILS NFR BLD: 63.9 % (ref 38–73)
NRBC BLD-RTO: 0 /100 WBC
OHS QRS DURATION: 74 MS
OHS QTC CALCULATION: 459 MS
PLATELET # BLD AUTO: 272 K/UL (ref 150–450)
PMV BLD AUTO: 8.6 FL (ref 9.2–12.9)
POTASSIUM SERPL-SCNC: 3.8 MMOL/L (ref 3.5–5.1)
PROT SERPL-MCNC: 7.4 G/DL (ref 6–8.4)
RBC # BLD AUTO: 4.37 M/UL (ref 4–5.4)
SODIUM SERPL-SCNC: 138 MMOL/L (ref 136–145)
TROPONIN I SERPL HS-MCNC: 2.9 PG/ML (ref 0–14.9)
WBC # BLD AUTO: 5.33 K/UL (ref 3.9–12.7)

## 2024-06-12 PROCEDURE — 25000003 PHARM REV CODE 250: Performed by: EMERGENCY MEDICINE

## 2024-06-12 PROCEDURE — 83735 ASSAY OF MAGNESIUM: CPT | Performed by: PHYSICIAN ASSISTANT

## 2024-06-12 PROCEDURE — 84484 ASSAY OF TROPONIN QUANT: CPT | Performed by: PHYSICIAN ASSISTANT

## 2024-06-12 PROCEDURE — 85025 COMPLETE CBC W/AUTO DIFF WBC: CPT | Performed by: PHYSICIAN ASSISTANT

## 2024-06-12 PROCEDURE — 93005 ELECTROCARDIOGRAM TRACING: CPT | Performed by: GENERAL PRACTICE

## 2024-06-12 PROCEDURE — 99285 EMERGENCY DEPT VISIT HI MDM: CPT | Mod: 25

## 2024-06-12 PROCEDURE — 80053 COMPREHEN METABOLIC PANEL: CPT | Performed by: PHYSICIAN ASSISTANT

## 2024-06-12 PROCEDURE — 83880 ASSAY OF NATRIURETIC PEPTIDE: CPT | Performed by: PHYSICIAN ASSISTANT

## 2024-06-12 PROCEDURE — 93010 ELECTROCARDIOGRAM REPORT: CPT | Mod: ,,, | Performed by: GENERAL PRACTICE

## 2024-06-12 RX ORDER — HYDRALAZINE HYDROCHLORIDE 25 MG/1
50 TABLET, FILM COATED ORAL
Status: COMPLETED | OUTPATIENT
Start: 2024-06-12 | End: 2024-06-12

## 2024-06-12 RX ADMIN — HYDRALAZINE HYDROCHLORIDE 50 MG: 25 TABLET ORAL at 11:06

## 2024-06-12 NOTE — DISCHARGE INSTRUCTIONS
Stress test is scheduled for the 14th.  Please continue with this plan for stress testing.  Return to ER if any worsening chest pain, jaw pain or any concern.

## 2024-06-12 NOTE — FIRST PROVIDER EVALUATION
Emergency Department TeleTriage Encounter Note      CHIEF COMPLAINT    Chief Complaint   Patient presents with    Jaw Pain     Left sided jaw pain since 7am that woke pt up from sleeping 5/10 pain - bilateral tingling in hands and left foot        VITAL SIGNS   Initial Vitals [06/12/24 0846]   BP Pulse Resp Temp SpO2   (!) 138/104 105 18 98.5 °F (36.9 °C) 99 %      MAP       --            ALLERGIES    Review of patient's allergies indicates:   Allergen Reactions    Adhesive tape-silicones Rash    Codeine Itching    Gabapentin Other (See Comments)     Vivid dreams    Meperidine Other (See Comments)     hallucinations  Other reaction(s): Other (See Comments)  Hallucinations    Adhesive Rash    Penicillins Hives and Rash    Ultram [tramadol] Nausea Only       PROVIDER TRIAGE NOTE  This is a teletriage evaluation of a 43 y.o. female presenting to the ED complaining of jaw pain. Patient reports being woken up by sharp stabbing left sided neck pain this morning. She denies chest pain or shortness of breath. She has tingling in bilateral hands and her left foot. She took her regular medications this morning.    Patient is alert and oriented. She speaks in complete sentences. She is sitting upright in the chair in no distress but appears anxious.    Initial orders will be placed and care will be transferred to an alternate provider when patient is roomed for a full evaluation. Any additional orders and the final disposition will be determined by that provider.         ORDERS  Labs Reviewed   MAGNESIUM   CBC W/ AUTO DIFFERENTIAL   COMPREHENSIVE METABOLIC PANEL   TROPONIN I HIGH SENSITIVITY   B-TYPE NATRIURETIC PEPTIDE       ED Orders (720h ago, onward)      Start Ordered     Status Ordering Provider    06/12/24 1118 06/12/24 0917  Troponin I High Sensitivity #2  Once Timed         Ordered GURINDER VILLELA    06/12/24 0918 06/12/24 0917  Magnesium  STAT         Ordered GURINDER VILLELA    06/12/24 0918 06/12/24 0917  Vital  signs  Every 15 min         Ordered GURINDER VILLELA.    06/12/24 0918 06/12/24 0917  Cardiac Monitoring - Adult  Continuous        Comments: Notify Physician If:    Ordered GURINDER VILLELA.    06/12/24 0918 06/12/24 0917  Pulse Oximetry Continuous  Continuous         Ordered GURINDER VILLELA.    06/12/24 0918 06/12/24 0917  Diet NPO  Diet effective now         Ordered GURINDER VILLELA.    06/12/24 0918 06/12/24 0917  Saline lock IV  Once         Ordered GURINDER VILLELA.    06/12/24 0918 06/12/24 0917  EKG 12-lead  Once        Comments: Do not perform if previously done during this visit/ triage    Ordered GURINDER VILLELA.    06/12/24 0918 06/12/24 0917  CBC auto differential  STAT         Ordered GURINDER VILLELA.    06/12/24 0918 06/12/24 0917  Comprehensive metabolic panel  STAT         Ordered UGRINDER VILLELA.    06/12/24 0918 06/12/24 0917  Troponin I High Sensitivity #1  STAT         Ordered GURINDER VILLELA.    06/12/24 0918 06/12/24 0917  B-Type natriuretic peptide (BNP)  STAT         Ordered GURINDER VILLELA.    06/12/24 0918 06/12/24 0917  X-Ray Chest AP Portable  1 time imaging         Ordered GURINDER VILLELA.              Virtual Visit Note: The provider triage portion of this emergency department evaluation and documentation was performed via [employer], a HIPAA-compliant telemedicine application, in concert with a tele-presenter in the room. A face to face patient evaluation with one of my colleagues will occur once the patient is placed in an emergency department room.      DISCLAIMER: This note was prepared with [employer] voice recognition transcription software. Garbled syntax, mangled pronouns, and other bizarre constructions may be attributed to that software system.

## 2024-06-13 ENCOUNTER — TELEPHONE (OUTPATIENT)
Facility: CLINIC | Age: 44
End: 2024-06-13
Payer: MEDICARE

## 2024-06-13 ENCOUNTER — TELEPHONE (OUTPATIENT)
Dept: CARDIOLOGY | Facility: HOSPITAL | Age: 44
End: 2024-06-13

## 2024-06-13 NOTE — TELEPHONE ENCOUNTER
Dr Serna reviewed and per his verbal order, patient okay to cancel today's appointment, keep appointment in July, and that he feels that her lump may have been from an injury from a fall previously and should resolve on its own. Attempted to call patient with above, no answer. Voicemail left with instructions to call back.

## 2024-06-13 NOTE — TELEPHONE ENCOUNTER
"----- Message from Consuelo Marin sent at 6/13/2024  9:12 AM CDT -----  Pt called and wants to cancel her appointment for today (1 pm), she has been in the ER this week (stated a few times) for palpations.    She is doing well.  Her appointment was scheduled because she "thought she found a lump" but now feels it was a scare and she has looked at her labs and they are good.    She would like to come in ONLY for her 6th month that is scheduled in July...  I did not cancel it.  She is not planning on coming in unless she hears from you otherwise.    Pt [phone]  "

## 2024-06-14 ENCOUNTER — CLINICAL SUPPORT (OUTPATIENT)
Dept: CARDIOLOGY | Facility: HOSPITAL | Age: 44
End: 2024-06-14
Attending: EMERGENCY MEDICINE
Payer: MEDICARE

## 2024-06-14 ENCOUNTER — HOSPITAL ENCOUNTER (OUTPATIENT)
Dept: RADIOLOGY | Facility: HOSPITAL | Age: 44
Discharge: HOME | End: 2024-06-14
Attending: EMERGENCY MEDICINE
Payer: MEDICARE

## 2024-06-14 DIAGNOSIS — R07.9 CHEST PAIN: ICD-10-CM

## 2024-06-14 LAB
CV PHARM DOSE: 0.4 MG
CV STRESS BASE HR: 92 BPM
DIASTOLIC BLOOD PRESSURE: 73 MMHG
OHS CV CPX 1 MINUTE RECOVERY HEART RATE: 125 BPM
OHS CV CPX 85 PERCENT MAX PREDICTED HEART RATE MALE: 150
OHS CV CPX MAX PREDICTED HEART RATE: 176
OHS CV CPX PATIENT IS FEMALE: 1
OHS CV CPX PATIENT IS MALE: 0
OHS CV CPX PEAK DIASTOLIC BLOOD PRESSURE: 79 MMHG
OHS CV CPX PEAK HEAR RATE: 125 BPM
OHS CV CPX PEAK RATE PRESSURE PRODUCT: NORMAL
OHS CV CPX PEAK SYSTOLIC BLOOD PRESSURE: 134 MMHG
OHS CV CPX PERCENT MAX PREDICTED HEART RATE ACHIEVED: 75
OHS CV CPX RATE PRESSURE PRODUCT PRESENTING: NORMAL
SYSTOLIC BLOOD PRESSURE: 136 MMHG

## 2024-06-14 PROCEDURE — 93017 CV STRESS TEST TRACING ONLY: CPT

## 2024-06-14 PROCEDURE — 63600175 PHARM REV CODE 636 W HCPCS: Performed by: EMERGENCY MEDICINE

## 2024-06-14 PROCEDURE — 93016 CV STRESS TEST SUPVJ ONLY: CPT | Mod: ,,, | Performed by: GENERAL PRACTICE

## 2024-06-14 PROCEDURE — 78452 HT MUSCLE IMAGE SPECT MULT: CPT | Mod: TC

## 2024-06-14 PROCEDURE — A9502 TC99M TETROFOSMIN: HCPCS | Performed by: EMERGENCY MEDICINE

## 2024-06-14 PROCEDURE — 93018 CV STRESS TEST I&R ONLY: CPT | Mod: ,,, | Performed by: GENERAL PRACTICE

## 2024-06-14 RX ORDER — REGADENOSON 0.08 MG/ML
0.4 INJECTION, SOLUTION INTRAVENOUS ONCE
Status: COMPLETED | OUTPATIENT
Start: 2024-06-14 | End: 2024-06-14

## 2024-06-14 RX ADMIN — TETROFOSMIN 11.1 MILLICURIE: 1.38 INJECTION, POWDER, LYOPHILIZED, FOR SOLUTION INTRAVENOUS at 10:06

## 2024-06-14 RX ADMIN — TETROFOSMIN 26.2 MILLICURIE: 1.38 INJECTION, POWDER, LYOPHILIZED, FOR SOLUTION INTRAVENOUS at 11:06

## 2024-06-14 RX ADMIN — REGADENOSON 0.4 MG: 0.08 INJECTION, SOLUTION INTRAVENOUS at 11:06

## 2024-06-14 NOTE — ED PROVIDER NOTES
Encounter Date: 6/12/2024       History     Chief Complaint   Patient presents with    Jaw Pain     Left sided jaw pain since 7am that woke pt up from sleeping 5/10 pain - bilateral tingling in hands and left foot      Patient presents complaining of left-sided jaw pain.  Patient reports recent heart evaluation and stress test upcoming on Friday.  Patient awoke with pain in the left jaw became concerned.  No direct chest pain.  No shortness a breath.  The worst symptoms are mild-to-moderate.  Nothing makes it better or worse.      Review of patient's allergies indicates:   Allergen Reactions    Adhesive tape-silicones Rash    Codeine Itching    Gabapentin Other (See Comments)     Vivid dreams    Meperidine Other (See Comments)     hallucinations  Other reaction(s): Other (See Comments)  Hallucinations    Adhesive Rash    Penicillins Hives and Rash    Ultram [tramadol] Nausea Only     Past Medical History:   Diagnosis Date    Anxiety 2006    AVM (arteriovenous malformation) 2006    Balance disorder 2006    from AVM  uses walker    Breast cancer 08/2019    left    Edema     Fractures 2012    left foot    MVP (mitral valve prolapse) 1997    Reflux     Seroma of breast     recurrent seroma to left breast.     Wears glasses      Past Surgical History:   Procedure Laterality Date    BILATERAL MASTECTOMY  06/04/2020    BRAIN AVM REPAIR  2006    BREAST BIOPSY  left    2002    BREAST RECONSTRUCTION  06/04/2020    BREAST RECONSTRUCTION  06/04/2020    COLONOSCOPY N/A 01/13/2020    Procedure: COLONOSCOPY;  Surgeon: Librado Monroe MD;  Location: [facility];  Service: Endoscopy;  Laterality: N/A;    COLONOSCOPY N/A 12/29/2022    Procedure: COLONOSCOPY;  Surgeon: Librado Monroe MD;  Location: [facility];  Service: Endoscopy;  Laterality: N/A;    CRANIOTOMY  2006    CVA tumor removal  2006    ESOPHAGOGASTRODUODENOSCOPY N/A 01/13/2020    Procedure: EGD (ESOPHAGOGASTRODUODENOSCOPY);  Surgeon: Librado Monroe MD;  Location: [facility];   Service: Endoscopy;  Laterality: N/A;    FOOT FRACTURE SURGERY Left     INSERTION OF CATHETER Right 2019    Procedure: INSERTION, CATHETER;  Surgeon: Caitlyn Elkins MD;  Location: [facility] OR;  Service: General;  Laterality: Right;    removal port of cath      REVISION OF VASCULAR ACCESS PORT Right 2019    Procedure: REVISION, VASCULAR ACCESS PORT;  Surgeon: Caitlyn Elkins MD;  Location: [facility] OR;  Service: General;  Laterality: Right;  WOUND OPENED AND PORT FLIPPED.    VENTRICULOPERITONEAL SHUNT      MAGNETIC - NO MRI     Family History   Problem Relation Name Age of Onset    Diabetes Neg Hx       Social History     Tobacco Use    Smoking status: Former     Current packs/day: 0.00     Average packs/day: 0.3 packs/day for 4.0 years (1.0 ttl pk-yrs)     Types: Cigarettes     Start date: 2008     Quit date: 2012     Years since quittin.1    Smokeless tobacco: Never   Substance Use Topics    Alcohol use: Yes     Alcohol/week: 27.0 standard drinks of alcohol     Types: 21 Glasses of wine, 4 Cans of beer, 2 Shots of liquor per week    Drug use: No     Review of Systems   All other systems reviewed and are negative.      Physical Exam     Initial Vitals [24 0846]   BP Pulse Resp Temp SpO2   (!) 138/104 105 18 98.5 °F (36.9 °C) 99 %      MAP       --         Physical Exam    Nursing note and vitals reviewed.  Constitutional: She appears well-developed and well-nourished.   Pleasant, polite   HENT:   Head: Normocephalic and atraumatic.   Eyes: EOM are normal.   Neck: Neck supple.   Normal range of motion.  Cardiovascular:  Normal rate, regular rhythm, normal heart sounds and intact distal pulses.           Pulmonary/Chest: Breath sounds normal. No respiratory distress.   Abdominal: Abdomen is soft.   Musculoskeletal:      Cervical back: Normal range of motion and neck supple.     Neurological: She is alert and oriented to person, place, and time. She has normal strength.   Skin: Skin is  warm and dry. Capillary refill takes less than 2 seconds.   Psychiatric: She has a normal mood and affect. Her behavior is normal. Judgment and thought content normal.         ED Course   Procedures  Labs Reviewed   CBC W/ AUTO DIFFERENTIAL - Abnormal; Notable for the following components:       Result Value    MCH 31.8 (*)     MPV 8.6 (*)     All other components within normal limits   COMPREHENSIVE METABOLIC PANEL - Abnormal; Notable for the following components:    Anion Gap 6 (*)     All other components within normal limits   MAGNESIUM   TROPONIN I HIGH SENSITIVITY   B-TYPE NATRIURETIC PEPTIDE        ECG Results              EKG 12-lead (Final result)        Collection Time Result Time QRS Duration OHS QTC Calculation    06/12/24 08:56:25 06/12/24 20:00:18 74 459                     Final result by Interface, [facility] (06/12/24 20:00:25)                   Narrative:    Test Reason : R68.84,    Vent. Rate : 099 BPM     Atrial Rate : 099 BPM     P-R Int : 158 ms          QRS Dur : 074 ms      QT Int : 358 ms       P-R-T Axes : 064 056 066 degrees     QTc Int : 459 ms    Normal sinus rhythm  Normal ECG  When compared with ECG of 10-KAY-2024 21:41,  Minimal criteria for Anterior infarct are no longer Present  Confirmed by Candido TAN, Jessee LOZADA ([id number]) on 6/12/2024 8:00:17 PM    Referred By:             Confirmed By:Jessee Rey MD                                  Imaging Results              X-Ray Chest PA And Lateral (Final result)  Result time 06/12/24 09:35:53   Procedure changed from X-Ray Chest AP Portable     Final result by Mike Casanova DO (06/12/24 09:35:53)                   Impression:      No acute cardiopulmonary process.      Electronically signed by: Mike Casanova  Date:    06/12/2024  Time:    09:35               Narrative:    EXAMINATION:  XR CHEST PA AND LATERAL    CLINICAL HISTORY:  jaw pain;    FINDINGS:  PA and lateral chest with comparison chest x-ray 06/10/2024.  Cardiomediastinal  silhouette is within normal limits. Lungs are clear. Pulmonary vasculature is normal. No acute osseous abnormality.                                       Medications   hydrALAZINE tablet 50 mg (50 mg Oral Given 6/12/24 1126)     Medical Decision Making  Patient appears in no acute distress    Considerations include but are not limited to acute kidney injury, dehydration, electrolyte abnormalities, anxiety, reflux, GERD, ACS, TMJ, dental pain    MDM    Patient presents for emergent evaluation of acute jaw pain that poses a threat to life and/or bodily function.    In the ED patient found to have acute jaw pain.     Amount and/or complexity of data reviewed   I ordered labs and personally reviewed them.  Labs significant for negative troponin.  I ordered X-rays and personally reviewed them and reviewed the radiologist interpretation.  Xray significant for no acute cardiopulmonary process.    I ordered EKG and personally reviewed it.  EKG significant for regular rate rhythm without ST elevation.        I did review prior medical records.        Discharge MDM and Risk  Patient's presentation is atypical.  There is no evidence of the jaw pain is acute coronary syndrome with normal EKG and negative troponin.  Patient having no active or ongoing pain.  Patient was scheduled for stress testing on Friday.  Shared decision-making with the patient regarding diagnosis, treatment, and plan was well received.    Patient was discharged in stable condition.  Detailed return precautions discussed.    Amount and/or Complexity of Data Reviewed  Labs:  Decision-making details documented in ED Course.    Risk  Prescription drug management.               ED Course as of 06/14/24 1347   Wed Jun 12, 2024   1138 Troponin I High Sensitivity: 2.9 [AP]   1138 Sodium: 138 [AP]   1138 Potassium: 3.8 [AP]   1138 Chloride: 103 [AP]   1138 CO2: 29 [AP]   1138 Glucose: 103 [AP]   1138 BUN: 11 [AP]   1138 Creatinine: 0.7 [AP]   1138 Calcium: 9.1 [AP]    1138 PROTEIN TOTAL: 7.4 [AP]   1138 Albumin: 4.1 [AP]   1138 BILIRUBIN TOTAL: 0.7 [AP]   1138 ALP: 56 [AP]   1138 AST: 27 [AP]   1138 ALT: 27 [AP]   1138 eGFR: >60.0 [AP]      ED Course User Index  [AP] Nathan Sal MD                           Clinical Impression:  Final diagnoses:  [R68.84] Jaw pain          ED Disposition Condition    Discharge Stable          ED Prescriptions    None       Follow-up Information       Follow up With Specialties Details Why Contact Info    Sasha Fish, FNP Emergency Medicine, Family Medicine Schedule an appointment as soon as possible for a visit in 2 days  [facility] [facility] [place] [address]  [phone]               Nathan Sal MD  06/14/24 [id number]

## 2024-06-14 NOTE — NURSING NOTE
Lexiscan portion of Stress Test completed without complications. Patient verbalized understanding of pre-post test instructions. Discharged from stress lab per Cardiology

## 2024-06-17 ENCOUNTER — TELEPHONE (OUTPATIENT)
Dept: CARDIOLOGY | Facility: CLINIC | Age: 44
End: 2024-06-17
Payer: MEDICARE

## 2024-06-17 NOTE — TELEPHONE ENCOUNTER
----- Message from Armando Santos sent at 6/17/2024 11:16 AM CDT -----  Regarding: return call  Contact: patient  Type:  Patient Returning Call    Who Called:patient  Who Left Message for Patient:office staff  Does the patient know what this is regarding?:do patient need a upcoming appointment with blood work?  Would the patient rather a call back or a response via [facility]? Please call to advise  Best Call Back Number:[phone]  Additional Information:

## 2024-06-17 NOTE — TELEPHONE ENCOUNTER
Spoke to Ms. Hill she already have had the lab work. She just went to the ER so we scheduled a follow-up for July 3rd with Beverly Rascon. She had testing (stress) done

## 2024-06-18 ENCOUNTER — TELEPHONE (OUTPATIENT)
Dept: CARDIOLOGY | Facility: HOSPITAL | Age: 44
End: 2024-06-18

## 2024-07-17 ENCOUNTER — OFFICE VISIT (OUTPATIENT)
Facility: CLINIC | Age: 44
End: 2024-07-17
Payer: MEDICARE

## 2024-07-17 VITALS
HEART RATE: 112 BPM | WEIGHT: 234.63 LBS | BODY MASS INDEX: 40.06 KG/M2 | RESPIRATION RATE: 16 BRPM | SYSTOLIC BLOOD PRESSURE: 139 MMHG | HEIGHT: 64 IN | DIASTOLIC BLOOD PRESSURE: 87 MMHG | TEMPERATURE: 98 F

## 2024-07-17 DIAGNOSIS — Z17.1 MALIGNANT NEOPLASM OF OVERLAPPING SITES OF LEFT BREAST IN FEMALE, ESTROGEN RECEPTOR NEGATIVE: Primary | ICD-10-CM

## 2024-07-17 DIAGNOSIS — R22.2 MASS OF LEFT CHEST WALL: ICD-10-CM

## 2024-07-17 DIAGNOSIS — C50.812 MALIGNANT NEOPLASM OF OVERLAPPING SITES OF LEFT BREAST IN FEMALE, ESTROGEN RECEPTOR NEGATIVE: Primary | ICD-10-CM

## 2024-07-17 PROCEDURE — 99999 PR PBB SHADOW E&M-EST. PATIENT-LVL IV: CPT | Mod: PBBFAC,,, | Performed by: INTERNAL MEDICINE

## 2024-07-17 PROCEDURE — G2211 COMPLEX E/M VISIT ADD ON: HCPCS | Mod: S$PBB,,, | Performed by: INTERNAL MEDICINE

## 2024-07-17 PROCEDURE — 99215 OFFICE O/P EST HI 40 MIN: CPT | Mod: S$PBB,,, | Performed by: INTERNAL MEDICINE

## 2024-07-17 PROCEDURE — 99214 OFFICE O/P EST MOD 30 MIN: CPT | Mod: PBBFAC,PN | Performed by: INTERNAL MEDICINE

## 2024-07-18 NOTE — PROGRESS NOTES
"[facility] [address] Hematology Oncology In Office Subsequent Encounter Note    7/17/24    ELROY Holland MD Mark Nicaud, MD    C/O:  breast cancer follow up    Triple negative. Dx 9/2019.  Approaching 4 1/2 years out since Dx in 9/2023.  [facility]ard adjuvant chemo x's 6.  Surgery, [facility].  Reconstructed.  Needs further surgery to "clean up" breast.  Hgb 10.  Ferritin decreased, heavy menses. Check stool for heme.  Discussed po Fe pills vs IV Fe infusion weekly x's 2 weeks.  She agrees to Fe infusions.  She has tolerated Fe po poorly in the past.  It is iron infusions were given and energy is improved.  Hemoglobin is normal at this time and ferritin is normal at this time.    After Fe infusion, Hgb 13.6 and Ferritin now at 37.  Discussed and ordered Injectafer 750 mg HH x's 1 to replenish Fe stores.  RTC 6 months with lab HH.      She had bilateral mastectomy with flap reconstruction surgery on June 4th, 2020.   Dr. Castellon.  S/P chemo x's 6 cycles  Neoadjuvant Rx.   Echo ej fx NL.    She had a nightmare and got up quickly and tripped and fell and hit her breast on a stool.  She was black and blue.  She has a small abrasion at the 7 o'clock position of the right breast and at the left anterior axillary line there is a palpable movable lymph node on exam'.  She has reconstructed breast with extensive fat in the breast area.  She complains of pain and decreased range of motion at the left shoulder area.    Discussed with the radiologist, he recommends an x-ray of the shoulder and an ultrasound of the left chest wall to check on the palpable nodule and to see if this is something that could be biopsy able if needed.    She had placed a heating pad to the right breast after falling, and 2 or 3 days later she developed a blister there which popped, and then 1 or 2 weeks later the bridge abrasion like area recurred.  Have given her Keflex 500 mg 1 p.o. t.i.d. times 10 days to see if this will heal.  She " is allergic to penicillin but has taken Keflex before.    When she goes for the x-ray and ultrasound of the left chest wall, will order CBC, ferritin, and tumor markers.    She does have a [employer] nervous [employer] shunt placed in 2006.  Will discuss with radiologist to see if MRI of breast is feasible in the setting of the shunt.    EGD, colonoscopy 12/2022 NL.  Repeat in 2 years.  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    L breast mamm and u/s 2/26/20.  Mamm/U/S mass effect 2.8 cm to 1 cm, improved L breast.  R breast with fat necrosis.    Considered getting a MRI, but she has CNS shunt in place, S/P surgery for AVM.    She had L mastectomy and Sentinal node bx.  Also R prophylactic mastectomy with  Bilateral reconstruction.  Estimated 10% risk of Ca recurrence on chest wall.    PN sx have increased since completing chemo.  Joint pain sx increased. Cause?  Trial of lyrica 50mg 1 TID.    The ultrasound showed that the breast mass had resolved and was not seen on the ultrasound study.  This is a very good response to the 6 cycles of neoadjuvant chemotherapy.  Pre op u/s.    She told me CRISTIAN in path report.  Original pathology report from [facility] was triple negative.  [facility] showed no evidence of disease in the left or right breast or in the sentinel node biopsy.    She achieved complete remission status with the neoadjuvant chemotherapy x6 cycles.    On examination today examination today, she does not have palpable lymphadenopathy at the cervical, supraclavicular, or axillary areas at the left or right.    The left and right breast shows extensive postoperative change however the incisions are closed and healing, the areas are nontender without palpable mass, ecchymoses have resolved.  The abdominal area shows that the anterior incision is closed,  well-healed, nontender without ecchymoses.    She had  bilateral mastectomy with reconstruction.  I would estimate her risk of developing new primary breast cancer at 3% when followed over the next many years.  The original breast cancer was triple negative.  I have not recommended tamoxifen or Arimidex adjuvantly or preventively for her.     She had further surgery to get symmetrical appearance to the left and right breast .  The incision sites are closed and well healed and the breast are nontender  she does not have palpable mass at the left or right breast.    She has keloid formation with large scaring at L & R back areas.  Her lungs are clear bilaterally, breathing is unlabored  She has regular rhythm without murmur, chest wall is nontender.  Abdomen is nontender without distention and liver and spleen are not palpable  She does not have CVA tenderness  Calves are nontender without edema or petechiae  Neurologically she is grossly intact.  She does not have palpable lymphadenopathy on exam    She had a hemoglobin at 10.  Ferritin 14.  Injectafer weekly x's 2, [facility].  Completed.  Check stools for heme.    Now Hgb stable 12.8,  and Ferritin is down to 22.  Give injectafer x's 2 at .    Check CAT of chest and L axilla [facility] Img.    RTC 1=2 weeks.  CBC, CMP, ferritin, tumor markers 6 months.

## 2024-07-19 ENCOUNTER — HOSPITAL ENCOUNTER (OUTPATIENT)
Dept: RADIOLOGY | Facility: HOSPITAL | Age: 44
Discharge: HOME | End: 2024-07-19
Attending: INTERNAL MEDICINE
Payer: MEDICARE

## 2024-07-19 ENCOUNTER — OFFICE VISIT (OUTPATIENT)
Dept: CARDIOLOGY | Facility: CLINIC | Age: 44
End: 2024-07-19
Payer: MEDICARE

## 2024-07-19 VITALS
WEIGHT: 227 LBS | DIASTOLIC BLOOD PRESSURE: 86 MMHG | BODY MASS INDEX: 38.76 KG/M2 | SYSTOLIC BLOOD PRESSURE: 135 MMHG | HEIGHT: 64 IN | OXYGEN SATURATION: 98 % | HEART RATE: 102 BPM

## 2024-07-19 DIAGNOSIS — Z17.1 MALIGNANT NEOPLASM OF OVERLAPPING SITES OF LEFT BREAST IN FEMALE, ESTROGEN RECEPTOR NEGATIVE: ICD-10-CM

## 2024-07-19 DIAGNOSIS — C50.819 MALIGNANT NEOPLASM OF OVERLAPPING SITES OF BREAST IN FEMALE, ESTROGEN RECEPTOR NEGATIVE, UNSPECIFIED LATERALITY: ICD-10-CM

## 2024-07-19 DIAGNOSIS — Z17.1 MALIGNANT NEOPLASM OF OVERLAPPING SITES OF BREAST IN FEMALE, ESTROGEN RECEPTOR NEGATIVE, UNSPECIFIED LATERALITY: ICD-10-CM

## 2024-07-19 DIAGNOSIS — F41.1 GENERALIZED ANXIETY DISORDER: ICD-10-CM

## 2024-07-19 DIAGNOSIS — R00.2 PALPITATIONS: Primary | ICD-10-CM

## 2024-07-19 DIAGNOSIS — R07.89 ATYPICAL CHEST PAIN: ICD-10-CM

## 2024-07-19 DIAGNOSIS — C50.812 MALIGNANT NEOPLASM OF OVERLAPPING SITES OF LEFT BREAST IN FEMALE, ESTROGEN RECEPTOR NEGATIVE: ICD-10-CM

## 2024-07-19 DIAGNOSIS — R22.2 MASS OF LEFT CHEST WALL: ICD-10-CM

## 2024-07-19 DIAGNOSIS — E78.5 DYSLIPIDEMIA: ICD-10-CM

## 2024-07-19 PROCEDURE — 25500020 PHARM REV CODE 255: Performed by: INTERNAL MEDICINE

## 2024-07-19 PROCEDURE — 71260 CT THORAX DX C+: CPT | Mod: 26,,, | Performed by: RADIOLOGY

## 2024-07-19 PROCEDURE — 99999 PR PBB SHADOW E&M-EST. PATIENT-LVL III: CPT | Mod: PBBFAC,,,

## 2024-07-19 PROCEDURE — 71260 CT THORAX DX C+: CPT | Mod: TC

## 2024-07-19 PROCEDURE — 99214 OFFICE O/P EST MOD 30 MIN: CPT | Mod: S$PBB,,,

## 2024-07-19 PROCEDURE — 99213 OFFICE O/P EST LOW 20 MIN: CPT | Mod: PBBFAC,PN

## 2024-07-19 RX ORDER — DILTIAZEM HYDROCHLORIDE 180 MG/1
180 CAPSULE, EXTENDED RELEASE ORAL DAILY
Qty: 90 CAPSULE | Refills: 3 | Status: SHIPPED | OUTPATIENT
Start: 2024-07-19 | End: 2025-07-19

## 2024-07-19 RX ORDER — CALCIUM CARBONATE/VITAMIN D3 500-10/5ML
400 LIQUID (ML) ORAL DAILY
Qty: 90 CAPSULE | Refills: 3 | Status: SHIPPED | OUTPATIENT
Start: 2024-07-19

## 2024-07-19 RX ADMIN — IOHEXOL 100 ML: 350 INJECTION, SOLUTION INTRAVENOUS at 01:07

## 2024-07-19 NOTE — ASSESSMENT & PLAN NOTE
Patient not taking metoprolol.  She was being transitioned to diltiazem for rate control in the setting of Raynaud's phenomenon.  Heart rate 102 today in office.  Recommend increasing diltiazem to 180 mg daily.  Blood pressure stable 135/86 today in office.  Recommend restarting magnesium oxide.  She ran out. Refill sent. Reduce caffeine and stressors.  Monitor heart rate at home and keep blood pressure and heart rate log.

## 2024-07-19 NOTE — ASSESSMENT & PLAN NOTE
Lipid panel performed through PCP. Not available on Epic.  Reviewed on patient's phone, LDL stable. Cholesterol 156, triglycerides 94, hdl 60, ldl 77,  Low-sodium heart healthy diet.  Reduce saturated fats.  Recommend weight loss.  Also continue omega-3 fatty acids.

## 2024-07-19 NOTE — ASSESSMENT & PLAN NOTE
Patient recently presented to the emergency room with atypical pain in left jaw.  No acute STT wave changes were noted and troponins were negative.  Nuclear stress test was performed outpatient that was negative for reversible ischemia.  We will obtain echocardiogram.  Unlikely cardiac in nature.  Resolved at this time.  Denies anginal equivalent symptoms.

## 2024-07-19 NOTE — PROGRESS NOTES
Subjective:    Patient ID:  Sergio Clifford is a 43 y.o. female patient here for evaluation Hospital Follow Up (No issues stated by pt)      History of Present Illness:     Patient was here for a hospital follow up.  She was complaining of left-sided jaw pain and presented to the ED on 06/14/2024.  No associated chest pain or shortness of breath.  No acute STT wave changes on EKG and troponin HS was negative.  She was discharged with outpatient stress test later that week.  Nuclear stress test performed, negative for reversible ischemia.  Euvolemic today in office.  Blood pressure stable.  Heart rate elevated 102.  She was frequently in sinus tachycardia per review of previous EKGs.        Review of patient's allergies indicates:   Allergen Reactions    Adhesive tape-silicones Rash    Codeine Itching    Gabapentin Other (See Comments)     Vivid dreams    Meperidine Other (See Comments)     hallucinations  Other reaction(s): Other (See Comments)  Hallucinations    Adhesive Rash    Penicillins Hives and Rash    Ultram [tramadol] Nausea Only       Past Medical History:   Diagnosis Date    Anxiety 2006    AVM (arteriovenous malformation) 2006    Balance disorder 2006    from AVM  uses walker    Breast cancer 08/2019    left    Edema     Fractures 2012    left foot    MVP (mitral valve prolapse) 1997    Reflux     Seroma of breast     recurrent seroma to left breast.     Wears glasses      Past Surgical History:   Procedure Laterality Date    BILATERAL MASTECTOMY  06/04/2020    BRAIN AVM REPAIR  2006    BREAST BIOPSY  left    2002    BREAST RECONSTRUCTION  06/04/2020    BREAST RECONSTRUCTION  06/04/2020    COLONOSCOPY N/A 01/13/2020    Procedure: COLONOSCOPY;  Surgeon: Librado Monroe MD;  Location: [facility] ENDO;  Service: Endoscopy;  Laterality: N/A;    COLONOSCOPY N/A 12/29/2022    Procedure: COLONOSCOPY;  Surgeon: Librado Monroe MD;  Location: [facility] ENDO;  Service: Endoscopy;  Laterality: N/A;    CRANIOTOMY  2006     CVA tumor removal  2006    ESOPHAGOGASTRODUODENOSCOPY N/A 2020    Procedure: EGD (ESOPHAGOGASTRODUODENOSCOPY);  Surgeon: Librado Monroe MD;  Location: [facility] ENDO;  Service: Endoscopy;  Laterality: N/A;    FOOT FRACTURE SURGERY Left     INSERTION OF CATHETER Right 2019    Procedure: INSERTION, CATHETER;  Surgeon: Caitlyn Elkins MD;  Location: [facility] OR;  Service: General;  Laterality: Right;    removal port of cath      REVISION OF VASCULAR ACCESS PORT Right 2019    Procedure: REVISION, VASCULAR ACCESS PORT;  Surgeon: Caitlyn Elkins MD;  Location: [facility] OR;  Service: General;  Laterality: Right;  WOUND OPENED AND PORT FLIPPED.    VENTRICULOPERITONEAL SHUNT  2006    MAGNETIC - NO MRI     Social History     Tobacco Use    Smoking status: Former     Current packs/day: 0.00     Average packs/day: 0.3 packs/day for 4.0 years (1.0 ttl pk-yrs)     Types: Cigarettes     Start date: 2008     Quit date: 2012     Years since quittin.1    Smokeless tobacco: Never   Substance Use Topics    Alcohol use: Yes     Alcohol/week: 27.0 standard drinks of alcohol     Types: 21 Glasses of wine, 4 Cans of beer, 2 Shots of liquor per week    Drug use: No        Review of Systems:    As noted in HPI in addition      REVIEW OF SYSTEMS  CARDIOVASCULAR: No recent chest pain, palpitations, arm, neck, or jaw pain  RESPIRATORY: No recent fever, cough chills, SOB or congestion  : No blood in the urine  GI: No Nausea, vomiting, constipation, diarrhea, blood, or reflux.  MUSCULOSKELETAL: No myalgias  NEURO: No lightheadedness or dizziness  EYES: No Double vision, blurry, vision or headache              Objective        Vitals:    24 0817   BP: 135/86   Pulse: 102       LIPIDS - LAST 2   Lab Results   Component Value Date    CHOL 170 2023    HDL 66 2023    LDLCALC 81.6 2023    TRIG 112 2023    CHOLHDL 38.8 2023       CBC - LAST 2  Lab Results   Component Value Date    WBC 5.33  06/12/2024    WBC 8.51 06/10/2024    RBC 4.37 06/12/2024    RBC 4.39 06/10/2024    HGB 13.9 06/12/2024    HGB 14.3 06/10/2024    HCT 42.2 06/12/2024    HCT 41.7 06/10/2024    MCV 97 06/12/2024    MCV 95 06/10/2024    MCH 31.8 (H) 06/12/2024    MCH 32.6 (H) 06/10/2024    MCHC 32.9 06/12/2024    MCHC 34.3 06/10/2024    RDW 12.7 06/12/2024    RDW 12.5 06/10/2024     06/12/2024     06/10/2024    MPV 8.6 (L) 06/12/2024    MPV 8.3 (L) 06/10/2024    GRAN 3.4 06/12/2024    GRAN 63.9 06/12/2024    LYMPH 1.3 06/12/2024    LYMPH 24.4 06/12/2024    MONO 0.5 06/12/2024    MONO 9.2 06/12/2024    BASO 0.02 06/12/2024    BASO 0.03 06/10/2024    NRBC 0 06/12/2024    NRBC 0 06/10/2024       CHEMISTRY & LIVER FUNCTION - LAST 2  Lab Results   Component Value Date     06/12/2024     06/10/2024    K 3.8 06/12/2024    K 3.7 06/10/2024     06/12/2024    CL 99 06/10/2024    CO2 29 06/12/2024    CO2 29 06/10/2024    ANIONGAP 6 (L) 06/12/2024    ANIONGAP 9 06/10/2024    BUN 11 06/12/2024    BUN 14 06/10/2024    CREATININE 0.7 06/12/2024    CREATININE 0.7 06/10/2024     06/12/2024    GLU 99 06/10/2024    CALCIUM 9.1 06/12/2024    CALCIUM 9.7 06/10/2024    MG 2.1 06/12/2024    MG 2.0 06/10/2024    ALBUMIN 4.1 06/12/2024    ALBUMIN 4.3 06/10/2024    PROT 7.4 06/12/2024    PROT 7.6 06/10/2024    ALKPHOS 56 06/12/2024    ALKPHOS 61 06/10/2024    ALT 27 06/12/2024    ALT 25 06/10/2024    AST 27 06/12/2024    AST 26 06/10/2024    BILITOT 0.7 06/12/2024    BILITOT 0.5 06/10/2024        CARDIAC PROFILE - LAST 2  Lab Results   Component Value Date    BNP 97 06/12/2024     (H) 06/10/2024    TROPONINIHS 2.9 06/12/2024    TROPONINIHS 3.6 06/11/2024        COAGULATION - LAST 2  Lab Results   Component Value Date    LABPT 12.5 11/05/2021    INR 0.9 12/27/2022    INR 1.0 11/05/2021    APTT 24.2 12/27/2022    APTT 25.5 11/05/2021       ENDOCRINE & PSA - LAST 2  Lab Results   Component Value Date    TSH 3.573  06/10/2024        ECHOCARDIOGRAM RESULTS  Results for orders placed in visit on 12/30/19    Echo Color Flow Doppler? Yes; Bubble Contrast? No    Interpretation Summary  · The left ventricular global longitudinal strain is --18%.< normal>  · Increased (hyperdynamic) left ventricular systolic function. The estimated ejection fraction is 75%  · Normal LV diastolic function.  · No wall motion abnormalities.  · Normal right ventricular systolic function.  · Normal central venous pressure (3 mm Hg).      CURRENT/PREVIOUS VISIT EKG  Results for orders placed or performed during the hospital encounter of 06/12/24   EKG 12-lead    Collection Time: 06/12/24  8:56 AM   Result Value Ref Range    QRS Duration 74 ms    OHS QTC Calculation 459 ms    Narrative    Test Reason : R68.84,    Vent. Rate : 099 BPM     Atrial Rate : 099 BPM     P-R Int : 158 ms          QRS Dur : 074 ms      QT Int : 358 ms       P-R-T Axes : 064 056 066 degrees     QTc Int : 459 ms    Normal sinus rhythm  Normal ECG  When compared with ECG of 10-KAY-2024 21:41,  Minimal criteria for Anterior infarct are no longer Present  Confirmed by Candido TAN, Jessee LOZADA ([id number]) on 6/12/2024 8:00:17 PM    Referred By:             Confirmed By:Jessee Rey MD     No valid procedures specified.   Results for orders placed in visit on 06/14/24    Nuclear Stress Test    Interpretation Summary    The ECG portion of the study is negative for ischemia.    The patient reported no chest pain during the stress test.    There were no arrhythmias during stress.    The nuclear portion of this study will be reported separately.    No valid procedures specified.    PHYSICAL EXAM  CONSTITUTIONAL: Well built, well nourished in no apparent distress  NECK: no carotid bruit, no JVD  LUNGS: CTA  CHEST WALL: no tenderness  HEART: regular rate and rhythm, S1, S2 normal, no murmur, click, rub or gallop   ABDOMEN: soft, non-tender; bowel sounds normal  EXTREMITIES: Extremities normal, no  edema  NEURO: AAO X 3    I HAVE REVIEWED :    The vital signs, nurses notes, and all the pertinent radiology and labs.        Current Outpatient Medications   Medication Instructions    ascorbic acid (vitamin C) (VITAMIN C) 1,000 mg, Oral, Daily    buPROPion (WELLBUTRIN SR) 100 mg, Oral, Daily    diltiaZEM (DILACOR XR) 180 mg, Oral, Daily    DULoxetine (CYMBALTA) 60 mg, Oral, Daily, Pt takes 30 mg    magnesium oxide 400 mg, Oral, Daily    omega-3 fatty acids/fish oil (FISH OIL-OMEGA-3 FATTY ACIDS) 300-1,000 mg capsule 1 capsule, Oral, Daily    pantoprazole (PROTONIX) 40 mg, Oral, Daily    rosuvastatin (CRESTOR) 5 mg, Oral, Daily    vitamin D (VITAMIN D3) 1,000 Units, Oral, Daily          Assessment & Plan     Palpitations  Patient not taking metoprolol.  She was being transitioned to diltiazem for rate control in the setting of Raynaud's phenomenon.  Heart rate 102 today in office.  Recommend increasing diltiazem to 180 mg daily.  Blood pressure stable 135/86 today in office.  Recommend restarting magnesium oxide.  She ran out. Refill sent. Reduce caffeine and stressors.  Monitor heart rate at home and keep blood pressure and heart rate log.    Dyslipidemia  Lipid panel performed through PCP. Not available on Epic.  Reviewed on patient's phone, LDL stable. Cholesterol 156, triglycerides 94, hdl 60, ldl 77,  Low-sodium heart healthy diet.  Reduce saturated fats.  Recommend weight loss.  Also continue omega-3 fatty acids.    Generalized anxiety disorder  Stable.  On Wellbutrin.  Managed by PCP.    Malignant neoplasm of overlapping sites of breast in female, estrogen receptor negative  Stable, 4 years now. Managed by oncologist.  CBC stable.    Atypical chest pain  Patient recently presented to the emergency room with atypical pain in left jaw.  No acute STT wave changes were noted and troponins were negative.  Nuclear stress test was performed outpatient that was negative for reversible ischemia.  We will obtain  echocardiogram.  Unlikely cardiac in nature.  Resolved at this time.  Denies anginal equivalent symptoms.          Follow up in about 3 months (around 10/19/2024).

## 2024-07-25 ENCOUNTER — TELEPHONE (OUTPATIENT)
Dept: CARDIOLOGY | Facility: CLINIC | Age: 44
End: 2024-07-25
Payer: MEDICARE

## 2024-07-25 DIAGNOSIS — R00.2 PALPITATIONS: Primary | ICD-10-CM

## 2024-07-25 NOTE — TELEPHONE ENCOUNTER
----- Message from Angela Barth sent at 7/25/2024  2:23 PM CDT -----  Contact: self  Pt thought she had her echo today but it was anita for yesterday and she is downstairs wondering if there is anything today available.  She can wait until it is time to go to her next appt at 4 PM.  Please call back to advise at [phone] and thanks

## 2024-07-30 ENCOUNTER — TELEPHONE (OUTPATIENT)
Dept: CARDIOLOGY | Facility: CLINIC | Age: 44
End: 2024-07-30
Payer: MEDICARE

## 2024-07-30 NOTE — TELEPHONE ENCOUNTER
----- Message from Mary Stark sent at 7/30/2024 11:02 AM CDT -----  Contact: self  Type:  Patient Returning Call    Who Called:self  Who Left Message for Patient:n/a  Does the patient know what this is regarding?:yes, echo appt  Would the patient rather a call back or a response via [facility]? call  Best Call Back Number:[phone] (home)     Additional Information: please advise and thank you.

## 2024-08-06 ENCOUNTER — HOSPITAL ENCOUNTER (OUTPATIENT)
Dept: CARDIOLOGY | Facility: HOSPITAL | Age: 44
Discharge: HOME | End: 2024-08-06
Payer: MEDICARE

## 2024-08-06 VITALS — BODY MASS INDEX: 38.76 KG/M2 | HEIGHT: 64 IN | WEIGHT: 227 LBS

## 2024-08-06 DIAGNOSIS — R00.2 PALPITATIONS: ICD-10-CM

## 2024-08-06 LAB
AORTIC ROOT ANNULUS: 2.6 CM
AORTIC VALVE CUSP SEPERATION: 1.6 CM
AV INDEX (PROSTH): 0.8
AV MEAN GRADIENT: 6 MMHG
AV PEAK GRADIENT: 11 MMHG
AV VALVE AREA BY VELOCITY RATIO: 1.99 CM²
AV VALVE AREA: 2.51 CM²
AV VELOCITY RATIO: 0.63
BSA FOR ECHO PROCEDURE: 2.16 M2
CV ECHO LV RWT: 0.47 CM
DOP CALC AO PEAK VEL: 1.64 M/S
DOP CALC AO VTI: 27.7 CM
DOP CALC LVOT AREA: 3.1 CM2
DOP CALC LVOT DIAMETER: 2 CM
DOP CALC LVOT PEAK VEL: 1.04 M/S
DOP CALC LVOT STROKE VOLUME: 69.39 CM3
DOP CALCLVOT PEAK VEL VTI: 22.1 CM
E WAVE DECELERATION TIME: 174 MSEC
E/A RATIO: 0.9
E/E' RATIO: 8.6 M/S
ECHO LV POSTERIOR WALL: 1.07 CM (ref 0.6–1.1)
FRACTIONAL SHORTENING: 35 % (ref 28–44)
INTERVENTRICULAR SEPTUM: 1.01 CM (ref 0.6–1.1)
IVRT: 106 MSEC
LEFT ATRIUM SIZE: 3 CM
LEFT INTERNAL DIMENSION IN SYSTOLE: 2.95 CM (ref 2.1–4)
LEFT VENTRICLE DIASTOLIC VOLUME INDEX: 45.1 ML/M2
LEFT VENTRICLE DIASTOLIC VOLUME: 92.9 ML
LEFT VENTRICLE MASS INDEX: 79 G/M2
LEFT VENTRICLE SYSTOLIC VOLUME INDEX: 16.3 ML/M2
LEFT VENTRICLE SYSTOLIC VOLUME: 33.6 ML
LEFT VENTRICULAR INTERNAL DIMENSION IN DIASTOLE: 4.51 CM (ref 3.5–6)
LEFT VENTRICULAR MASS: 162.39 G
LV LATERAL E/E' RATIO: 9.56 M/S
LV SEPTAL E/E' RATIO: 7.82 M/S
LVED V (TEICH): 92.9 ML
LVES V (TEICH): 33.6 ML
LVOT MG: 2 MMHG
LVOT MV: 0.69 CM/S
MV PEAK A VEL: 0.96 M/S
MV PEAK E VEL: 0.86 M/S
MV STENOSIS PRESSURE HALF TIME: 59 MS
MV VALVE AREA P 1/2 METHOD: 3.73 CM2
OHS CV RV/LV RATIO: 0.28 CM
RA PRESSURE ESTIMATED: 3 MMHG
RIGHT VENTRICULAR END-DIASTOLIC DIMENSION: 1.28 CM
TDI LATERAL: 0.09 M/S
TDI SEPTAL: 0.11 M/S
TDI: 0.1 M/S
Z-SCORE OF LEFT VENTRICULAR DIMENSION IN END DIASTOLE: -3.22
Z-SCORE OF LEFT VENTRICULAR DIMENSION IN END SYSTOLE: -2.02

## 2024-08-06 PROCEDURE — 93306 TTE W/DOPPLER COMPLETE: CPT

## 2024-08-06 PROCEDURE — 93306 TTE W/DOPPLER COMPLETE: CPT | Mod: 26,,, | Performed by: INTERNAL MEDICINE

## 2024-08-15 ENCOUNTER — TELEPHONE (OUTPATIENT)
Dept: CARDIOLOGY | Facility: CLINIC | Age: 44
End: 2024-08-15
Payer: MEDICARE

## 2024-08-15 NOTE — TELEPHONE ENCOUNTER
----- Message from Beverly Rascon NP sent at 8/7/2024  4:58 PM CDT -----  Stable echocardiogram.  Normal systolic and diastolic function.  No acute valvular abnormalities.    Beverly Rascon NP

## 2024-08-23 ENCOUNTER — PATIENT MESSAGE (OUTPATIENT)
Facility: CLINIC | Age: 44
End: 2024-08-23
Payer: MEDICARE

## 2024-08-23 RX ORDER — SODIUM CHLORIDE 9 MG/ML
INJECTION, SOLUTION INTRAVENOUS CONTINUOUS
OUTPATIENT
Start: 2024-08-23

## 2024-08-23 RX ORDER — SODIUM CHLORIDE 0.9 % (FLUSH) 0.9 %
10 SYRINGE (ML) INJECTION
OUTPATIENT
Start: 2024-08-23

## 2024-08-23 RX ORDER — EPINEPHRINE 0.3 MG/.3ML
0.3 INJECTION SUBCUTANEOUS ONCE AS NEEDED
OUTPATIENT
Start: 2024-08-23

## 2024-08-23 RX ORDER — HEPARIN 100 UNIT/ML
5 SYRINGE INTRAVENOUS
OUTPATIENT
Start: 2024-08-23

## 2024-08-23 RX ORDER — DIPHENHYDRAMINE HYDROCHLORIDE 50 MG/ML
50 INJECTION INTRAMUSCULAR; INTRAVENOUS ONCE AS NEEDED
OUTPATIENT
Start: 2024-08-23

## 2025-01-29 ENCOUNTER — TELEPHONE (OUTPATIENT)
Facility: CLINIC | Age: 45
End: 2025-01-29
Payer: MEDICARE

## 2025-01-29 RX ORDER — SODIUM CHLORIDE 0.9 % (FLUSH) 0.9 %
10 SYRINGE (ML) INJECTION
OUTPATIENT
Start: 2025-01-29

## 2025-01-29 RX ORDER — HEPARIN 100 UNIT/ML
500 SYRINGE INTRAVENOUS
OUTPATIENT
Start: 2025-01-29

## 2025-01-29 RX ORDER — DIPHENHYDRAMINE HYDROCHLORIDE 50 MG/ML
50 INJECTION INTRAMUSCULAR; INTRAVENOUS ONCE AS NEEDED
OUTPATIENT
Start: 2025-01-29

## 2025-01-29 RX ORDER — EPINEPHRINE 0.3 MG/.3ML
0.3 INJECTION SUBCUTANEOUS ONCE AS NEEDED
OUTPATIENT
Start: 2025-01-29

## 2025-01-29 NOTE — TELEPHONE ENCOUNTER
Spoke to Patient. Explained we were waiting on auth from her insurance. Patient verbally demonstrated understanding.

## 2025-02-03 RX ORDER — PANTOPRAZOLE SODIUM 20 MG/1
40 TABLET, DELAYED RELEASE ORAL
Qty: 180 TABLET | Refills: 3 | Status: SHIPPED | OUTPATIENT
Start: 2025-02-03

## 2025-02-03 RX ORDER — ROSUVASTATIN CALCIUM 5 MG/1
5 TABLET, COATED ORAL
Qty: 90 TABLET | Refills: 3 | Status: SHIPPED | OUTPATIENT
Start: 2025-02-03

## 2025-02-25 ENCOUNTER — TELEPHONE (OUTPATIENT)
Facility: CLINIC | Age: 45
End: 2025-02-25
Payer: MEDICARE

## 2025-02-25 ENCOUNTER — PATIENT MESSAGE (OUTPATIENT)
Facility: CLINIC | Age: 45
End: 2025-02-25
Payer: MEDICARE

## 2025-02-25 DIAGNOSIS — D50.0 IRON DEFICIENCY ANEMIA DUE TO CHRONIC BLOOD LOSS: ICD-10-CM

## 2025-02-25 DIAGNOSIS — Z17.1 MALIGNANT NEOPLASM OF OVERLAPPING SITES OF LEFT BREAST IN FEMALE, ESTROGEN RECEPTOR NEGATIVE: Primary | ICD-10-CM

## 2025-02-25 DIAGNOSIS — C50.812 MALIGNANT NEOPLASM OF OVERLAPPING SITES OF LEFT BREAST IN FEMALE, ESTROGEN RECEPTOR NEGATIVE: Primary | ICD-10-CM

## 2025-02-25 NOTE — TELEPHONE ENCOUNTER
Spoke to Patient.  I explained that her insurance had approved Injectafer.  I added that someone from  would call and schedule her appts. Patient verbally demonstrated understanding og POC.     I faxed orders and Auth to  and sent standing labs (CBC, CMP, Ferritin) to  lab.

## 2025-03-03 ENCOUNTER — TELEPHONE (OUTPATIENT)
Facility: CLINIC | Age: 45
End: 2025-03-03
Payer: MEDICARE

## 2025-03-03 NOTE — TELEPHONE ENCOUNTER
LVM to confirm appointment scheduled for 3/11 at 9:40 with Dr. Serna and remind pt to complete labs prior to visit.

## 2025-03-11 ENCOUNTER — TELEPHONE (OUTPATIENT)
Facility: CLINIC | Age: 45
End: 2025-03-11
Payer: MEDICARE

## 2025-03-11 ENCOUNTER — OFFICE VISIT (OUTPATIENT)
Facility: CLINIC | Age: 45
End: 2025-03-11
Payer: MEDICARE

## 2025-03-11 ENCOUNTER — PATIENT MESSAGE (OUTPATIENT)
Facility: CLINIC | Age: 45
End: 2025-03-11

## 2025-03-11 ENCOUNTER — LAB VISIT (OUTPATIENT)
Dept: LAB | Facility: HOSPITAL | Age: 45
End: 2025-03-11
Attending: INTERNAL MEDICINE
Payer: MEDICARE

## 2025-03-11 VITALS
SYSTOLIC BLOOD PRESSURE: 129 MMHG | HEART RATE: 96 BPM | OXYGEN SATURATION: 97 % | BODY MASS INDEX: 41.15 KG/M2 | HEIGHT: 64 IN | RESPIRATION RATE: 16 BRPM | WEIGHT: 241 LBS | TEMPERATURE: 98 F | DIASTOLIC BLOOD PRESSURE: 84 MMHG

## 2025-03-11 DIAGNOSIS — C50.811 MALIGNANT NEOPLASM OF OVERLAPPING SITES OF BOTH BREASTS IN FEMALE, ESTROGEN RECEPTOR NEGATIVE: ICD-10-CM

## 2025-03-11 DIAGNOSIS — Z17.1 MALIGNANT NEOPLASM OF OVERLAPPING SITES OF BOTH BREASTS IN FEMALE, ESTROGEN RECEPTOR NEGATIVE: ICD-10-CM

## 2025-03-11 DIAGNOSIS — Z17.1 MALIGNANT NEOPLASM OF OVERLAPPING SITES OF BOTH BREASTS IN FEMALE, ESTROGEN RECEPTOR NEGATIVE: Primary | ICD-10-CM

## 2025-03-11 DIAGNOSIS — C50.812 MALIGNANT NEOPLASM OF OVERLAPPING SITES OF BOTH BREASTS IN FEMALE, ESTROGEN RECEPTOR NEGATIVE: ICD-10-CM

## 2025-03-11 DIAGNOSIS — C50.811 MALIGNANT NEOPLASM OF OVERLAPPING SITES OF BOTH BREASTS IN FEMALE, ESTROGEN RECEPTOR NEGATIVE: Primary | ICD-10-CM

## 2025-03-11 DIAGNOSIS — C50.812 MALIGNANT NEOPLASM OF OVERLAPPING SITES OF BOTH BREASTS IN FEMALE, ESTROGEN RECEPTOR NEGATIVE: Primary | ICD-10-CM

## 2025-03-11 DIAGNOSIS — D50.0 IRON DEFICIENCY ANEMIA DUE TO CHRONIC BLOOD LOSS: ICD-10-CM

## 2025-03-11 PROCEDURE — 36415 COLL VENOUS BLD VENIPUNCTURE: CPT | Performed by: INTERNAL MEDICINE

## 2025-03-11 PROCEDURE — 86300 IMMUNOASSAY TUMOR CA 15-3: CPT | Performed by: INTERNAL MEDICINE

## 2025-03-11 PROCEDURE — 99213 OFFICE O/P EST LOW 20 MIN: CPT | Mod: PBBFAC,PN | Performed by: INTERNAL MEDICINE

## 2025-03-11 PROCEDURE — 86300 IMMUNOASSAY TUMOR CA 15-3: CPT | Mod: 91 | Performed by: INTERNAL MEDICINE

## 2025-03-11 PROCEDURE — 99999 PR PBB SHADOW E&M-EST. PATIENT-LVL III: CPT | Mod: PBBFAC,,, | Performed by: INTERNAL MEDICINE

## 2025-03-11 NOTE — PROGRESS NOTES
"[facility] [address] Hematology Oncology In Office Subsequent Encounter Note    3/11/25    ELROY Holland MD Mark Nicaud, MD    C/O:  breast cancer follow up    Triple negative. Dx 9/2019.  Approaching 4 1/2 years out since Dx in 9/2023.  Willard adjuvant chemo x's 6.  Surgery, [facility]STIAN.  Reconstructed.  Needs further surgery to "clean up" breast.  Hgb 10.  Ferritin decreased, heavy menses. Check stool for heme.  Discussed po Fe pills vs IV Fe infusion weekly x's 2 weeks.  She agrees to Fe infusions.  She has tolerated Fe po poorly in the past.  It is iron infusions were given and energy is improved.  Hemoglobin is normal at this time and ferritin is normal at this time.    After Fe infusion, Hgb 13.6 and Ferritin now at 37.  Discussed and ordered Injectafer 750 mg HH x's 1 to replenish Fe stores.  RTC 6 months with lab HH.      She had bilateral mastectomy with flap reconstruction surgery on June 4th, 2020.   Dr. Castellon.  S/P chemo x's 6 cycles  Neoadjuvant Rx.   Echo ej fx NL.    She had a nightmare and got up quickly and tripped and fell and hit her breast on a stool.  She was black and blue.  She has a small abrasion at the 7 o'clock position of the right breast and at the left anterior axillary line there is a palpable movable lymph node on exam'.  She has reconstructed breast with extensive fat in the breast area.  She complains of pain and decreased range of motion at the left shoulder area.    Discussed with the radiologist, he recommends an x-ray of the shoulder and an ultrasound of the left chest wall to check on the palpable nodule and to see if this is something that could be biopsy able if needed.    She had placed a heating pad to the right breast after falling, and 2 or 3 days later she developed a blister there which popped, and then 1 or 2 weeks later the bridge abrasion like area recurred.  Have given her Keflex 500 mg 1 p.o. t.i.d. times 10 days to see if this will heal.  She " is allergic to penicillin but has taken Keflex before.    When she goes for the x-ray and ultrasound of the left chest wall, will order CBC, ferritin, and tumor markers.    She does have a [employer] nervous Systems shunt placed in 2006.  Will discuss with radiologist to see if MRI of breast is feasible in the setting of the shunt.    EGD, colonoscopy 12/2022 NL.  Repeat in 2 years.  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    L breast mamm and u/s 2/26/20.  Mamm/U/S mass effect 2.8 cm to 1 cm, improved L breast.  R breast with fat necrosis.    Considered getting a MRI, but she has CNS shunt in place, S/P surgery for AVM.    She had L mastectomy and Sentinal node bx.  Also R prophylactic mastectomy with  Bilateral reconstruction.  Estimated 10% risk of Ca recurrence on chest wall.    PN sx have increased since completing chemo.  Joint pain sx increased. Cause?  Trial of lyrica 50mg 1 TID.    The ultrasound showed that the breast mass had resolved and was not seen on the ultrasound study.  This is a very good response to the 6 cycles of neoadjuvant chemotherapy.  Pre op u/s.    She told me CRISTIAN in path report.  Original pathology report from [facility] was triple negative.  [facility] showed no evidence of disease in the left or right breast or in the sentinel node biopsy.    She achieved complete remission status with the neoadjuvant chemotherapy x6 cycles.    On examination today examination today, she does not have palpable lymphadenopathy at the cervical, supraclavicular, or axillary areas at the left or right.    The left and right breast shows extensive postoperative change however the incisions are closed and healing, the areas are nontender without palpable mass, ecchymoses have resolved.  The abdominal area shows that the anterior incision is closed,  well-healed, nontender without ecchymoses.    She had  bilateral mastectomy with reconstruction.  I would estimate her risk of developing new primary breast cancer at 3% when followed over the next many years.  The original breast cancer was triple negative.  I have not recommended tamoxifen or Arimidex adjuvantly or preventively for her.     She had further surgery to get symmetrical appearance to the left and right breast .  The incision sites are closed and well healed and the breast are nontender  she does not have palpable mass at the left or right breast.    She has keloid formation with large scaring at L & R back areas.  Her lungs are clear bilaterally, breathing is unlabored  She has regular rhythm without murmur, chest wall is nontender.  Abdomen is nontender without distention and liver and spleen are not palpable  She does not have CVA tenderness  Calves are nontender without edema or petechiae  Neurologically she is grossly intact.  She does not have palpable lymphadenopathy on exam    She had a hemoglobin at 10.  Ferritin 14.  Injectafer weekly x's 2, [facility].  Completed.  Check stools for heme.     Hgb stable 12.8,  and Ferritin is down to 22.  Gave injectafer x's 2 at .  Hgb now 12.9, Ferritin 20.  On injectafer x's 2.  Dr. Monroe did colonoscopy, call for report.    CAT of chest and L axilla [facility] Img. Renal stone, fatty liver, negative for Ca.    She has joint pain sx, multiple joints, family hx of auto immune Dx.  Refer to Dr. Conteh for evaluation.  Discussed and ordered ctDNA studies for breast cancer monitoring.  RTC 6 weeks.    Pee Rivera MD  Heme Onc  3/11/25

## 2025-03-12 LAB
CANCER AG15-3 SERPL-ACNC: 15.7 U/ML (ref 0–25)
CANCER AG27-29 SERPL-ACNC: 18.7 U/ML (ref 0–38.6)

## 2025-04-17 ENCOUNTER — TELEPHONE (OUTPATIENT)
Facility: CLINIC | Age: 45
End: 2025-04-17
Payer: MEDICARE

## 2025-04-17 NOTE — TELEPHONE ENCOUNTER
Bhavna St Nanda, NP-C reviewed patient's CTDNA results and per their verbal order, attempted to notify patient that results were Negative. Patient verbalized understanding of above.

## 2025-04-24 ENCOUNTER — OFFICE VISIT (OUTPATIENT)
Facility: CLINIC | Age: 45
End: 2025-04-24
Payer: MEDICARE

## 2025-04-24 VITALS
WEIGHT: 245 LBS | SYSTOLIC BLOOD PRESSURE: 127 MMHG | OXYGEN SATURATION: 97 % | RESPIRATION RATE: 16 BRPM | BODY MASS INDEX: 41.83 KG/M2 | HEIGHT: 64 IN | DIASTOLIC BLOOD PRESSURE: 87 MMHG | HEART RATE: 98 BPM | TEMPERATURE: 98 F

## 2025-04-24 DIAGNOSIS — Z80.0 FAMILY HISTORY OF MALIGNANT NEOPLASM OF DIGESTIVE ORGANS: ICD-10-CM

## 2025-04-24 DIAGNOSIS — Z17.1 MALIGNANT NEOPLASM OF OVERLAPPING SITES OF LEFT BREAST IN FEMALE, ESTROGEN RECEPTOR NEGATIVE: Primary | ICD-10-CM

## 2025-04-24 DIAGNOSIS — C50.812 MALIGNANT NEOPLASM OF OVERLAPPING SITES OF LEFT BREAST IN FEMALE, ESTROGEN RECEPTOR NEGATIVE: Primary | ICD-10-CM

## 2025-04-24 DIAGNOSIS — Z15.09 LYNCH SYNDROME: ICD-10-CM

## 2025-04-24 DIAGNOSIS — Z86.2 HX OF IRON DEFICIENCY ANEMIA: ICD-10-CM

## 2025-04-24 PROCEDURE — 99999 PR PBB SHADOW E&M-EST. PATIENT-LVL IV: CPT | Mod: PBBFAC,,, | Performed by: INTERNAL MEDICINE

## 2025-04-24 PROCEDURE — 99214 OFFICE O/P EST MOD 30 MIN: CPT | Mod: PBBFAC,PN | Performed by: INTERNAL MEDICINE

## 2025-04-24 NOTE — PROGRESS NOTES
"[facility] [address] Hematology Oncology In Office Subsequent Encounter Note    4/24/25    ELROY Holland MD Mark Nicaud, MD    C/O:  breast cancer follow up    Triple negative. Dx 9/2019.  Approaching 4 1/2 years out since Dx in 9/2023.  Willard adjuvant chemo x's 6.  Surgery, [facility].  Reconstructed.  Needs further surgery to "clean up" breast.  Hgb 10.  Ferritin decreased, heavy menses. Check stool for heme.  Discussed po Fe pills vs IV Fe infusion weekly x's 2 weeks.  She agrees to Fe infusions.  She has tolerated Fe po poorly in the past.  It is iron infusions were given and energy is improved.  Hemoglobin is normal at this time and ferritin is normal at this time.    After Fe infusion, Hgb 13.6 and Ferritin now at 37.  Discussed and ordered Injectafer 750 mg HH x's 1 to replenish Fe stores.  RTC 6 months with lab HH.      She had bilateral mastectomy with flap reconstruction surgery on June 4th, 2020.   Dr. Castellon.  S/P chemo x's 6 cycles  Neoadjuvant Rx.   Echo ej fx NL.    She had a nightmare and got up quickly and tripped and fell and hit her breast on a stool.  She was black and blue.  She has a small abrasion at the 7 o'clock position of the right breast and at the left anterior axillary line there is a palpable movable lymph node on exam'.  She has reconstructed breast with extensive fat in the breast area.  She complains of pain and decreased range of motion at the left shoulder area.    Discussed with the radiologist, he recommends an x-ray of the shoulder and an ultrasound of the left chest wall to check on the palpable nodule and to see if this is something that could be biopsy able if needed.    She had placed a heating pad to the right breast after falling, and 2 or 3 days later she developed a blister there which popped, and then 1 or 2 weeks later the bridge abrasion like area recurred.  Have given her Keflex 500 mg 1 p.o. t.i.d. times 10 days to see if this will heal.  She " is allergic to penicillin but has taken Keflex before.    When she goes for the x-ray and ultrasound of the left chest wall, will order CBC, ferritin, and tumor markers.    She does have a [employer] nervous Systems shunt placed in 2006.  Will discuss with radiologist to see if MRI of breast is feasible in the setting of the shunt.    EGD, colonoscopy 12/2022 NL.  Repeat in 2 years.  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    L breast mamm and u/s 2/26/20.  Mamm/U/S mass effect 2.8 cm to 1 cm, improved L breast.  R breast with fat necrosis.    Considered getting a MRI, but she has CNS shunt in place, S/P surgery for AVM.    She had L mastectomy and Sentinal node bx.  Also R prophylactic mastectomy with  Bilateral reconstruction.  Estimated 10% risk of Ca recurrence on chest wall.    PN sx have increased since completing chemo.  Joint pain sx increased. Cause?  Trial of lyrica 50mg 1 TID.    The ultrasound showed that the breast mass had resolved and was not seen on the ultrasound study.  This is a very good response to the 6 cycles of neoadjuvant chemotherapy.  Pre op u/s.    She told me CRISTIAN in path report.  Original pathology report from [facility] was triple negative.  [facility] showed no evidence of disease in the left or right breast or in the sentinel node biopsy.    She achieved complete remission status with the neoadjuvant chemotherapy x6 cycles.    On examination today examination today, she does not have palpable lymphadenopathy at the cervical, supraclavicular, or axillary areas at the left or right.    The left and right breast shows extensive postoperative change however the incisions are closed and healing, the areas are nontender without palpable mass, ecchymoses have resolved.  The abdominal area shows that the anterior incision is closed,  well-healed, nontender without ecchymoses.    She had  bilateral mastectomy with reconstruction.  I would estimate her risk of developing new primary breast cancer at 3% when followed over the next many years.  The original breast cancer was triple negative.  I have not recommended tamoxifen or Arimidex adjuvantly or preventively for her.     She had further surgery to get symmetrical appearance to the left and right breast .  The incision sites are closed and well healed and the breast are nontender  she does not have palpable mass at the left or right breast.    She has keloid formation with large scaring at L & R back areas.  Her lungs are clear bilaterally, breathing is unlabored  She has regular rhythm without murmur, chest wall is nontender.  Abdomen is nontender without distention and liver and spleen are not palpable  She does not have CVA tenderness  Calves are nontender without edema or petechiae  Neurologically she is grossly intact.  She does not have palpable lymphadenopathy on exam    She had a hemoglobin at 10.  Ferritin 14.  Injectafer weekly x's 2, [facility].  Completed.  Check stools for heme.     Hgb stable 12.8,  and Ferritin is down to 22.  Gave injectafer x's 2 at .  Hgb now 12.9, Ferritin 20.  On injectafer x's 2.  Dr. Monroe did colonoscopy, call for report.    CAT of chest and L axilla [facility] Img. Renal stone, fatty liver, negative for Ca.    She has joint pain sx, multiple joints, family hx of auto immune Dx.  Refer to Dr. Conteh for evaluation.  Discussed and ordered ctDNA studies for breast cancer monitoring.      Status post Injectafer infusion x2.  Hemoglobin is up to 13.4, and ferritin has gone from 32 up to 273.  Observation is the plan at this time.    CT DNA results returned and have been discussed with her today April 24, 2025.  CT DNA results were negative.  Follow-up CT DNA studies will be done in October 2025 and I will see her in November 2025.    The rest cancer was at the left breast, status post left mastectomy and  sentinel node biopsy, status post prophylactic right mastectomy.    She was negative for BRCA 1 and BRCA 2 but was found to have genetic changes positive for Reeder syndrome.  She her last colonoscopy was December 2022, she is due for a follow-up colonoscopy in December 2024.  Iron deficiency anemia is improved with a normal hemoglobin and normal ferritin after Injectafer administration.    Pee Rivera MD  Heme Onc  4/24/25

## 2025-04-27 PROBLEM — E66.01 MORBID (SEVERE) OBESITY DUE TO EXCESS CALORIES: Status: ACTIVE | Noted: 2025-04-27

## 2025-04-28 ENCOUNTER — TELEPHONE (OUTPATIENT)
Facility: CLINIC | Age: 45
End: 2025-04-28
Payer: MEDICARE

## 2025-04-28 NOTE — TELEPHONE ENCOUNTER
----- Message from СВЕТЛАНА Serna MD sent at 4/27/2025  4:56 PM CDT -----  Sergio Clifford [id number] of breast cancer. Fe deficiency anemia.Would be due for next ctDNA, ferritin cbc cmp Ca 27.29, Ca 15.3 in 10/2025.RTC see me 11/2025.

## 2025-06-24 ENCOUNTER — TELEPHONE (OUTPATIENT)
Facility: CLINIC | Age: 45
End: 2025-06-24
Payer: MEDICARE

## 2025-06-24 NOTE — TELEPHONE ENCOUNTER
Patient called to remind that their CTDNA is due. No answer at number listed. Voicemail left with instructions to call back.

## 2025-06-25 NOTE — TELEPHONE ENCOUNTER
Patient returned call and is agreeable to get ctDNA testing completed - patient to stop by Monday, 6/30 around 10 am to get blood drawn. She verbalized understanding of above.

## 2025-06-25 NOTE — TELEPHONE ENCOUNTER
Notified that patient returned call - attempted to call patient back, no answer. Voicemail left with instructions to call back.

## 2025-06-26 ENCOUNTER — PATIENT MESSAGE (OUTPATIENT)
Dept: CARDIOLOGY | Facility: CLINIC | Age: 45
End: 2025-06-26
Payer: MEDICARE

## 2025-06-27 ENCOUNTER — TELEPHONE (OUTPATIENT)
Facility: CLINIC | Age: 45
End: 2025-06-27
Payer: MEDICARE

## 2025-06-27 NOTE — TELEPHONE ENCOUNTER
At patient's request, called to remind patient of ctDNA draw planned for 6/30/2025. No answer at number listed. Voicemail left with above and instructions to call with any questions.

## 2025-06-27 NOTE — TELEPHONE ENCOUNTER
Lvm informing her that the message was seen and it is suggested to keep the visit with Dr. Deluna  for review she also stated that she had made a visit with her PCP in the mean while

## 2025-06-30 ENCOUNTER — LAB VISIT (OUTPATIENT)
Dept: LAB | Facility: HOSPITAL | Age: 45
End: 2025-06-30
Attending: INTERNAL MEDICINE
Payer: MEDICARE

## 2025-06-30 ENCOUNTER — PATIENT MESSAGE (OUTPATIENT)
Dept: CARDIOLOGY | Facility: CLINIC | Age: 45
End: 2025-06-30
Payer: MEDICARE

## 2025-06-30 DIAGNOSIS — M79.89 NODULE OF SOFT TISSUE: ICD-10-CM

## 2025-06-30 DIAGNOSIS — D50.9 IRON DEFICIENCY ANEMIA, UNSPECIFIED IRON DEFICIENCY ANEMIA TYPE: ICD-10-CM

## 2025-06-30 DIAGNOSIS — C50.812 MALIGNANT NEOPLASM OF OVERLAPPING SITES OF LEFT BREAST IN FEMALE, ESTROGEN RECEPTOR NEGATIVE: ICD-10-CM

## 2025-06-30 DIAGNOSIS — Z17.1 MALIGNANT NEOPLASM OF OVERLAPPING SITES OF LEFT BREAST IN FEMALE, ESTROGEN RECEPTOR NEGATIVE: ICD-10-CM

## 2025-06-30 LAB
ABSOLUTE EOSINOPHIL (SMH): 0.09 K/UL
ABSOLUTE MONOCYTE (SMH): 0.59 K/UL (ref 0.3–1)
ABSOLUTE NEUTROPHIL COUNT (SMH): 3.4 K/UL (ref 1.8–7.7)
ALBUMIN SERPL-MCNC: 4.2 G/DL (ref 3.5–5.2)
ALP SERPL-CCNC: 49 UNIT/L (ref 55–135)
ALT SERPL-CCNC: 13 UNIT/L (ref 10–44)
ANION GAP (SMH): 7 MMOL/L (ref 8–16)
AST SERPL-CCNC: 15 UNIT/L (ref 10–40)
BASOPHILS # BLD AUTO: 0.04 K/UL
BASOPHILS NFR BLD AUTO: 0.7 %
BILIRUB SERPL-MCNC: 0.6 MG/DL (ref 0.1–1)
BUN SERPL-MCNC: 14 MG/DL (ref 6–20)
CALCIUM SERPL-MCNC: 9.2 MG/DL (ref 8.7–10.5)
CHLORIDE SERPL-SCNC: 105 MMOL/L (ref 95–110)
CO2 SERPL-SCNC: 27 MMOL/L (ref 23–29)
CREAT SERPL-MCNC: 0.8 MG/DL (ref 0.5–1.4)
ERYTHROCYTE [DISTWIDTH] IN BLOOD BY AUTOMATED COUNT: 12.9 % (ref 11.5–14.5)
FERRITIN SERPL-MCNC: 72.4 NG/ML (ref 20–300)
GFR SERPLBLD CREATININE-BSD FMLA CKD-EPI: >60 ML/MIN/1.73/M2
GLUCOSE SERPL-MCNC: 100 MG/DL (ref 70–110)
HCT VFR BLD AUTO: 40.1 % (ref 37–48.5)
HGB BLD-MCNC: 13.5 GM/DL (ref 12–16)
IMM GRANULOCYTES # BLD AUTO: 0.01 K/UL (ref 0–0.04)
IMM GRANULOCYTES NFR BLD AUTO: 0.2 % (ref 0–0.5)
LYMPHOCYTES # BLD AUTO: 1.83 K/UL (ref 1–4.8)
MCH RBC QN AUTO: 32.5 PG (ref 27–31)
MCHC RBC AUTO-ENTMCNC: 33.7 G/DL (ref 32–36)
MCV RBC AUTO: 97 FL (ref 82–98)
NUCLEATED RBC (/100WBC) (SMH): 0 /100 WBC
PLATELET # BLD AUTO: 301 K/UL (ref 150–450)
PMV BLD AUTO: 8.4 FL (ref 9.2–12.9)
POTASSIUM SERPL-SCNC: 3.9 MMOL/L (ref 3.5–5.1)
PROT SERPL-MCNC: 7.1 GM/DL (ref 6–8.4)
RBC # BLD AUTO: 4.15 M/UL (ref 4–5.4)
RELATIVE EOSINOPHIL (SMH): 1.5 % (ref 0–8)
RELATIVE LYMPHOCYTE (SMH): 30.8 % (ref 18–48)
RELATIVE MONOCYTE (SMH): 9.9 % (ref 4–15)
RELATIVE NEUTROPHIL (SMH): 56.9 % (ref 38–73)
SODIUM SERPL-SCNC: 139 MMOL/L (ref 136–145)
WBC # BLD AUTO: 5.94 K/UL (ref 3.9–12.7)

## 2025-06-30 PROCEDURE — 86300 IMMUNOASSAY TUMOR CA 15-3: CPT

## 2025-06-30 PROCEDURE — 86300 IMMUNOASSAY TUMOR CA 15-3: CPT | Mod: 59

## 2025-06-30 PROCEDURE — 85025 COMPLETE CBC W/AUTO DIFF WBC: CPT

## 2025-06-30 PROCEDURE — 36415 COLL VENOUS BLD VENIPUNCTURE: CPT

## 2025-06-30 PROCEDURE — 82728 ASSAY OF FERRITIN: CPT

## 2025-06-30 PROCEDURE — 82247 BILIRUBIN TOTAL: CPT

## 2025-07-02 LAB
CANCER AG15-3 SERPL-ACNC: 16.2 U/ML (ref 0–25)
CANCER AG27-29 SERPL-ACNC: 18.7 U/ML (ref 0–38.6)

## 2025-07-14 ENCOUNTER — OFFICE VISIT (OUTPATIENT)
Dept: CARDIOLOGY | Facility: CLINIC | Age: 45
End: 2025-07-14
Payer: MEDICARE

## 2025-07-14 VITALS
BODY MASS INDEX: 40.17 KG/M2 | HEART RATE: 95 BPM | OXYGEN SATURATION: 95 % | WEIGHT: 234 LBS | SYSTOLIC BLOOD PRESSURE: 140 MMHG | DIASTOLIC BLOOD PRESSURE: 94 MMHG

## 2025-07-14 DIAGNOSIS — E78.5 DYSLIPIDEMIA: ICD-10-CM

## 2025-07-14 DIAGNOSIS — F41.1 GENERALIZED ANXIETY DISORDER: ICD-10-CM

## 2025-07-14 DIAGNOSIS — R00.2 PALPITATIONS: Primary | ICD-10-CM

## 2025-07-14 DIAGNOSIS — E66.01 MORBID (SEVERE) OBESITY DUE TO EXCESS CALORIES: ICD-10-CM

## 2025-07-14 DIAGNOSIS — I10 ESSENTIAL HYPERTENSION: ICD-10-CM

## 2025-07-14 PROCEDURE — 99999 PR PBB SHADOW E&M-EST. PATIENT-LVL III: CPT | Mod: PBBFAC,,, | Performed by: INTERNAL MEDICINE

## 2025-07-14 PROCEDURE — 99213 OFFICE O/P EST LOW 20 MIN: CPT | Mod: PBBFAC,PN | Performed by: INTERNAL MEDICINE

## 2025-07-14 PROCEDURE — 99214 OFFICE O/P EST MOD 30 MIN: CPT | Mod: S$PBB,,, | Performed by: INTERNAL MEDICINE

## 2025-07-14 RX ORDER — DILTIAZEM HYDROCHLORIDE 240 MG/1
240 CAPSULE, EXTENDED RELEASE ORAL DAILY
Qty: 90 CAPSULE | Refills: 3 | Status: SHIPPED | OUTPATIENT
Start: 2025-07-14 | End: 2026-07-14

## 2025-07-14 NOTE — ASSESSMENT & PLAN NOTE
Besides going up on diltiazem to 240 mg a day strongly recommend to maintain on low-salt diet increased physical activity monitor blood pressure at home.

## 2025-07-14 NOTE — ASSESSMENT & PLAN NOTE
She is trying some diet control and recommend to be more aggressive diet management and daily physical activity and discuss with primary care regarding GLP 1

## 2025-07-14 NOTE — PROGRESS NOTES
Subjective:    Patient ID:  Sergio Clifford is a 44 y.o. female patient here for evaluation Follow-up, Chest Pain, and Palpitations      History of Present Illness:   This has 44-year-old with history of arterial hypertension had developed sudden onset of discomfort in the left pectoral area this has also associated with increased heart rate and lasts about 20 minutes duration gradually subsided.  This has unprovoked and no nausea noted in his and no significant shortness of breath with this has episode.  She has picked up some weight over the holidays.  Otherwise denies having any new symptoms shortness of breath no swelling in the lower extremities and no dizziness lightheadedness noted.  She does have some atypical symptoms in his chest wall from the breast surgery and scar tissue associated with it.        Review of patient's allergies indicates:   Allergen Reactions    Adhesive tape-silicones Rash    Codeine Itching    Gabapentin Other (See Comments)     Vivid dreams    Meperidine Other (See Comments)     hallucinations  Other reaction(s): Other (See Comments)  Hallucinations    Adhesive Rash    Penicillins Hives and Rash    Ultram [tramadol] Nausea Only       Past Medical History:   Diagnosis Date    Anxiety 2006    AVM (arteriovenous malformation) 2006    Balance disorder 2006    from AVM  uses walker    Breast cancer 08/2019    left    Edema     Fractures 2012    left foot    MVP (mitral valve prolapse) 1997    Reflux     Seroma of breast     recurrent seroma to left breast.     Wears glasses      Past Surgical History:   Procedure Laterality Date    BILATERAL MASTECTOMY  06/04/2020    BRAIN AVM REPAIR  2006    BREAST BIOPSY  left    2002    BREAST RECONSTRUCTION  06/04/2020    BREAST RECONSTRUCTION  06/04/2020    COLONOSCOPY N/A 01/13/2020    Procedure: COLONOSCOPY;  Surgeon: Librado Monroe MD;  Location: [facility];  Service: Endoscopy;  Laterality: N/A;    COLONOSCOPY N/A 12/29/2022    Procedure:  COLONOSCOPY;  Surgeon: Librado Monroe MD;  Location: [facility] ENDO;  Service: Endoscopy;  Laterality: N/A;    CRANIOTOMY  2006    CVA tumor removal  2006    ESOPHAGOGASTRODUODENOSCOPY N/A 01/13/2020    Procedure: EGD (ESOPHAGOGASTRODUODENOSCOPY);  Surgeon: Librado Monroe MD;  Location: [facility] ENDO;  Service: Endoscopy;  Laterality: N/A;    FOOT FRACTURE SURGERY Left     INSERTION OF CATHETER Right 09/30/2019    Procedure: INSERTION, CATHETER;  Surgeon: Caitlyn Elkins MD;  Location: [facility] OR;  Service: General;  Laterality: Right;    removal port of cath  2020    REVISION OF VASCULAR ACCESS PORT Right 12/13/2019    Procedure: REVISION, VASCULAR ACCESS PORT;  Surgeon: Caitlyn Elkins MD;  Location: [facility] OR;  Service: General;  Laterality: Right;  WOUND OPENED AND PORT FLIPPED.    VENTRICULOPERITONEAL SHUNT  2006    MAGNETIC - NO MRI     Social History[1]     Review of Systems:    As noted in HPI in addition      REVIEW OF SYSTEMS  CARDIOVASCULAR: No recent chest pain, palpitations, arm, neck, or jaw pain  RESPIRATORY: No recent fever, cough chills, SOB or congestion  : No blood in the urine  GI: No Nausea, vomiting, constipation, diarrhea, blood, or reflux.  MUSCULOSKELETAL: No myalgias  NEURO: No lightheadedness or dizziness  EYES: No Double vision, blurry, vision or headache              Objective        Vitals:    07/14/25 0902   BP: (!) 140/94   Pulse: 95       LIPIDS - LAST 2   Lab Results   Component Value Date    CHOL 170 01/16/2023    HDL 66 01/16/2023    LDLCALC 81.6 01/16/2023    TRIG 112 01/16/2023    CHOLHDL 38.8 01/16/2023       CBC - LAST 2  Lab Results   Component Value Date    WBC 5.94 06/30/2025    WBC 5.33 06/12/2024    RBC 4.15 06/30/2025    RBC 4.37 06/12/2024    HGB 13.5 06/30/2025    HGB 13.9 06/12/2024    HCT 40.1 06/30/2025    HCT 42.2 06/12/2024    MCV 97 06/30/2025    MCV 97 06/12/2024    MCH 32.5 (H) 06/30/2025    MCH 31.8 (H) 06/12/2024    MCHC 33.7 06/30/2025    MCHC 32.9 06/12/2024    RDW 12.9  06/30/2025    RDW 12.7 06/12/2024     06/30/2025     06/12/2024    MPV 8.4 (L) 06/30/2025    MPV 8.6 (L) 06/12/2024    GRAN 3.4 06/12/2024    GRAN 63.9 06/12/2024    LYMPH 1.3 06/12/2024    LYMPH 24.4 06/12/2024    MONO 0.5 06/12/2024    MONO 9.2 06/12/2024    BASO 0.02 06/12/2024    BASO 0.03 06/10/2024    NRBC 0 06/30/2025    NRBC 0 06/12/2024       CHEMISTRY & LIVER FUNCTION - LAST 2  Lab Results   Component Value Date     06/30/2025     06/12/2024    K 3.9 06/30/2025    K 3.8 06/12/2024     06/30/2025     06/12/2024    CO2 27 06/30/2025    CO2 29 06/12/2024    ANIONGAP 7 (L) 06/30/2025    ANIONGAP 6 (L) 06/12/2024    BUN 14 06/30/2025    BUN 11 06/12/2024    CREATININE 0.8 06/30/2025    CREATININE 0.7 06/12/2024     06/30/2025     06/12/2024    CALCIUM 9.2 06/30/2025    CALCIUM 9.1 06/12/2024    MG 2.1 06/12/2024    MG 2.0 06/10/2024    ALBUMIN 4.2 06/30/2025    ALBUMIN 4.1 06/12/2024    PROT 7.1 06/30/2025    PROT 7.4 06/12/2024    ALKPHOS 49 (L) 06/30/2025    ALKPHOS 56 06/12/2024    ALT 13 06/30/2025    ALT 27 06/12/2024    AST 15 06/30/2025    AST 27 06/12/2024    BILITOT 0.6 06/30/2025    BILITOT 0.7 06/12/2024        CARDIAC PROFILE - LAST 2  Lab Results   Component Value Date    BNP 97 06/12/2024     (H) 06/10/2024    TROPONINIHS 2.9 06/12/2024    TROPONINIHS 3.6 06/11/2024        COAGULATION - LAST 2  Lab Results   Component Value Date    LABPT 12.5 11/05/2021    INR 0.9 12/27/2022    INR 1.0 11/05/2021    APTT 24.2 12/27/2022    APTT 25.5 11/05/2021       ENDOCRINE & PSA - LAST 2  Lab Results   Component Value Date    TSH 3.573 06/10/2024        ECHOCARDIOGRAM RESULTS  Results for orders placed during the hospital encounter of 08/06/24    Echo    Interpretation Summary    Left Ventricle: The left ventricle is normal in size. Normal wall thickness. There is normal systolic function with a visually estimated ejection fraction of 60 - 65%. There is  normal diastolic function.    Right Ventricle: Normal right ventricular cavity size. Wall thickness is normal. Systolic function is normal.    IVC/SVC: Normal venous pressure at 3 mmHg.      CURRENT/PREVIOUS VISIT EKG  Results for orders placed or performed during the hospital encounter of 06/12/24   EKG 12-lead    Collection Time: 06/12/24  8:56 AM   Result Value Ref Range    QRS Duration 74 ms    [facility] QTC Calculation 459 ms    Narrative    Test Reason : R68.84,    Vent. Rate : 099 BPM     Atrial Rate : 099 BPM     P-R Int : 158 ms          QRS Dur : 074 ms      QT Int : 358 ms       P-R-T Axes : 064 056 066 degrees     QTc Int : 459 ms    Normal sinus rhythm  Normal ECG  When compared with ECG of 10-KAY-2024 21:41,  Minimal criteria for Anterior infarct are no longer Present  Confirmed by Candido TAN, Jessee LOZADA ([id number]) on 6/12/2024 8:00:17 PM    Referred By:             Confirmed By:Jessee Rey MD     No valid procedures specified.   Results for orders placed in visit on 06/14/24    Nuclear Stress Test    Interpretation Summary    The ECG portion of the study is negative for ischemia.    The patient reported no chest pain during the stress test.    There were no arrhythmias during stress.    The nuclear portion of this study will be reported separately.    No valid procedures specified.    PHYSICAL EXAM  CONSTITUTIONAL: Well built, well nourished in no apparent distress  NECK: no carotid bruit, no JVD  LUNGS: CTA  CHEST WALL: no tenderness  HEART: regular rate and rhythm, S1, S2 normal, no murmur, click, rub or gallop   ABDOMEN: soft, non-tender; bowel sounds normal; no masses,  no organomegaly  EXTREMITIES: Extremities normal, no edema, no calf tenderness noted  NEURO: AAO X 3    I HAVE REVIEWED :    The vital signs, nurses notes, and all the pertinent radiology and labs.  07/14/2025 EKG shows normal sinus rhythm within normal limits      Current Outpatient Medications   Medication Instructions    ascorbic acid  (vitamin C) (VITAMIN C) 1,000 mg, Daily    buPROPion (WELLBUTRIN SR) 100 mg, Daily    diltiaZEM (DILACOR XR) 180 mg, Oral, Daily    DULoxetine (CYMBALTA) 60 mg, Daily    magnesium oxide 400 mg, Oral, Daily    omega-3 fatty acids/fish oil (FISH OIL-OMEGA-3 FATTY ACIDS) 300-1,000 mg capsule 1 capsule, Daily    pantoprazole (PROTONIX) 40 mg, Oral    rosuvastatin (CRESTOR) 5 mg, Oral    vitamin D (VITAMIN D3) 1,000 Units, Daily          Assessment & Plan     1. Palpitations  Assessment & Plan:  Palpitations occurring on provoked.  Continue on magnesium supplements   2. Increase the Dilacor up to 240 mg daily.  Continue on diet supplements to keep potassium level adequately controlled.  And foods that are rich in potassium      2. Dyslipidemia  Assessment & Plan:  Lipid levels are stable continue on Crestor 5 mg nightly LDL cholesterol is down to 81      3. Morbid (severe) obesity due to excess calories  Assessment & Plan:  She is trying some diet control and recommend to be more aggressive diet management and daily physical activity and discuss with primary care regarding GLP 1      4. Generalized anxiety disorder  Assessment & Plan:  Controlled with Wellbutrin maintain the same along with Cymbalta      5. Essential hypertension  Assessment & Plan:  Besides going up on diltiazem to 240 mg a day strongly recommend to maintain on low-salt diet increased physical activity monitor blood pressure at home.            Follow up in about 6 months (around 2026).            [1]   Social History  Tobacco Use    Smoking status: Former     Current packs/day: 0.00     Average packs/day: 0.3 packs/day for 4.0 years (1.0 ttl pk-yrs)     Types: Cigarettes     Start date: 2008     Quit date: 2012     Years since quittin.1    Smokeless tobacco: Never   Substance Use Topics    Alcohol use: Yes     Alcohol/week: 27.0 standard drinks of alcohol     Types: 21 Glasses of wine, 4 Cans of beer, 2 Shots of liquor per week     Drug use: No

## 2025-07-14 NOTE — ASSESSMENT & PLAN NOTE
Palpitations occurring on provoked.  Continue on magnesium supplements   2. Increase the Dilacor up to 240 mg daily.  Continue on diet supplements to keep potassium level adequately controlled.  And foods that are rich in potassium

## 2025-08-26 ENCOUNTER — OFFICE VISIT (OUTPATIENT)
Dept: URGENT CARE | Facility: CLINIC | Age: 45
End: 2025-08-26
Payer: MEDICARE

## 2025-08-26 VITALS
BODY MASS INDEX: 38.41 KG/M2 | HEIGHT: 64 IN | OXYGEN SATURATION: 98 % | DIASTOLIC BLOOD PRESSURE: 89 MMHG | SYSTOLIC BLOOD PRESSURE: 144 MMHG | TEMPERATURE: 98 F | WEIGHT: 225 LBS | RESPIRATION RATE: 19 BRPM | HEART RATE: 105 BPM

## 2025-08-26 DIAGNOSIS — R30.0 DYSURIA: ICD-10-CM

## 2025-08-26 DIAGNOSIS — N30.01 ACUTE CYSTITIS WITH HEMATURIA: Primary | ICD-10-CM

## 2025-08-26 LAB
BILIRUB UR QL STRIP: NEGATIVE
GLUCOSE UR QL STRIP: NEGATIVE
KETONES UR QL STRIP: NEGATIVE
LEUKOCYTE ESTERASE UR QL STRIP: POSITIVE
PH, POC UA: 6
POC BLOOD, URINE: POSITIVE
POC NITRATES, URINE: NEGATIVE
PROT UR QL STRIP: POSITIVE
SP GR UR STRIP: 1.02 (ref 1–1.03)
UROBILINOGEN UR STRIP-ACNC: 0.2 (ref 0.1–1.1)

## 2025-08-26 PROCEDURE — 81003 URINALYSIS AUTO W/O SCOPE: CPT | Mod: QW,S$GLB,,

## 2025-08-26 PROCEDURE — 99214 OFFICE O/P EST MOD 30 MIN: CPT | Mod: S$GLB,,,

## 2025-08-26 RX ORDER — LANOLIN ALCOHOL/MO/W.PET/CERES
1 CREAM (GRAM) TOPICAL
Qty: 90 TABLET | Refills: 1 | Status: SHIPPED | OUTPATIENT
Start: 2025-08-26

## 2025-08-26 RX ORDER — NITROFURANTOIN 25; 75 MG/1; MG/1
100 CAPSULE ORAL 2 TIMES DAILY
Qty: 10 CAPSULE | Refills: 0 | Status: SHIPPED | OUTPATIENT
Start: 2025-08-26 | End: 2025-08-31

## 2025-08-27 ENCOUNTER — TELEPHONE (OUTPATIENT)
Facility: CLINIC | Age: 45
End: 2025-08-27
Payer: MEDICARE

## 2025-08-29 ENCOUNTER — TELEPHONE (OUTPATIENT)
Facility: CLINIC | Age: 45
End: 2025-08-29
Payer: MEDICARE

## 2025-09-03 ENCOUNTER — TELEPHONE (OUTPATIENT)
Facility: CLINIC | Age: 45
End: 2025-09-03
Payer: MEDICARE

## (undated) DEVICE — SOLUTION IRRI NS BOTTLE 1000ML R5200-01

## (undated) DEVICE — BLADE SCALPEL #11 371111

## (undated) DEVICE — DRAPE IOBAN 23X17 6650EZ

## (undated) DEVICE — DRESSING TEGADERM 4X4 3/4 TD1004

## (undated) DEVICE — TRAY MINOR SLIDELL MEMORIAL HOSPITAL

## (undated) DEVICE — SOLUTION DURAPREP 8630

## (undated) DEVICE — Device

## (undated) DEVICE — DRESSING TEGADERM 2 1/3 X 2 3/4 TD1002

## (undated) DEVICE — DRAPE THYROID 89255

## (undated) DEVICE — GLOVE BIOGEL PI  GOLD SZ 7

## (undated) DEVICE — SYRINGE EAR 3OZ EAR303

## (undated) DEVICE — BAG DECANTER 10-102

## (undated) DEVICE — SUTURE SILK 3-0 SH 18 C013D

## (undated) DEVICE — SYRINGE 10ML 302995

## (undated) DEVICE — GOWN SMART LRG 044673

## (undated) DEVICE — TIP BOVIE ENT 2.75      E1455

## (undated) DEVICE — COVER LIGHT HANDLE LB53

## (undated) DEVICE — STAPLER SKIN ROTATING HEAD WIDE PRW35

## (undated) DEVICE — TRAY GENERAL SURGERY

## (undated) DEVICE — DRAPE C-ARM (FITS NEW C-ARM) 07-CA108

## (undated) DEVICE — ADHESIVE TISSUE EXOFIN

## (undated) DEVICE — SYRINGE HYPODERMC LL 22G 1.5 ECLIPSE 30

## (undated) DEVICE — GLOVE SURGICAL SIZE 7 STRAW POLYISOPRENE POWDER FREE

## (undated) DEVICE — SUTURE VICRYL 4-0 SH 27 J415H

## (undated) DEVICE — PAD BOVIE ADULT

## (undated) DEVICE — TOWEL OR BLUE      MDT2168284

## (undated) DEVICE — SPONGE GAUZE 10S 4X4  442214

## (undated) DEVICE — GLOVE BIOGEL PI GOLD SZ 6.5

## (undated) DEVICE — PENCIL BOVIE E2515H

## (undated) DEVICE — PACK BASIC V 88151

## (undated) DEVICE — STAPLER SKIN NON ROTATE PXW35

## (undated) DEVICE — TUBE CULTURE AMIES 220117

## (undated) DEVICE — NEEDLE SAFETY ECLIPSE 25G 1-1/2IN 305767

## (undated) DEVICE — GLOVE BIOGEL MICRO SURGEON PINK SZ 7

## (undated) DEVICE — SPONGE GAUZE 2X2 STERILE 2/PK 441204